# Patient Record
Sex: FEMALE | Race: WHITE | Employment: OTHER | URBAN - METROPOLITAN AREA
[De-identification: names, ages, dates, MRNs, and addresses within clinical notes are randomized per-mention and may not be internally consistent; named-entity substitution may affect disease eponyms.]

---

## 2017-03-14 ENCOUNTER — HOSPITAL ENCOUNTER (OUTPATIENT)
Age: 72
Discharge: HOME | End: 2017-03-14
Payer: MEDICARE

## 2017-03-14 LAB
ANION GAP SERPL CALCULATED.3IONS-SCNC: 13 MMOL/L (ref 9–17)
BUN BLDV-MCNC: 25 MG/DL (ref 8–23)
CALCIUM IONIZED: 1.27 MMOL/L (ref 1.13–1.33)
CHLORIDE BLD-SCNC: 105 MMOL/L (ref 98–107)
CO2: 27 MMOL/L (ref 20–31)
CREAT SERPL-MCNC: 1.04 MG/DL (ref 0.5–0.9)
GFR AFRICAN AMERICAN: >60 ML/MIN
GFR NON-AFRICAN AMERICAN: 53 ML/MIN
GFR SERPL CREATININE-BSD FRML MDRD: ABNORMAL ML/MIN/{1.73_M2}
GFR SERPL CREATININE-BSD FRML MDRD: ABNORMAL ML/MIN/{1.73_M2}
MAGNESIUM: 1.9 MG/DL (ref 1.6–2.6)
POTASSIUM SERPL-SCNC: 4.4 MMOL/L (ref 3.7–5.3)
PTH INTACT: 84.19 PG/ML (ref 15–65)
SODIUM BLD-SCNC: 145 MMOL/L (ref 135–144)
TSH SERPL DL<=0.05 MIU/L-ACNC: 2.02 MIU/L (ref 0.3–5)

## 2017-03-14 PROCEDURE — 80051 ELECTROLYTE PANEL: CPT

## 2017-03-14 PROCEDURE — 84443 ASSAY THYROID STIM HORMONE: CPT

## 2017-03-14 PROCEDURE — 82330 ASSAY OF CALCIUM: CPT

## 2017-03-14 PROCEDURE — 84520 ASSAY OF UREA NITROGEN: CPT

## 2017-03-14 PROCEDURE — 36415 COLL VENOUS BLD VENIPUNCTURE: CPT

## 2017-03-14 PROCEDURE — 82565 ASSAY OF CREATININE: CPT

## 2017-03-14 PROCEDURE — 83735 ASSAY OF MAGNESIUM: CPT

## 2017-03-14 PROCEDURE — 83970 ASSAY OF PARATHORMONE: CPT

## 2017-03-21 ENCOUNTER — HOSPITAL ENCOUNTER (OUTPATIENT)
Age: 72
Discharge: HOME | End: 2017-03-21
Payer: MEDICARE

## 2017-03-21 LAB
CALCIUM IONIZED: 1.27 MMOL/L (ref 1.13–1.33)
PHOSPHORUS: 3.3 MG/DL (ref 2.6–4.5)
PTH INTACT: 75.76 PG/ML (ref 15–65)

## 2017-03-21 PROCEDURE — 83970 ASSAY OF PARATHORMONE: CPT

## 2017-03-21 PROCEDURE — 84100 ASSAY OF PHOSPHORUS: CPT

## 2017-03-21 PROCEDURE — 36415 COLL VENOUS BLD VENIPUNCTURE: CPT

## 2017-03-21 PROCEDURE — 82330 ASSAY OF CALCIUM: CPT

## 2017-04-05 ENCOUNTER — HOSPITAL ENCOUNTER (OUTPATIENT)
Dept: CT IMAGING | Age: 72
Discharge: HOME | End: 2017-04-05
Payer: MEDICARE

## 2017-04-05 ENCOUNTER — HOSPITAL ENCOUNTER (OUTPATIENT)
Dept: NUCLEAR MEDICINE | Age: 72
Discharge: HOME | End: 2017-04-05
Payer: MEDICARE

## 2017-04-05 VITALS — WEIGHT: 280 LBS | HEIGHT: 69 IN | BODY MASS INDEX: 41.47 KG/M2

## 2017-04-05 DIAGNOSIS — R01.1 HEART MURMUR: ICD-10-CM

## 2017-04-05 DIAGNOSIS — E34.9 INCREASED PTH LEVEL: ICD-10-CM

## 2017-04-05 PROCEDURE — 70491 CT SOFT TISSUE NECK W/DYE: CPT

## 2017-04-05 PROCEDURE — 6360000004 HC RX CONTRAST MEDICATION: Performed by: FAMILY MEDICINE

## 2017-04-05 PROCEDURE — 3430000000 HC RX DIAGNOSTIC RADIOPHARMACEUTICAL: Performed by: FAMILY MEDICINE

## 2017-04-05 PROCEDURE — A9500 TC99M SESTAMIBI: HCPCS | Performed by: FAMILY MEDICINE

## 2017-04-05 PROCEDURE — 2580000003 HC RX 258: Performed by: FAMILY MEDICINE

## 2017-04-05 PROCEDURE — 78070 PARATHYROID PLANAR IMAGING: CPT

## 2017-04-05 RX ORDER — 0.9 % SODIUM CHLORIDE 0.9 %
100 INTRAVENOUS SOLUTION INTRAVENOUS ONCE
Status: COMPLETED | OUTPATIENT
Start: 2017-04-05 | End: 2017-04-05

## 2017-04-05 RX ORDER — SODIUM CHLORIDE 0.9 % (FLUSH) 0.9 %
10 SYRINGE (ML) INJECTION PRN
Status: DISCONTINUED | OUTPATIENT
Start: 2017-04-05 | End: 2017-04-08 | Stop reason: HOSPADM

## 2017-04-05 RX ADMIN — SODIUM CHLORIDE 100 ML: 9 INJECTION, SOLUTION INTRAVENOUS at 08:21

## 2017-04-05 RX ADMIN — IOVERSOL 70 ML: 741 INJECTION INTRA-ARTERIAL; INTRAVENOUS at 08:20

## 2017-04-05 RX ADMIN — Medication 10 ML: at 08:21

## 2017-04-05 RX ADMIN — TETRAKIS(2-METHOXYISOBUTYLISOCYANIDE)COPPER(I) TETRAFLUOROBORATE 21.8 MILLICURIE: 1 INJECTION, POWDER, LYOPHILIZED, FOR SOLUTION INTRAVENOUS at 07:50

## 2017-04-18 ENCOUNTER — HOSPITAL ENCOUNTER (OUTPATIENT)
Dept: CT IMAGING | Age: 72
End: 2017-04-18
Payer: MEDICARE

## 2017-04-18 ENCOUNTER — HOSPITAL ENCOUNTER (OUTPATIENT)
Dept: MRI IMAGING | Age: 72
Discharge: HOME | End: 2017-04-18
Payer: MEDICARE

## 2017-04-18 ENCOUNTER — HOSPITAL ENCOUNTER (OUTPATIENT)
Dept: CT IMAGING | Age: 72
Discharge: HOME | End: 2017-04-18
Payer: MEDICARE

## 2017-04-18 DIAGNOSIS — I72.9 ANEURYSM (HCC): ICD-10-CM

## 2017-04-18 LAB
BUN BLDV-MCNC: 37 MG/DL (ref 8–23)
CREAT SERPL-MCNC: 1.3 MG/DL (ref 0.5–0.9)
GFR AFRICAN AMERICAN: 49 ML/MIN
GFR NON-AFRICAN AMERICAN: 41 ML/MIN
GFR SERPL CREATININE-BSD FRML MDRD: ABNORMAL ML/MIN/{1.73_M2}
GFR SERPL CREATININE-BSD FRML MDRD: ABNORMAL ML/MIN/{1.73_M2}

## 2017-04-18 PROCEDURE — 84520 ASSAY OF UREA NITROGEN: CPT

## 2017-04-18 PROCEDURE — 36415 COLL VENOUS BLD VENIPUNCTURE: CPT

## 2017-04-18 PROCEDURE — 70544 MR ANGIOGRAPHY HEAD W/O DYE: CPT

## 2017-04-18 PROCEDURE — 82565 ASSAY OF CREATININE: CPT

## 2017-05-17 ENCOUNTER — OFFICE VISIT (OUTPATIENT)
Dept: NEUROSURGERY | Age: 72
End: 2017-05-17
Payer: MEDICARE

## 2017-05-17 VITALS
BODY MASS INDEX: 44.65 KG/M2 | SYSTOLIC BLOOD PRESSURE: 164 MMHG | WEIGHT: 293 LBS | HEART RATE: 84 BPM | DIASTOLIC BLOOD PRESSURE: 80 MMHG

## 2017-05-17 DIAGNOSIS — I67.1 CEREBRAL ANEURYSM: ICD-10-CM

## 2017-05-17 DIAGNOSIS — M54.40 BACK PAIN OF LUMBOSACARAL REGION WITH SCIATICA: ICD-10-CM

## 2017-05-17 DIAGNOSIS — M48.062 LUMBAR STENOSIS WITH NEUROGENIC CLAUDICATION: Primary | ICD-10-CM

## 2017-05-17 PROCEDURE — 4040F PNEUMOC VAC/ADMIN/RCVD: CPT | Performed by: NEUROLOGICAL SURGERY

## 2017-05-17 PROCEDURE — G8417 CALC BMI ABV UP PARAM F/U: HCPCS | Performed by: NEUROLOGICAL SURGERY

## 2017-05-17 PROCEDURE — 3017F COLORECTAL CA SCREEN DOC REV: CPT | Performed by: NEUROLOGICAL SURGERY

## 2017-05-17 PROCEDURE — 99203 OFFICE O/P NEW LOW 30 MIN: CPT | Performed by: NEUROLOGICAL SURGERY

## 2017-05-17 PROCEDURE — 1036F TOBACCO NON-USER: CPT | Performed by: NEUROLOGICAL SURGERY

## 2017-05-17 PROCEDURE — 3014F SCREEN MAMMO DOC REV: CPT | Performed by: NEUROLOGICAL SURGERY

## 2017-05-17 PROCEDURE — 1090F PRES/ABSN URINE INCON ASSESS: CPT | Performed by: NEUROLOGICAL SURGERY

## 2017-05-17 PROCEDURE — G8427 DOCREV CUR MEDS BY ELIG CLIN: HCPCS | Performed by: NEUROLOGICAL SURGERY

## 2017-05-17 ASSESSMENT — ENCOUNTER SYMPTOMS
ABDOMINAL PAIN: 0
HEMOPTYSIS: 0
BLURRED VISION: 0
COUGH: 0
BACK PAIN: 1
SHORTNESS OF BREATH: 0
CONSTIPATION: 0
PHOTOPHOBIA: 0
EYE PAIN: 0
HEARTBURN: 0
BLOOD IN STOOL: 0

## 2017-05-22 ENCOUNTER — OFFICE VISIT (OUTPATIENT)
Dept: NEUROLOGY | Age: 72
End: 2017-05-22
Payer: MEDICARE

## 2017-05-22 VITALS
WEIGHT: 293 LBS | HEART RATE: 93 BPM | BODY MASS INDEX: 44.8 KG/M2 | DIASTOLIC BLOOD PRESSURE: 69 MMHG | SYSTOLIC BLOOD PRESSURE: 169 MMHG

## 2017-05-22 DIAGNOSIS — I67.1 CEREBRAL ANEURYSM: Primary | ICD-10-CM

## 2017-05-22 PROCEDURE — 1123F ACP DISCUSS/DSCN MKR DOCD: CPT | Performed by: PSYCHIATRY & NEUROLOGY

## 2017-05-22 PROCEDURE — 1036F TOBACCO NON-USER: CPT | Performed by: PSYCHIATRY & NEUROLOGY

## 2017-05-22 PROCEDURE — 99205 OFFICE O/P NEW HI 60 MIN: CPT | Performed by: PSYCHIATRY & NEUROLOGY

## 2017-05-22 PROCEDURE — G8400 PT W/DXA NO RESULTS DOC: HCPCS | Performed by: PSYCHIATRY & NEUROLOGY

## 2017-05-22 PROCEDURE — G8417 CALC BMI ABV UP PARAM F/U: HCPCS | Performed by: PSYCHIATRY & NEUROLOGY

## 2017-05-22 PROCEDURE — 3017F COLORECTAL CA SCREEN DOC REV: CPT | Performed by: PSYCHIATRY & NEUROLOGY

## 2017-05-22 PROCEDURE — G8427 DOCREV CUR MEDS BY ELIG CLIN: HCPCS | Performed by: PSYCHIATRY & NEUROLOGY

## 2017-05-22 PROCEDURE — 1090F PRES/ABSN URINE INCON ASSESS: CPT | Performed by: PSYCHIATRY & NEUROLOGY

## 2017-05-22 PROCEDURE — 4040F PNEUMOC VAC/ADMIN/RCVD: CPT | Performed by: PSYCHIATRY & NEUROLOGY

## 2017-05-22 PROCEDURE — 3014F SCREEN MAMMO DOC REV: CPT | Performed by: PSYCHIATRY & NEUROLOGY

## 2017-05-22 RX ORDER — CLOPIDOGREL BISULFATE 75 MG/1
75 TABLET ORAL DAILY
Qty: 30 TABLET | Refills: 5 | Status: SHIPPED | OUTPATIENT
Start: 2017-05-22 | End: 2017-05-31 | Stop reason: SDUPTHER

## 2017-05-31 ENCOUNTER — HOSPITAL ENCOUNTER (OUTPATIENT)
Dept: PREADMISSION TESTING | Age: 72
Discharge: HOME | End: 2017-05-31
Payer: MEDICARE

## 2017-05-31 VITALS
SYSTOLIC BLOOD PRESSURE: 130 MMHG | HEART RATE: 88 BPM | TEMPERATURE: 97.7 F | OXYGEN SATURATION: 98 % | BODY MASS INDEX: 42.65 KG/M2 | WEIGHT: 288 LBS | DIASTOLIC BLOOD PRESSURE: 69 MMHG | HEIGHT: 69 IN | RESPIRATION RATE: 16 BRPM

## 2017-05-31 LAB
ANION GAP SERPL CALCULATED.3IONS-SCNC: 16 MMOL/L (ref 9–17)
BUN BLDV-MCNC: 40 MG/DL (ref 8–23)
BUN/CREAT BLD: ABNORMAL (ref 9–20)
CALCIUM SERPL-MCNC: 9.5 MG/DL (ref 8.6–10.4)
CHLORIDE BLD-SCNC: 99 MMOL/L (ref 98–107)
CO2: 25 MMOL/L (ref 20–31)
COLLAGEN ADENOSINE-5'-DIPHOSPHATE (ADP) TIME: 63 SEC (ref 67–112)
COLLAGEN EPINEPHRINE TIME: >300 SEC (ref 85–172)
CREAT SERPL-MCNC: 1.34 MG/DL (ref 0.5–0.9)
GFR AFRICAN AMERICAN: 48 ML/MIN
GFR NON-AFRICAN AMERICAN: 39 ML/MIN
GFR SERPL CREATININE-BSD FRML MDRD: ABNORMAL ML/MIN/{1.73_M2}
GFR SERPL CREATININE-BSD FRML MDRD: ABNORMAL ML/MIN/{1.73_M2}
GLUCOSE BLD-MCNC: 120 MG/DL (ref 70–99)
HCT VFR BLD CALC: 35.4 % (ref 36–46)
HEMOGLOBIN: 11.6 G/DL (ref 12–16)
INR BLD: 1
MCH RBC QN AUTO: 31.5 PG (ref 26–34)
MCHC RBC AUTO-ENTMCNC: 32.9 G/DL (ref 31–37)
MCV RBC AUTO: 95.8 FL (ref 80–100)
PARTIAL THROMBOPLASTIN TIME: 24.4 SEC (ref 21.3–31.3)
PDW BLD-RTO: 15.3 % (ref 12.5–15.4)
PLATELET # BLD: 182 K/UL (ref 140–450)
PLATELET FUNCTION INTERP: ABNORMAL
PMV BLD AUTO: 8 FL (ref 6–12)
POTASSIUM SERPL-SCNC: 4.1 MMOL/L (ref 3.7–5.3)
PROTHROMBIN TIME: 10.7 SEC (ref 9.4–12.6)
RBC # BLD: 3.69 M/UL (ref 4–5.2)
SODIUM BLD-SCNC: 140 MMOL/L (ref 135–144)
WBC # BLD: 8.9 K/UL (ref 3.5–11)

## 2017-05-31 PROCEDURE — 85730 THROMBOPLASTIN TIME PARTIAL: CPT

## 2017-05-31 PROCEDURE — 85576 BLOOD PLATELET AGGREGATION: CPT

## 2017-05-31 PROCEDURE — 86901 BLOOD TYPING SEROLOGIC RH(D): CPT

## 2017-05-31 PROCEDURE — 80048 BASIC METABOLIC PNL TOTAL CA: CPT

## 2017-05-31 PROCEDURE — 85610 PROTHROMBIN TIME: CPT

## 2017-05-31 PROCEDURE — 86920 COMPATIBILITY TEST SPIN: CPT

## 2017-05-31 PROCEDURE — 86850 RBC ANTIBODY SCREEN: CPT

## 2017-05-31 PROCEDURE — 85027 COMPLETE CBC AUTOMATED: CPT

## 2017-05-31 PROCEDURE — 86900 BLOOD TYPING SEROLOGIC ABO: CPT

## 2017-05-31 PROCEDURE — 36415 COLL VENOUS BLD VENIPUNCTURE: CPT

## 2017-05-31 PROCEDURE — 93005 ELECTROCARDIOGRAM TRACING: CPT

## 2017-05-31 RX ORDER — ALLOPURINOL 300 MG/1
300 TABLET ORAL DAILY
COMMUNITY

## 2017-05-31 RX ORDER — SODIUM CHLORIDE, SODIUM LACTATE, POTASSIUM CHLORIDE, CALCIUM CHLORIDE 600; 310; 30; 20 MG/100ML; MG/100ML; MG/100ML; MG/100ML
1000 INJECTION, SOLUTION INTRAVENOUS CONTINUOUS
Status: CANCELLED | OUTPATIENT
Start: 2017-05-31

## 2017-05-31 RX ORDER — LISINOPRIL AND HYDROCHLOROTHIAZIDE 20; 12.5 MG/1; MG/1
1 TABLET ORAL DAILY
COMMUNITY

## 2017-05-31 RX ORDER — ASPIRIN 325 MG
325 TABLET ORAL DAILY
COMMUNITY

## 2017-05-31 RX ORDER — LISINOPRIL AND HYDROCHLOROTHIAZIDE 20; 12.5 MG/1; MG/1
2 TABLET ORAL DAILY
COMMUNITY
End: 2017-05-31 | Stop reason: SDUPTHER

## 2017-05-31 RX ORDER — PRAVASTATIN SODIUM 40 MG
40 TABLET ORAL NIGHTLY
COMMUNITY

## 2017-05-31 RX ORDER — CLOPIDOGREL BISULFATE 75 MG/1
75 TABLET ORAL DAILY
COMMUNITY
End: 2018-01-19 | Stop reason: SDUPTHER

## 2017-06-01 LAB
EKG ATRIAL RATE: 85 BPM
EKG P AXIS: 86 DEGREES
EKG P-R INTERVAL: 144 MS
EKG Q-T INTERVAL: 376 MS
EKG QRS DURATION: 84 MS
EKG QTC CALCULATION (BAZETT): 447 MS
EKG R AXIS: 59 DEGREES
EKG T AXIS: 47 DEGREES
EKG VENTRICULAR RATE: 85 BPM

## 2017-06-06 ENCOUNTER — ANESTHESIA EVENT (OUTPATIENT)
Dept: INTERVENTIONAL RADIOLOGY/VASCULAR | Age: 72
End: 2017-06-06

## 2017-06-07 ENCOUNTER — HOSPITAL ENCOUNTER (INPATIENT)
Dept: INTERVENTIONAL RADIOLOGY/VASCULAR | Age: 72
LOS: 2 days | Discharge: HOME | DRG: 026 | End: 2017-06-09
Attending: PSYCHIATRY & NEUROLOGY | Admitting: PSYCHIATRY & NEUROLOGY
Payer: MEDICARE

## 2017-06-07 ENCOUNTER — ANESTHESIA (OUTPATIENT)
Dept: INTERVENTIONAL RADIOLOGY/VASCULAR | Age: 72
End: 2017-06-07

## 2017-06-07 VITALS — TEMPERATURE: 95.8 F | SYSTOLIC BLOOD PRESSURE: 98 MMHG | OXYGEN SATURATION: 100 % | DIASTOLIC BLOOD PRESSURE: 43 MMHG

## 2017-06-07 DIAGNOSIS — I72.9 ANEURYSM (HCC): ICD-10-CM

## 2017-06-07 LAB
ACTIVATED CLOTTING TIME: 143 SEC (ref 79–149)
ACTIVATED CLOTTING TIME: 223 SEC (ref 79–149)
ACTIVATED CLOTTING TIME: 234 SEC (ref 79–149)

## 2017-06-07 PROCEDURE — 6360000002 HC RX W HCPCS: Performed by: NURSE ANESTHETIST, CERTIFIED REGISTERED

## 2017-06-07 PROCEDURE — 7100000000 HC PACU RECOVERY - FIRST 15 MIN

## 2017-06-07 PROCEDURE — 36224 PLACE CATH CAROTD ART: CPT | Performed by: PSYCHIATRY & NEUROLOGY

## 2017-06-07 PROCEDURE — 6360000004 HC RX CONTRAST MEDICATION: Performed by: PSYCHIATRY & NEUROLOGY

## 2017-06-07 PROCEDURE — 85347 COAGULATION TIME ACTIVATED: CPT

## 2017-06-07 PROCEDURE — 87641 MR-STAPH DNA AMP PROBE: CPT

## 2017-06-07 PROCEDURE — 2580000003 HC RX 258: Performed by: NURSE ANESTHETIST, CERTIFIED REGISTERED

## 2017-06-07 PROCEDURE — 3700000000 HC ANESTHESIA ATTENDED CARE

## 2017-06-07 PROCEDURE — 61624 TCAT PERM OCCLS/EMBOLJ CNS: CPT | Performed by: PSYCHIATRY & NEUROLOGY

## 2017-06-07 PROCEDURE — 2580000003 HC RX 258: Performed by: ANESTHESIOLOGY

## 2017-06-07 PROCEDURE — 7100000001 HC PACU RECOVERY - ADDTL 15 MIN

## 2017-06-07 PROCEDURE — 3700000001 HC ADD 15 MINUTES (ANESTHESIA)

## 2017-06-07 PROCEDURE — 2500000003 HC RX 250 WO HCPCS

## 2017-06-07 PROCEDURE — 2580000003 HC RX 258: Performed by: PSYCHIATRY & NEUROLOGY

## 2017-06-07 PROCEDURE — 75894 X-RAYS TRANSCATH THERAPY: CPT | Performed by: PSYCHIATRY & NEUROLOGY

## 2017-06-07 PROCEDURE — 2500000003 HC RX 250 WO HCPCS: Performed by: NURSE ANESTHETIST, CERTIFIED REGISTERED

## 2017-06-07 PROCEDURE — 61626 TCAT PERM OCCLS/EMBOL NONCNS: CPT | Performed by: PSYCHIATRY & NEUROLOGY

## 2017-06-07 PROCEDURE — 2000000003 HC NEURO ICU R&B

## 2017-06-07 PROCEDURE — 87086 URINE CULTURE/COLONY COUNT: CPT

## 2017-06-07 PROCEDURE — 03VG3DZ RESTRICTION OF INTRACRANIAL ARTERY WITH INTRALUMINAL DEVICE, PERCUTANEOUS APPROACH: ICD-10-PCS | Performed by: PSYCHIATRY & NEUROLOGY

## 2017-06-07 PROCEDURE — 75898 FOLLOW-UP ANGIOGRAPHY: CPT | Performed by: PSYCHIATRY & NEUROLOGY

## 2017-06-07 PROCEDURE — 36140 INTRO NDL ICATH UPR/LXTR ART: CPT | Performed by: PSYCHIATRY & NEUROLOGY

## 2017-06-07 PROCEDURE — 6360000002 HC RX W HCPCS: Performed by: EMERGENCY MEDICINE

## 2017-06-07 PROCEDURE — 6360000002 HC RX W HCPCS

## 2017-06-07 PROCEDURE — 2780000010 IR ANGIOGRAM CAROTID CEREBRAL BILATERAL

## 2017-06-07 RX ORDER — SODIUM CHLORIDE 9 MG/ML
INJECTION, SOLUTION INTRAVENOUS CONTINUOUS PRN
Status: DISCONTINUED | OUTPATIENT
Start: 2017-06-07 | End: 2017-06-07 | Stop reason: SDUPTHER

## 2017-06-07 RX ORDER — SODIUM CHLORIDE 9 MG/ML
INJECTION, SOLUTION INTRAVENOUS CONTINUOUS
Status: DISCONTINUED | OUTPATIENT
Start: 2017-06-07 | End: 2017-06-09 | Stop reason: HOSPADM

## 2017-06-07 RX ORDER — CLINDAMYCIN PHOSPHATE 150 MG/ML
INJECTION, SOLUTION INTRAVENOUS PRN
Status: DISCONTINUED | OUTPATIENT
Start: 2017-06-07 | End: 2017-06-07 | Stop reason: SDUPTHER

## 2017-06-07 RX ORDER — CLOPIDOGREL BISULFATE 75 MG/1
75 TABLET ORAL DAILY
Status: DISCONTINUED | OUTPATIENT
Start: 2017-06-08 | End: 2017-06-09 | Stop reason: HOSPADM

## 2017-06-07 RX ORDER — HEPARIN SODIUM 1000 [USP'U]/ML
INJECTION, SOLUTION INTRAVENOUS; SUBCUTANEOUS PRN
Status: DISCONTINUED | OUTPATIENT
Start: 2017-06-07 | End: 2017-06-07 | Stop reason: SDUPTHER

## 2017-06-07 RX ORDER — 0.9 % SODIUM CHLORIDE 0.9 %
1000 INTRAVENOUS SOLUTION INTRAVENOUS ONCE
Status: COMPLETED | OUTPATIENT
Start: 2017-06-07 | End: 2017-06-08

## 2017-06-07 RX ORDER — ROCURONIUM BROMIDE 10 MG/ML
INJECTION, SOLUTION INTRAVENOUS PRN
Status: DISCONTINUED | OUTPATIENT
Start: 2017-06-07 | End: 2017-06-07 | Stop reason: SDUPTHER

## 2017-06-07 RX ORDER — PROTAMINE SULFATE 10 MG/ML
INJECTION, SOLUTION INTRAVENOUS PRN
Status: DISCONTINUED | OUTPATIENT
Start: 2017-06-07 | End: 2017-06-07 | Stop reason: SDUPTHER

## 2017-06-07 RX ORDER — PROPOFOL 10 MG/ML
INJECTION, EMULSION INTRAVENOUS PRN
Status: DISCONTINUED | OUTPATIENT
Start: 2017-06-07 | End: 2017-06-07 | Stop reason: SDUPTHER

## 2017-06-07 RX ORDER — FENTANYL CITRATE 50 UG/ML
INJECTION, SOLUTION INTRAMUSCULAR; INTRAVENOUS PRN
Status: DISCONTINUED | OUTPATIENT
Start: 2017-06-07 | End: 2017-06-07 | Stop reason: SDUPTHER

## 2017-06-07 RX ORDER — SODIUM CHLORIDE, SODIUM LACTATE, POTASSIUM CHLORIDE, CALCIUM CHLORIDE 600; 310; 30; 20 MG/100ML; MG/100ML; MG/100ML; MG/100ML
1000 INJECTION, SOLUTION INTRAVENOUS CONTINUOUS
Status: DISCONTINUED | OUTPATIENT
Start: 2017-06-07 | End: 2017-06-09 | Stop reason: HOSPADM

## 2017-06-07 RX ORDER — ASPIRIN 81 MG/1
81 TABLET ORAL DAILY
Status: DISCONTINUED | OUTPATIENT
Start: 2017-06-08 | End: 2017-06-09 | Stop reason: HOSPADM

## 2017-06-07 RX ORDER — IODIXANOL 320 MG/ML
200 INJECTION, SOLUTION INTRAVASCULAR
Status: COMPLETED | OUTPATIENT
Start: 2017-06-07 | End: 2017-06-07

## 2017-06-07 RX ORDER — ACETAMINOPHEN 325 MG/1
650 TABLET ORAL EVERY 4 HOURS PRN
Status: CANCELLED | OUTPATIENT
Start: 2017-06-07

## 2017-06-07 RX ORDER — MORPHINE SULFATE 4 MG/ML
4 INJECTION, SOLUTION INTRAMUSCULAR; INTRAVENOUS
Status: DISCONTINUED | OUTPATIENT
Start: 2017-06-07 | End: 2017-06-09 | Stop reason: HOSPADM

## 2017-06-07 RX ORDER — GLYCOPYRROLATE 0.2 MG/ML
INJECTION INTRAMUSCULAR; INTRAVENOUS PRN
Status: DISCONTINUED | OUTPATIENT
Start: 2017-06-07 | End: 2017-06-07 | Stop reason: SDUPTHER

## 2017-06-07 RX ORDER — MORPHINE SULFATE 2 MG/ML
2 INJECTION, SOLUTION INTRAMUSCULAR; INTRAVENOUS
Status: DISCONTINUED | OUTPATIENT
Start: 2017-06-07 | End: 2017-06-09 | Stop reason: HOSPADM

## 2017-06-07 RX ADMIN — SODIUM CHLORIDE: 9 INJECTION, SOLUTION INTRAVENOUS at 16:46

## 2017-06-07 RX ADMIN — Medication 5 MG: at 19:40

## 2017-06-07 RX ADMIN — HEPARIN SODIUM 7000 UNITS: 1000 INJECTION INTRAVENOUS; SUBCUTANEOUS at 17:11

## 2017-06-07 RX ADMIN — PROTAMINE SULFATE 10 MG: 10 INJECTION, SOLUTION INTRAVENOUS at 19:36

## 2017-06-07 RX ADMIN — PROTAMINE SULFATE 10 MG: 10 INJECTION, SOLUTION INTRAVENOUS at 19:34

## 2017-06-07 RX ADMIN — PROTAMINE SULFATE 10 MG: 10 INJECTION, SOLUTION INTRAVENOUS at 19:35

## 2017-06-07 RX ADMIN — PROTAMINE SULFATE 10 MG: 10 INJECTION, SOLUTION INTRAVENOUS at 19:29

## 2017-06-07 RX ADMIN — PROTAMINE SULFATE 10 MG: 10 INJECTION, SOLUTION INTRAVENOUS at 19:31

## 2017-06-07 RX ADMIN — FENTANYL CITRATE 100 MCG: 50 INJECTION INTRAMUSCULAR; INTRAVENOUS at 16:34

## 2017-06-07 RX ADMIN — GLYCOPYRROLATE 0.8 MG: 0.2 INJECTION, SOLUTION INTRAMUSCULAR; INTRAVENOUS at 19:40

## 2017-06-07 RX ADMIN — PROPOFOL 200 MG: 10 INJECTION, EMULSION INTRAVENOUS at 16:34

## 2017-06-07 RX ADMIN — SODIUM CHLORIDE, POTASSIUM CHLORIDE, SODIUM LACTATE AND CALCIUM CHLORIDE 1000 ML: 600; 310; 30; 20 INJECTION, SOLUTION INTRAVENOUS at 12:15

## 2017-06-07 RX ADMIN — SODIUM CHLORIDE: 9 INJECTION, SOLUTION INTRAVENOUS at 19:09

## 2017-06-07 RX ADMIN — CLINDAMYCIN PHOSPHATE 600 MG: 150 INJECTION, SOLUTION INTRAMUSCULAR; INTRAVENOUS at 17:07

## 2017-06-07 RX ADMIN — IODIXANOL 135 ML: 320 INJECTION, SOLUTION INTRAVASCULAR at 20:09

## 2017-06-07 RX ADMIN — MORPHINE SULFATE 4 MG: 4 INJECTION, SOLUTION INTRAMUSCULAR; INTRAVENOUS at 22:34

## 2017-06-07 RX ADMIN — HEPARIN SODIUM 1000 UNITS: 1000 INJECTION INTRAVENOUS; SUBCUTANEOUS at 18:22

## 2017-06-07 RX ADMIN — ROCURONIUM BROMIDE 50 MG: 10 INJECTION INTRAVENOUS at 16:35

## 2017-06-07 RX ADMIN — PROTAMINE SULFATE 10 MG: 10 INJECTION, SOLUTION INTRAVENOUS at 19:30

## 2017-06-07 RX ADMIN — ROCURONIUM BROMIDE 10 MG: 10 INJECTION INTRAVENOUS at 18:52

## 2017-06-07 RX ADMIN — PROTAMINE SULFATE 10 MG: 10 INJECTION, SOLUTION INTRAVENOUS at 19:32

## 2017-06-07 RX ADMIN — PROTAMINE SULFATE 10 MG: 10 INJECTION, SOLUTION INTRAVENOUS at 19:33

## 2017-06-07 RX ADMIN — SODIUM CHLORIDE: 9 INJECTION, SOLUTION INTRAVENOUS at 22:34

## 2017-06-07 RX ADMIN — ROCURONIUM BROMIDE 20 MG: 10 INJECTION INTRAVENOUS at 17:55

## 2017-06-07 RX ADMIN — PHENYLEPHRINE HYDROCHLORIDE 100 MCG: 10 INJECTION INTRAMUSCULAR; INTRAVENOUS; SUBCUTANEOUS at 17:31

## 2017-06-07 ASSESSMENT — PAIN DESCRIPTION - PAIN TYPE: TYPE: CHRONIC PAIN

## 2017-06-07 ASSESSMENT — PAIN DESCRIPTION - FREQUENCY: FREQUENCY: CONTINUOUS

## 2017-06-07 ASSESSMENT — PAIN DESCRIPTION - DESCRIPTORS: DESCRIPTORS: ACHING;SORE

## 2017-06-07 ASSESSMENT — ENCOUNTER SYMPTOMS
STRIDOR: 0
SHORTNESS OF BREATH: 0

## 2017-06-07 ASSESSMENT — PAIN SCALES - GENERAL
PAINLEVEL_OUTOF10: 7
PAINLEVEL_OUTOF10: 0

## 2017-06-07 ASSESSMENT — PAIN DESCRIPTION - ONSET: ONSET: ON-GOING

## 2017-06-07 ASSESSMENT — PAIN DESCRIPTION - ORIENTATION: ORIENTATION: MID;LOWER

## 2017-06-07 ASSESSMENT — PAIN - FUNCTIONAL ASSESSMENT: PAIN_FUNCTIONAL_ASSESSMENT: 0-10

## 2017-06-07 ASSESSMENT — PAIN DESCRIPTION - LOCATION: LOCATION: BACK

## 2017-06-07 ASSESSMENT — PAIN DESCRIPTION - PROGRESSION: CLINICAL_PROGRESSION: NOT CHANGED

## 2017-06-08 LAB
ABSOLUTE EOS #: 0.1 K/UL (ref 0–0.4)
ABSOLUTE LYMPH #: 0.6 K/UL (ref 1–4.8)
ABSOLUTE MONO #: 0.4 K/UL (ref 0.1–1.2)
ALBUMIN SERPL-MCNC: 3 G/DL (ref 3.5–5.2)
ALBUMIN/GLOBULIN RATIO: 1.1 (ref 1–2.5)
ALP BLD-CCNC: 64 U/L (ref 35–104)
ALT SERPL-CCNC: 9 U/L (ref 5–33)
ANION GAP SERPL CALCULATED.3IONS-SCNC: 12 MMOL/L (ref 9–17)
AST SERPL-CCNC: 12 U/L
BASOPHILS # BLD: 1 %
BASOPHILS ABSOLUTE: 0 K/UL (ref 0–0.2)
BILIRUB SERPL-MCNC: 0.6 MG/DL (ref 0.3–1.2)
BUN BLDV-MCNC: 18 MG/DL (ref 8–23)
BUN/CREAT BLD: ABNORMAL (ref 9–20)
CALCIUM SERPL-MCNC: 7.9 MG/DL (ref 8.6–10.4)
CHLORIDE BLD-SCNC: 106 MMOL/L (ref 98–107)
CO2: 22 MMOL/L (ref 20–31)
CREAT SERPL-MCNC: 0.92 MG/DL (ref 0.5–0.9)
CULTURE: NO GROWTH
CULTURE: NORMAL
DIFFERENTIAL TYPE: ABNORMAL
EOSINOPHILS RELATIVE PERCENT: 1 %
GFR AFRICAN AMERICAN: >60 ML/MIN
GFR NON-AFRICAN AMERICAN: >60 ML/MIN
GFR SERPL CREATININE-BSD FRML MDRD: ABNORMAL ML/MIN/{1.73_M2}
GFR SERPL CREATININE-BSD FRML MDRD: ABNORMAL ML/MIN/{1.73_M2}
GLUCOSE BLD-MCNC: 106 MG/DL (ref 70–99)
HCT VFR BLD CALC: 27.6 % (ref 36–46)
HEMOGLOBIN: 9 G/DL (ref 12–16)
LYMPHOCYTES # BLD: 10 %
Lab: NORMAL
MCH RBC QN AUTO: 31.7 PG (ref 26–34)
MCHC RBC AUTO-ENTMCNC: 32.6 G/DL (ref 31–37)
MCV RBC AUTO: 97.3 FL (ref 80–100)
MONOCYTES # BLD: 6 %
MRSA, DNA, NASAL: NORMAL
PDW BLD-RTO: 15 % (ref 12.5–15.4)
PLATELET # BLD: 130 K/UL (ref 140–450)
PLATELET ESTIMATE: ABNORMAL
PMV BLD AUTO: 9 FL (ref 6–12)
POTASSIUM SERPL-SCNC: 4.1 MMOL/L (ref 3.7–5.3)
RBC # BLD: 2.84 M/UL (ref 4–5.2)
RBC # BLD: ABNORMAL 10*6/UL
SEG NEUTROPHILS: 82 %
SEGMENTED NEUTROPHILS ABSOLUTE COUNT: 5 K/UL (ref 1.8–7.7)
SODIUM BLD-SCNC: 140 MMOL/L (ref 135–144)
SPECIMEN DESCRIPTION: NORMAL
SPECIMEN DESCRIPTION: NORMAL
STATUS: NORMAL
TOTAL PROTEIN: 5.7 G/DL (ref 6.4–8.3)
WBC # BLD: 6.1 K/UL (ref 3.5–11)
WBC # BLD: ABNORMAL 10*3/UL

## 2017-06-08 PROCEDURE — 80053 COMPREHEN METABOLIC PANEL: CPT

## 2017-06-08 PROCEDURE — 2000000003 HC NEURO ICU R&B

## 2017-06-08 PROCEDURE — 6370000000 HC RX 637 (ALT 250 FOR IP): Performed by: PSYCHIATRY & NEUROLOGY

## 2017-06-08 PROCEDURE — 99232 SBSQ HOSP IP/OBS MODERATE 35: CPT | Performed by: PSYCHIATRY & NEUROLOGY

## 2017-06-08 PROCEDURE — 86900 BLOOD TYPING SEROLOGIC ABO: CPT

## 2017-06-08 PROCEDURE — 2580000003 HC RX 258: Performed by: EMERGENCY MEDICINE

## 2017-06-08 PROCEDURE — 6360000002 HC RX W HCPCS

## 2017-06-08 PROCEDURE — 85025 COMPLETE CBC W/AUTO DIFF WBC: CPT

## 2017-06-08 PROCEDURE — P9016 RBC LEUKOCYTES REDUCED: HCPCS

## 2017-06-08 PROCEDURE — 36415 COLL VENOUS BLD VENIPUNCTURE: CPT

## 2017-06-08 PROCEDURE — 2580000003 HC RX 258: Performed by: PSYCHIATRY & NEUROLOGY

## 2017-06-08 PROCEDURE — 6360000002 HC RX W HCPCS: Performed by: EMERGENCY MEDICINE

## 2017-06-08 RX ORDER — 0.9 % SODIUM CHLORIDE 0.9 %
250 INTRAVENOUS SOLUTION INTRAVENOUS ONCE
Status: DISCONTINUED | OUTPATIENT
Start: 2017-06-08 | End: 2017-06-09 | Stop reason: HOSPADM

## 2017-06-08 RX ORDER — 0.9 % SODIUM CHLORIDE 0.9 %
250 INTRAVENOUS SOLUTION INTRAVENOUS ONCE
Status: COMPLETED | OUTPATIENT
Start: 2017-06-08 | End: 2017-06-08

## 2017-06-08 RX ORDER — PROMETHAZINE HYDROCHLORIDE 25 MG/ML
INJECTION, SOLUTION INTRAMUSCULAR; INTRAVENOUS
Status: COMPLETED
Start: 2017-06-08 | End: 2017-06-08

## 2017-06-08 RX ORDER — PROMETHAZINE HYDROCHLORIDE 6.25 MG/5ML
6.25 SYRUP ORAL EVERY 4 HOURS PRN
Status: DISCONTINUED | OUTPATIENT
Start: 2017-06-08 | End: 2017-06-08

## 2017-06-08 RX ORDER — PROMETHAZINE HYDROCHLORIDE 25 MG/ML
6.25 INJECTION, SOLUTION INTRAMUSCULAR; INTRAVENOUS EVERY 6 HOURS PRN
Status: DISCONTINUED | OUTPATIENT
Start: 2017-06-08 | End: 2017-06-09 | Stop reason: HOSPADM

## 2017-06-08 RX ADMIN — MORPHINE SULFATE 4 MG: 4 INJECTION, SOLUTION INTRAMUSCULAR; INTRAVENOUS at 00:41

## 2017-06-08 RX ADMIN — PROMETHAZINE HYDROCHLORIDE 6.25 MG: 25 INJECTION, SOLUTION INTRAMUSCULAR; INTRAVENOUS at 10:30

## 2017-06-08 RX ADMIN — SODIUM CHLORIDE 250 ML: 9 INJECTION, SOLUTION INTRAVENOUS at 23:57

## 2017-06-08 RX ADMIN — PROMETHAZINE HYDROCHLORIDE 6.25 MG: 25 INJECTION INTRAMUSCULAR; INTRAVENOUS at 10:30

## 2017-06-08 RX ADMIN — SODIUM CHLORIDE: 9 INJECTION, SOLUTION INTRAVENOUS at 23:58

## 2017-06-08 RX ADMIN — CLOPIDOGREL 75 MG: 75 TABLET, FILM COATED ORAL at 10:02

## 2017-06-08 RX ADMIN — SODIUM CHLORIDE 1000 ML: 9 INJECTION, SOLUTION INTRAVENOUS at 00:15

## 2017-06-08 RX ADMIN — ASPIRIN 81 MG: 81 TABLET, COATED ORAL at 10:02

## 2017-06-08 ASSESSMENT — PAIN DESCRIPTION - LOCATION: LOCATION: HEAD

## 2017-06-08 ASSESSMENT — PAIN SCALES - GENERAL
PAINLEVEL_OUTOF10: 9
PAINLEVEL_OUTOF10: 0
PAINLEVEL_OUTOF10: 1

## 2017-06-08 ASSESSMENT — PAIN DESCRIPTION - PAIN TYPE: TYPE: ACUTE PAIN

## 2017-06-09 VITALS
RESPIRATION RATE: 18 BRPM | SYSTOLIC BLOOD PRESSURE: 155 MMHG | WEIGHT: 289.9 LBS | HEART RATE: 92 BPM | BODY MASS INDEX: 42.94 KG/M2 | HEIGHT: 69 IN | OXYGEN SATURATION: 98 % | DIASTOLIC BLOOD PRESSURE: 68 MMHG | TEMPERATURE: 98.2 F

## 2017-06-09 PROBLEM — I67.1 CEREBRAL ANEURYSM WITHOUT RUPTURE: Status: ACTIVE | Noted: 2017-06-09

## 2017-06-09 PROBLEM — E66.01 MORBID OBESITY WITH BMI OF 40.0-44.9, ADULT (HCC): Status: ACTIVE | Noted: 2017-06-09

## 2017-06-09 PROBLEM — I67.1 ANEURYSM OF LEFT INTERNAL CAROTID ARTERY: Status: ACTIVE | Noted: 2017-06-09

## 2017-06-09 PROBLEM — Z95.828 MRI-SAFE ENDOVASCULAR ANEURYSM COIL PRESENT: Status: ACTIVE | Noted: 2017-06-09

## 2017-06-09 LAB
ABO/RH: NORMAL
ABSOLUTE EOS #: 0.2 K/UL (ref 0–0.4)
ABSOLUTE LYMPH #: 0.9 K/UL (ref 1–4.8)
ABSOLUTE MONO #: 0.3 K/UL (ref 0.1–1.2)
ANTIBODY SCREEN: NEGATIVE
ARM BAND NUMBER: NORMAL
BASOPHILS # BLD: 1 %
BASOPHILS ABSOLUTE: 0 K/UL (ref 0–0.2)
BLD PROD TYP BPU: NORMAL
CROSSMATCH RESULT: NORMAL
DIFFERENTIAL TYPE: ABNORMAL
DISPENSE STATUS BLOOD BANK: NORMAL
EOSINOPHILS RELATIVE PERCENT: 3 %
EXPIRATION DATE: NORMAL
HCT VFR BLD CALC: 30.1 % (ref 36–46)
HEMOGLOBIN: 10 G/DL (ref 12–16)
LYMPHOCYTES # BLD: 17 %
MCH RBC QN AUTO: 31.4 PG (ref 26–34)
MCHC RBC AUTO-ENTMCNC: 33.2 G/DL (ref 31–37)
MCV RBC AUTO: 94.6 FL (ref 80–100)
MONOCYTES # BLD: 6 %
PDW BLD-RTO: 16 % (ref 12.5–15.4)
PLATELET # BLD: 131 K/UL (ref 140–450)
PLATELET ESTIMATE: ABNORMAL
PMV BLD AUTO: 8.9 FL (ref 6–12)
RBC # BLD: 3.18 M/UL (ref 4–5.2)
RBC # BLD: ABNORMAL 10*6/UL
SEG NEUTROPHILS: 73 %
SEGMENTED NEUTROPHILS ABSOLUTE COUNT: 3.8 K/UL (ref 1.8–7.7)
TRANSFUSION STATUS: NORMAL
UNIT DIVISION: 0
UNIT NUMBER: NORMAL
WBC # BLD: 5.3 K/UL (ref 3.5–11)
WBC # BLD: ABNORMAL 10*3/UL

## 2017-06-09 PROCEDURE — G8978 MOBILITY CURRENT STATUS: HCPCS

## 2017-06-09 PROCEDURE — G8979 MOBILITY GOAL STATUS: HCPCS

## 2017-06-09 PROCEDURE — 99238 HOSP IP/OBS DSCHRG MGMT 30/<: CPT | Performed by: PSYCHIATRY & NEUROLOGY

## 2017-06-09 PROCEDURE — G8980 MOBILITY D/C STATUS: HCPCS

## 2017-06-09 PROCEDURE — 85025 COMPLETE CBC W/AUTO DIFF WBC: CPT

## 2017-06-09 PROCEDURE — 36430 TRANSFUSION BLD/BLD COMPNT: CPT

## 2017-06-09 PROCEDURE — 97161 PT EVAL LOW COMPLEX 20 MIN: CPT

## 2017-06-09 PROCEDURE — 36415 COLL VENOUS BLD VENIPUNCTURE: CPT

## 2017-06-09 PROCEDURE — 6370000000 HC RX 637 (ALT 250 FOR IP): Performed by: PSYCHIATRY & NEUROLOGY

## 2017-06-09 RX ADMIN — CLOPIDOGREL 75 MG: 75 TABLET, FILM COATED ORAL at 08:08

## 2017-06-09 RX ADMIN — ASPIRIN 81 MG: 81 TABLET, COATED ORAL at 08:08

## 2017-06-09 ASSESSMENT — PAIN SCALES - GENERAL
PAINLEVEL_OUTOF10: 0
PAINLEVEL_OUTOF10: 1
PAINLEVEL_OUTOF10: 1

## 2017-06-09 ASSESSMENT — PAIN DESCRIPTION - LOCATION: LOCATION: HEAD

## 2017-06-09 ASSESSMENT — PAIN DESCRIPTION - PAIN TYPE: TYPE: ACUTE PAIN

## 2017-06-09 ASSESSMENT — PAIN DESCRIPTION - DESCRIPTORS: DESCRIPTORS: HEADACHE

## 2017-07-10 ENCOUNTER — OFFICE VISIT (OUTPATIENT)
Dept: NEUROLOGY | Age: 72
End: 2017-07-10
Payer: MEDICARE

## 2017-07-10 VITALS
WEIGHT: 292 LBS | HEIGHT: 68 IN | HEART RATE: 88 BPM | BODY MASS INDEX: 44.25 KG/M2 | SYSTOLIC BLOOD PRESSURE: 128 MMHG | DIASTOLIC BLOOD PRESSURE: 71 MMHG

## 2017-07-10 DIAGNOSIS — I67.1 CEREBRAL ANEURYSM: Primary | ICD-10-CM

## 2017-07-10 PROCEDURE — G8427 DOCREV CUR MEDS BY ELIG CLIN: HCPCS | Performed by: PSYCHIATRY & NEUROLOGY

## 2017-07-10 PROCEDURE — 1036F TOBACCO NON-USER: CPT | Performed by: PSYCHIATRY & NEUROLOGY

## 2017-07-10 PROCEDURE — 1123F ACP DISCUSS/DSCN MKR DOCD: CPT | Performed by: PSYCHIATRY & NEUROLOGY

## 2017-07-10 PROCEDURE — G8400 PT W/DXA NO RESULTS DOC: HCPCS | Performed by: PSYCHIATRY & NEUROLOGY

## 2017-07-10 PROCEDURE — 99215 OFFICE O/P EST HI 40 MIN: CPT | Performed by: PSYCHIATRY & NEUROLOGY

## 2017-07-10 PROCEDURE — G8417 CALC BMI ABV UP PARAM F/U: HCPCS | Performed by: PSYCHIATRY & NEUROLOGY

## 2017-07-10 PROCEDURE — 1090F PRES/ABSN URINE INCON ASSESS: CPT | Performed by: PSYCHIATRY & NEUROLOGY

## 2017-07-10 PROCEDURE — 4040F PNEUMOC VAC/ADMIN/RCVD: CPT | Performed by: PSYCHIATRY & NEUROLOGY

## 2017-07-10 PROCEDURE — 3014F SCREEN MAMMO DOC REV: CPT | Performed by: PSYCHIATRY & NEUROLOGY

## 2017-07-10 PROCEDURE — 3017F COLORECTAL CA SCREEN DOC REV: CPT | Performed by: PSYCHIATRY & NEUROLOGY

## 2017-07-24 ENCOUNTER — HOSPITAL ENCOUNTER (OUTPATIENT)
Dept: NON INVASIVE DIAGNOSTICS | Age: 72
Discharge: HOME | End: 2017-07-24
Payer: MEDICARE

## 2017-07-24 LAB
LV EF: 55 %
LVEF MODALITY: NORMAL

## 2017-07-24 PROCEDURE — 93306 TTE W/DOPPLER COMPLETE: CPT

## 2017-11-09 ENCOUNTER — HOSPITAL ENCOUNTER (OUTPATIENT)
Age: 72
Discharge: HOME | End: 2017-11-09
Payer: MEDICARE

## 2017-11-09 ENCOUNTER — HOSPITAL ENCOUNTER (OUTPATIENT)
Dept: WOMENS IMAGING | Age: 72
Discharge: HOME | End: 2017-11-09
Payer: MEDICARE

## 2017-11-09 DIAGNOSIS — Z12.39 SCREENING BREAST EXAMINATION: ICD-10-CM

## 2017-11-09 LAB
ALT SERPL-CCNC: 14 U/L (ref 5–33)
ANION GAP SERPL CALCULATED.3IONS-SCNC: 13 MMOL/L (ref 9–17)
BUN BLDV-MCNC: 26 MG/DL (ref 8–23)
CHLORIDE BLD-SCNC: 104 MMOL/L (ref 98–107)
CHOLESTEROL/HDL RATIO: 3.1
CHOLESTEROL: 167 MG/DL
CO2: 26 MMOL/L (ref 20–31)
CREAT SERPL-MCNC: 1 MG/DL (ref 0.5–0.9)
CREATININE URINE: 134.7 MG/DL (ref 28–217)
ESTIMATED AVERAGE GLUCOSE: 103 MG/DL
GFR AFRICAN AMERICAN: >60 ML/MIN
GFR NON-AFRICAN AMERICAN: 55 ML/MIN
GFR SERPL CREATININE-BSD FRML MDRD: ABNORMAL ML/MIN/{1.73_M2}
GFR SERPL CREATININE-BSD FRML MDRD: ABNORMAL ML/MIN/{1.73_M2}
HBA1C MFR BLD: 5.2 % (ref 4–6)
HDLC SERPL-MCNC: 54 MG/DL
LDL CHOLESTEROL: 88 MG/DL (ref 0–130)
MICROALBUMIN/CREAT 24H UR: <12 MG/L
MICROALBUMIN/CREAT UR-RTO: NORMAL MCG/MG CREAT
POTASSIUM SERPL-SCNC: 4.7 MMOL/L (ref 3.7–5.3)
SODIUM BLD-SCNC: 143 MMOL/L (ref 135–144)
TRIGL SERPL-MCNC: 126 MG/DL
URIC ACID: 6.5 MG/DL (ref 2.4–5.7)
VLDLC SERPL CALC-MCNC: NORMAL MG/DL (ref 1–30)

## 2017-11-09 PROCEDURE — 84460 ALANINE AMINO (ALT) (SGPT): CPT

## 2017-11-09 PROCEDURE — 84520 ASSAY OF UREA NITROGEN: CPT

## 2017-11-09 PROCEDURE — 84550 ASSAY OF BLOOD/URIC ACID: CPT

## 2017-11-09 PROCEDURE — 77063 BREAST TOMOSYNTHESIS BI: CPT

## 2017-11-09 PROCEDURE — 82565 ASSAY OF CREATININE: CPT

## 2017-11-09 PROCEDURE — 82043 UR ALBUMIN QUANTITATIVE: CPT

## 2017-11-09 PROCEDURE — 36415 COLL VENOUS BLD VENIPUNCTURE: CPT

## 2017-11-09 PROCEDURE — 82570 ASSAY OF URINE CREATININE: CPT

## 2017-11-09 PROCEDURE — 80061 LIPID PANEL: CPT

## 2017-11-09 PROCEDURE — 83036 HEMOGLOBIN GLYCOSYLATED A1C: CPT

## 2017-11-09 PROCEDURE — 80051 ELECTROLYTE PANEL: CPT

## 2017-12-06 ENCOUNTER — HOSPITAL ENCOUNTER (OUTPATIENT)
Dept: INTERVENTIONAL RADIOLOGY/VASCULAR | Age: 72
Discharge: HOME | End: 2017-12-06
Attending: PSYCHIATRY & NEUROLOGY
Payer: MEDICARE

## 2017-12-06 VITALS
TEMPERATURE: 98.2 F | OXYGEN SATURATION: 98 % | RESPIRATION RATE: 21 BRPM | WEIGHT: 280 LBS | HEART RATE: 83 BPM | HEIGHT: 68 IN | SYSTOLIC BLOOD PRESSURE: 118 MMHG | BODY MASS INDEX: 42.44 KG/M2 | DIASTOLIC BLOOD PRESSURE: 67 MMHG

## 2017-12-06 DIAGNOSIS — I72.9 ANEURYSM (HCC): ICD-10-CM

## 2017-12-06 LAB
ANION GAP SERPL CALCULATED.3IONS-SCNC: 15 MMOL/L (ref 9–17)
BUN BLDV-MCNC: 23 MG/DL (ref 8–23)
BUN/CREAT BLD: ABNORMAL (ref 9–20)
CALCIUM SERPL-MCNC: 8.6 MG/DL (ref 8.6–10.4)
CHLORIDE BLD-SCNC: 104 MMOL/L (ref 98–107)
CO2: 23 MMOL/L (ref 20–31)
CREAT SERPL-MCNC: 0.95 MG/DL (ref 0.5–0.9)
GFR AFRICAN AMERICAN: >60 ML/MIN
GFR NON-AFRICAN AMERICAN: 58 ML/MIN
GFR SERPL CREATININE-BSD FRML MDRD: ABNORMAL ML/MIN/{1.73_M2}
GFR SERPL CREATININE-BSD FRML MDRD: ABNORMAL ML/MIN/{1.73_M2}
GLUCOSE BLD-MCNC: 121 MG/DL (ref 70–99)
HCT VFR BLD CALC: 35.4 % (ref 36.3–47.1)
HEMOGLOBIN: 11.1 G/DL (ref 11.9–15.1)
INR BLD: 0.9
MCH RBC QN AUTO: 30.6 PG (ref 25.2–33.5)
MCHC RBC AUTO-ENTMCNC: 31.4 G/DL (ref 28.4–34.8)
MCV RBC AUTO: 97.5 FL (ref 82.6–102.9)
PARTIAL THROMBOPLASTIN TIME: 24 SEC (ref 21.3–31.3)
PDW BLD-RTO: 14.3 % (ref 11.8–14.4)
PLATELET # BLD: 123 K/UL (ref 138–453)
PMV BLD AUTO: 10.3 FL (ref 8.1–13.5)
POTASSIUM SERPL-SCNC: 4.3 MMOL/L (ref 3.7–5.3)
PROTHROMBIN TIME: 10.1 SEC (ref 9.4–12.6)
RBC # BLD: 3.63 M/UL (ref 3.95–5.11)
SODIUM BLD-SCNC: 142 MMOL/L (ref 135–144)
WBC # BLD: 4.4 K/UL (ref 3.5–11.3)

## 2017-12-06 PROCEDURE — 36226 PLACE CATH VERTEBRAL ART: CPT | Performed by: PSYCHIATRY & NEUROLOGY

## 2017-12-06 PROCEDURE — C1769 GUIDE WIRE: HCPCS

## 2017-12-06 PROCEDURE — 80048 BASIC METABOLIC PNL TOTAL CA: CPT

## 2017-12-06 PROCEDURE — 85027 COMPLETE CBC AUTOMATED: CPT

## 2017-12-06 PROCEDURE — 6360000002 HC RX W HCPCS

## 2017-12-06 PROCEDURE — 36224 PLACE CATH CAROTD ART: CPT | Performed by: PSYCHIATRY & NEUROLOGY

## 2017-12-06 PROCEDURE — 6370000000 HC RX 637 (ALT 250 FOR IP)

## 2017-12-06 PROCEDURE — 6360000004 HC RX CONTRAST MEDICATION: Performed by: PSYCHIATRY & NEUROLOGY

## 2017-12-06 PROCEDURE — 85610 PROTHROMBIN TIME: CPT

## 2017-12-06 PROCEDURE — 99152 MOD SED SAME PHYS/QHP 5/>YRS: CPT | Performed by: PSYCHIATRY & NEUROLOGY

## 2017-12-06 PROCEDURE — 7100000010 HC PHASE II RECOVERY - FIRST 15 MIN

## 2017-12-06 PROCEDURE — 85730 THROMBOPLASTIN TIME PARTIAL: CPT

## 2017-12-06 PROCEDURE — 2580000003 HC RX 258: Performed by: PSYCHIATRY & NEUROLOGY

## 2017-12-06 PROCEDURE — 7100000011 HC PHASE II RECOVERY - ADDTL 15 MIN

## 2017-12-06 PROCEDURE — 2500000003 HC RX 250 WO HCPCS

## 2017-12-06 RX ORDER — IODIXANOL 270 MG/ML
52 INJECTION, SOLUTION INTRAVASCULAR
Status: COMPLETED | OUTPATIENT
Start: 2017-12-06 | End: 2017-12-06

## 2017-12-06 RX ORDER — ACETAMINOPHEN 325 MG/1
650 TABLET ORAL EVERY 4 HOURS PRN
Status: DISCONTINUED | OUTPATIENT
Start: 2017-12-06 | End: 2017-12-09 | Stop reason: HOSPADM

## 2017-12-06 RX ORDER — SODIUM CHLORIDE 9 MG/ML
INJECTION, SOLUTION INTRAVENOUS CONTINUOUS
Status: DISCONTINUED | OUTPATIENT
Start: 2017-12-06 | End: 2017-12-09 | Stop reason: HOSPADM

## 2017-12-06 RX ADMIN — SODIUM CHLORIDE: 9 INJECTION, SOLUTION INTRAVENOUS at 07:28

## 2017-12-06 RX ADMIN — IODIXANOL 52 ML: 270 INJECTION, SOLUTION INTRAVASCULAR at 09:12

## 2017-12-06 NOTE — PROGRESS NOTES
Discharge instructions given to pt. And family with verbal understanding. IV dc'd. Dressed per self.

## 2017-12-06 NOTE — PROGRESS NOTES
Patient admitted, consent signed, all questions answered. Pt ready for procedure. Call light to reach with side rails up 2 of 2.

## 2017-12-06 NOTE — H&P
day. She has never used smokeless tobacco.   reports that she does not drink alcohol. reports that she does not use drugs. Family History  family history includes Arthritis in her son; Colon Cancer in her brother; Diabetes in her paternal grandmother; Heart Attack in her brother; Heart Disease in her mother; High Blood Pressure in her son; Kidney Disease in her mother; No Known Problems in her maternal grandfather, maternal grandmother, and paternal grandfather. Review of Systems:  CONSTITUTIONAL:  negative for fevers    EYES:  negative for double vision     HEENT:  negative for tinnitus   RESPIRATORY:  negative for cough, shortness of breath    CARDIOVASCULAR:  negative for chest pain, palpitations   GASTROINTESTINAL:  negative for nausea   GENITOURINARY:  negative for hematuria   MUSCULOSKELETAL:  negative for neck pain    NEUROLOGICAL:  See HPI   PSYCHIATRIC:  negative for anxiety      Review of systems otherwise negative. OBJECTIVE:   Vitals: There were no vitals taken for this visit. On exam today: Patient is AAO x3, following commands. CN II-XII grossly intact, pupils 2 mm reactive to light bilaterally. Normal tone in four extremities. Normal sensory exam to LT and pain. Muscle strength is 5/5 in b/l UEs -5/5 in proximal left LE, 5/5 distal left LE, 4/5 in RLE  DTRs : +1 in bilateral biceps and knees. Cerebellar exam: No nystagmus. Lungs sounds clear to auscultation bilaterally. Heart exam: normal S1,S2 sounds,RRR,no murmers. Abdomen was soft, NT,ND with positive bowel sounds. Normal bilateral radial and pedal pulses.        LABS:     CBC:   Lab Results   Component Value Date    WBC 5.3 06/09/2017    RBC 3.18 06/09/2017    RBC 3.41 04/30/2012    HGB 10.0 06/09/2017    HCT 30.1 06/09/2017    MCV 94.6 06/09/2017    MCH 31.4 06/09/2017    MCHC 33.2 06/09/2017    RDW 16.0 06/09/2017     06/09/2017     04/30/2012    MPV 8.9 06/09/2017     BMP:    Lab Results   Component Value Date

## 2017-12-06 NOTE — PROCEDURES
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 Neurological exam performed and unchanged from initial H&P or consult    Anesthesia: IV Moderate Sedation  Complications: none    Intra-Operative EXAM:  Patient sedated with unchanged limited neurological exam    EBL: < Minimal      Cc            Specimens: Were not Obtained  Contrast:     Visipaque 270 low osmolar 52 Cc             Fluoro: 9.9 min    Findings:  Please see dictated Radiology note for further details  1. Incomplete opacification of the giant left cavernous ica aneurysm s/p previous pipeline and coil embolization on 6/7/17 with residual aneurysm measuring 4.5 mm x 9.5 mm. Danny score: class III   2. The pipe line stent shows good wall apposition with no in stent stenosis nor thrombosis. POST-PROCEDURAL EXAM :   Stable neurological Exam  Neurological exam performed and unchanged from initial H&P or consult    Closure:  right Vascade 5   F        POST-PROCEDURAL MONITORING : see orders  Disposition: Recovery room    Recommendations:  1. Do not bend right leg for 3 hours. 2. Groin checks per protocol. 3. Neuro checks per protocol. 4. Peripheral pulse checks per protocol. 5.         Patient to follow up with Dr Isabella Minaya in the clinic on 12/19/17 and will be scheduled for residual aneurysm treatment on 12/21/17.      Danilo Constantino  [facility]

## 2017-12-06 NOTE — PROGRESS NOTES
Pt. Dangled on the edge of the stretcher. Tolerated well. Assisted to the bathroom. Gait is fairly steady. Voided post op. Assisted to the chair. Safe guard had been removed prior to pt. Getting up. No drainage noted from rt. Groin site.

## 2017-12-12 ENCOUNTER — ANESTHESIA EVENT (OUTPATIENT)
Dept: INTERVENTIONAL RADIOLOGY/VASCULAR | Age: 72
End: 2017-12-12

## 2017-12-12 ENCOUNTER — ANESTHESIA (OUTPATIENT)
Dept: INTERVENTIONAL RADIOLOGY/VASCULAR | Age: 72
End: 2017-12-12

## 2017-12-12 PROCEDURE — 3700000000 HC ANESTHESIA ATTENDED CARE

## 2017-12-12 PROCEDURE — 7100000000 HC PACU RECOVERY - FIRST 15 MIN

## 2017-12-12 PROCEDURE — 3700000001 HC ADD 15 MINUTES (ANESTHESIA)

## 2017-12-12 PROCEDURE — 7100000001 HC PACU RECOVERY - ADDTL 15 MIN

## 2017-12-19 ENCOUNTER — OFFICE VISIT (OUTPATIENT)
Dept: NEUROLOGY | Age: 72
End: 2017-12-19
Payer: MEDICARE

## 2017-12-19 VITALS — HEART RATE: 93 BPM | HEIGHT: 68 IN | SYSTOLIC BLOOD PRESSURE: 148 MMHG | DIASTOLIC BLOOD PRESSURE: 88 MMHG

## 2017-12-19 DIAGNOSIS — Z09 FOLLOW-UP EXAM: Primary | ICD-10-CM

## 2017-12-19 DIAGNOSIS — I67.1 BRAIN ANEURYSM: ICD-10-CM

## 2017-12-19 PROCEDURE — 99212 OFFICE O/P EST SF 10 MIN: CPT | Performed by: NEUROLOGICAL SURGERY

## 2017-12-19 NOTE — PROGRESS NOTES
Endovascular Neurosurgery Clinic follow up note    Pt Name: Hasmukh Romo  MRN: [id number]  YOB: 1945  Date of evaluation: 12/19/2017  Primary Care Physician: Candy Antunez      SUBJECTIVE:   History of Chief Complaint:    Hasmukh Romo is a 81 y.o. female with PMH of DM,HTN,HLP who s/p pipeline and coil embolization of giant left cavernous ica aneurysm on 6/7/17. She had a follow up cerebral angiogram on 12/6/17 which showed residual aneurysm measuring 4.5 mm x 9.5 mm, Danny score: class III. Patient is currently on aspirin and plavix. She is scheduled for pipeline embolization again of the residual aneurysm on 12/21/17. She currently denies any new neurological complains and has been doing well since her last angiogram.     Allergies  is allergic to dicloxacillin and pcn [penicillins]. Medications  Prior to Admission medications    Medication Sig Start Date End Date Taking? Authorizing Provider   aspirin 325 MG tablet Take 325 mg by mouth daily    Yes Historical Provider, MD   pravastatin (PRAVACHOL) 40 MG tablet Take 40 mg by mouth nightly   Yes Historical Provider, MD   lisinopril-hydrochlorothiazide (PRINZIDE;ZESTORETIC) 20-12.5 MG per tablet Take 2 tablets by mouth daily   Yes Historical Provider, MD   allopurinol (ZYLOPRIM) 300 MG tablet Take 300 mg by mouth daily   Yes Historical Provider, MD   clopidogrel (PLAVIX) 75 MG tablet Take 75 mg by mouth daily   Yes Historical Provider, MD    Scheduled Meds:  Continuous Infusions:  PRN Meds:.  Past Medical History   has a past medical history of Aneurysm, cerebral; Arthritis; Breast cancer ([facility] [address] Diabetes mellitus ([facility] [address] H/O transfusion; Hyperlipidemia; Hypertension; Porphyria ([facility] [address] and Wears glasses. Past Surgical History   has a past surgical history that includes Hysterectomy (1989) and Breast lumpectomy (Left, 2011). Social History   reports that she quit smoking about 13 years ago. Her smoking use included Cigarettes.  She started

## 2017-12-21 ENCOUNTER — HOSPITAL ENCOUNTER (INPATIENT)
Dept: INTERVENTIONAL RADIOLOGY/VASCULAR | Age: 72
LOS: 1 days | Discharge: HOME | DRG: 026 | End: 2017-12-22
Attending: PSYCHIATRY & NEUROLOGY | Admitting: PSYCHIATRY & NEUROLOGY
Payer: MEDICARE

## 2017-12-21 VITALS — TEMPERATURE: 94.9 F | SYSTOLIC BLOOD PRESSURE: 165 MMHG | DIASTOLIC BLOOD PRESSURE: 87 MMHG | OXYGEN SATURATION: 98 %

## 2017-12-21 DIAGNOSIS — I67.1 ANEURYSM OF RIGHT INTERNAL CAROTID ARTERY: ICD-10-CM

## 2017-12-21 DIAGNOSIS — I67.1 ANEURYSM OF LEFT INTERNAL CAROTID ARTERY: Primary | ICD-10-CM

## 2017-12-21 DIAGNOSIS — I72.9 ANEURYSM (HCC): ICD-10-CM

## 2017-12-21 LAB
ABO/RH: NORMAL
ACTIVATED CLOTTING TIME: 135 SEC (ref 79–149)
ACTIVATED CLOTTING TIME: 148 SEC (ref 79–149)
ACTIVATED CLOTTING TIME: 233 SEC (ref 79–149)
ACTIVATED CLOTTING TIME: 245 SEC (ref 79–149)
ANTIBODY SCREEN: NEGATIVE
ARM BAND NUMBER: NORMAL
BLOOD BANK SPECIMEN: NORMAL
COLLAGEN ADENOSINE-5'-DIPHOSPHATE (ADP) TIME: 95 SEC (ref 67–112)
COLLAGEN EPINEPHRINE TIME: 121 SEC (ref 85–172)
CULTURE: NORMAL
EKG ATRIAL RATE: 73 BPM
EKG P AXIS: 84 DEGREES
EKG P-R INTERVAL: 162 MS
EKG Q-T INTERVAL: 398 MS
EKG QRS DURATION: 92 MS
EKG QTC CALCULATION (BAZETT): 438 MS
EKG R AXIS: 48 DEGREES
EKG T AXIS: 58 DEGREES
EKG VENTRICULAR RATE: 73 BPM
EXPIRATION DATE: NORMAL
GLUCOSE BLD-MCNC: 125 MG/DL (ref 65–105)
GLUCOSE BLD-MCNC: 95 MG/DL (ref 65–105)
Lab: NORMAL
Lab: NORMAL
PLATELET FUNCTION INTERP: NORMAL
POC POTASSIUM: 4 MMOL/L (ref 3.5–4.5)
SPECIMEN DESCRIPTION: NORMAL
SPECIMEN DESCRIPTION: NORMAL
STATUS: NORMAL
STATUS: NORMAL

## 2017-12-21 PROCEDURE — 36224 PLACE CATH CAROTD ART: CPT | Performed by: PSYCHIATRY & NEUROLOGY

## 2017-12-21 PROCEDURE — 3700000001 HC ADD 15 MINUTES (ANESTHESIA)

## 2017-12-21 PROCEDURE — 93005 ELECTROCARDIOGRAM TRACING: CPT

## 2017-12-21 PROCEDURE — 2580000003 HC RX 258: Performed by: SPECIALIST

## 2017-12-21 PROCEDURE — 85576 BLOOD PLATELET AGGREGATION: CPT

## 2017-12-21 PROCEDURE — 6360000004 HC RX CONTRAST MEDICATION: Performed by: PSYCHIATRY & NEUROLOGY

## 2017-12-21 PROCEDURE — 85347 COAGULATION TIME ACTIVATED: CPT

## 2017-12-21 PROCEDURE — 03VG3BZ RESTRICTION OF INTRACRANIAL ARTERY WITH BIOACTIVE INTRALUMINAL DEVICE, PERCUTANEOUS APPROACH: ICD-10-PCS | Performed by: PSYCHIATRY & NEUROLOGY

## 2017-12-21 PROCEDURE — 2500000003 HC RX 250 WO HCPCS: Performed by: SPECIALIST

## 2017-12-21 PROCEDURE — 86900 BLOOD TYPING SEROLOGIC ABO: CPT

## 2017-12-21 PROCEDURE — C1725 CATH, TRANSLUMIN NON-LASER: HCPCS

## 2017-12-21 PROCEDURE — 6360000002 HC RX W HCPCS: Performed by: SPECIALIST

## 2017-12-21 PROCEDURE — 2580000003 HC RX 258

## 2017-12-21 PROCEDURE — 61624 TCAT PERM OCCLS/EMBOLJ CNS: CPT | Performed by: PSYCHIATRY & NEUROLOGY

## 2017-12-21 PROCEDURE — 75898 FOLLOW-UP ANGIOGRAPHY: CPT | Performed by: PSYCHIATRY & NEUROLOGY

## 2017-12-21 PROCEDURE — 3700000000 HC ANESTHESIA ATTENDED CARE

## 2017-12-21 PROCEDURE — 6360000002 HC RX W HCPCS

## 2017-12-21 PROCEDURE — 61650 EVASC PRLNG ADMN RX AGNT 1ST: CPT | Performed by: PSYCHIATRY & NEUROLOGY

## 2017-12-21 PROCEDURE — 82947 ASSAY GLUCOSE BLOOD QUANT: CPT

## 2017-12-21 PROCEDURE — 7100000000 HC PACU RECOVERY - FIRST 15 MIN

## 2017-12-21 PROCEDURE — 75894 X-RAYS TRANSCATH THERAPY: CPT | Performed by: PSYCHIATRY & NEUROLOGY

## 2017-12-21 PROCEDURE — 87641 MR-STAPH DNA AMP PROBE: CPT

## 2017-12-21 PROCEDURE — 87081 CULTURE SCREEN ONLY: CPT

## 2017-12-21 PROCEDURE — 2000000003 HC NEURO ICU R&B

## 2017-12-21 PROCEDURE — 2500000003 HC RX 250 WO HCPCS

## 2017-12-21 PROCEDURE — 7100000001 HC PACU RECOVERY - ADDTL 15 MIN

## 2017-12-21 PROCEDURE — 86850 RBC ANTIBODY SCREEN: CPT

## 2017-12-21 PROCEDURE — 86901 BLOOD TYPING SEROLOGIC RH(D): CPT

## 2017-12-21 PROCEDURE — 87086 URINE CULTURE/COLONY COUNT: CPT

## 2017-12-21 PROCEDURE — 84132 ASSAY OF SERUM POTASSIUM: CPT

## 2017-12-21 RX ORDER — ACETAMINOPHEN 500 MG
1000 TABLET ORAL EVERY 6 HOURS PRN
COMMUNITY

## 2017-12-21 RX ORDER — LIDOCAINE HYDROCHLORIDE 10 MG/ML
INJECTION, SOLUTION EPIDURAL; INFILTRATION; INTRACAUDAL; PERINEURAL PRN
Status: DISCONTINUED | OUTPATIENT
Start: 2017-12-21 | End: 2017-12-21 | Stop reason: SDUPTHER

## 2017-12-21 RX ORDER — CLOPIDOGREL BISULFATE 75 MG/1
75 TABLET ORAL DAILY
Status: DISCONTINUED | OUTPATIENT
Start: 2017-12-22 | End: 2017-12-22 | Stop reason: HOSPADM

## 2017-12-21 RX ORDER — ASPIRIN 325 MG
325 TABLET ORAL DAILY
Status: DISCONTINUED | OUTPATIENT
Start: 2017-12-22 | End: 2017-12-22 | Stop reason: HOSPADM

## 2017-12-21 RX ORDER — SODIUM CHLORIDE 9 MG/ML
INJECTION, SOLUTION INTRAVENOUS CONTINUOUS
Status: DISCONTINUED | OUTPATIENT
Start: 2017-12-21 | End: 2017-12-22 | Stop reason: HOSPADM

## 2017-12-21 RX ORDER — CLINDAMYCIN PHOSPHATE 150 MG/ML
INJECTION, SOLUTION INTRAVENOUS PRN
Status: DISCONTINUED | OUTPATIENT
Start: 2017-12-21 | End: 2017-12-21 | Stop reason: SDUPTHER

## 2017-12-21 RX ORDER — HEPARIN SODIUM 1000 [USP'U]/ML
INJECTION, SOLUTION INTRAVENOUS; SUBCUTANEOUS PRN
Status: DISCONTINUED | OUTPATIENT
Start: 2017-12-21 | End: 2017-12-21

## 2017-12-21 RX ORDER — ALLOPURINOL 300 MG/1
300 TABLET ORAL DAILY
Status: DISCONTINUED | OUTPATIENT
Start: 2017-12-22 | End: 2017-12-22 | Stop reason: HOSPADM

## 2017-12-21 RX ORDER — SODIUM CHLORIDE, SODIUM LACTATE, POTASSIUM CHLORIDE, CALCIUM CHLORIDE 600; 310; 30; 20 MG/100ML; MG/100ML; MG/100ML; MG/100ML
INJECTION, SOLUTION INTRAVENOUS CONTINUOUS PRN
Status: DISCONTINUED | OUTPATIENT
Start: 2017-12-21 | End: 2017-12-21 | Stop reason: SDUPTHER

## 2017-12-21 RX ORDER — ACETAMINOPHEN 325 MG/1
650 TABLET ORAL EVERY 4 HOURS PRN
Status: DISCONTINUED | OUTPATIENT
Start: 2017-12-21 | End: 2017-12-22 | Stop reason: HOSPADM

## 2017-12-21 RX ORDER — ONDANSETRON 2 MG/ML
INJECTION INTRAMUSCULAR; INTRAVENOUS PRN
Status: DISCONTINUED | OUTPATIENT
Start: 2017-12-21 | End: 2017-12-21 | Stop reason: SDUPTHER

## 2017-12-21 RX ORDER — IODIXANOL 270 MG/ML
85 INJECTION, SOLUTION INTRAVASCULAR
Status: COMPLETED | OUTPATIENT
Start: 2017-12-21 | End: 2017-12-21

## 2017-12-21 RX ORDER — GLYCOPYRROLATE 0.2 MG/ML
INJECTION INTRAMUSCULAR; INTRAVENOUS PRN
Status: DISCONTINUED | OUTPATIENT
Start: 2017-12-21 | End: 2017-12-21 | Stop reason: SDUPTHER

## 2017-12-21 RX ORDER — PROTAMINE SULFATE 10 MG/ML
INJECTION, SOLUTION INTRAVENOUS PRN
Status: DISCONTINUED | OUTPATIENT
Start: 2017-12-21 | End: 2017-12-21 | Stop reason: SDUPTHER

## 2017-12-21 RX ORDER — PROPOFOL 10 MG/ML
INJECTION, EMULSION INTRAVENOUS PRN
Status: DISCONTINUED | OUTPATIENT
Start: 2017-12-21 | End: 2017-12-21 | Stop reason: SDUPTHER

## 2017-12-21 RX ORDER — LISINOPRIL AND HYDROCHLOROTHIAZIDE 20; 12.5 MG/1; MG/1
2 TABLET ORAL DAILY
Status: DISCONTINUED | OUTPATIENT
Start: 2017-12-22 | End: 2017-12-22 | Stop reason: HOSPADM

## 2017-12-21 RX ORDER — ROCURONIUM BROMIDE 10 MG/ML
INJECTION, SOLUTION INTRAVENOUS PRN
Status: DISCONTINUED | OUTPATIENT
Start: 2017-12-21 | End: 2017-12-21 | Stop reason: SDUPTHER

## 2017-12-21 RX ORDER — HEPARIN SODIUM 1000 [USP'U]/ML
INJECTION, SOLUTION INTRAVENOUS; SUBCUTANEOUS PRN
Status: DISCONTINUED | OUTPATIENT
Start: 2017-12-21 | End: 2017-12-21 | Stop reason: SDUPTHER

## 2017-12-21 RX ORDER — FENTANYL CITRATE 50 UG/ML
INJECTION, SOLUTION INTRAMUSCULAR; INTRAVENOUS PRN
Status: DISCONTINUED | OUTPATIENT
Start: 2017-12-21 | End: 2017-12-21 | Stop reason: SDUPTHER

## 2017-12-21 RX ORDER — PRAVASTATIN SODIUM 20 MG
40 TABLET ORAL NIGHTLY
Status: DISCONTINUED | OUTPATIENT
Start: 2017-12-22 | End: 2017-12-22 | Stop reason: HOSPADM

## 2017-12-21 RX ORDER — SODIUM CHLORIDE 9 MG/ML
INJECTION, SOLUTION INTRAVENOUS CONTINUOUS PRN
Status: DISCONTINUED | OUTPATIENT
Start: 2017-12-21 | End: 2017-12-21 | Stop reason: SDUPTHER

## 2017-12-21 RX ADMIN — PHENYLEPHRINE HYDROCHLORIDE 100 MCG: 10 INJECTION INTRAVENOUS at 09:50

## 2017-12-21 RX ADMIN — FENTANYL CITRATE 50 MCG: 50 INJECTION, SOLUTION INTRAMUSCULAR; INTRAVENOUS at 09:40

## 2017-12-21 RX ADMIN — IODIXANOL 85 ML: 270 INJECTION, SOLUTION INTRAVASCULAR at 11:30

## 2017-12-21 RX ADMIN — PROTAMINE SULFATE 50 MG: 10 INJECTION, SOLUTION INTRAVENOUS at 11:30

## 2017-12-21 RX ADMIN — SODIUM CHLORIDE, POTASSIUM CHLORIDE, SODIUM LACTATE AND CALCIUM CHLORIDE: 600; 310; 30; 20 INJECTION, SOLUTION INTRAVENOUS at 09:34

## 2017-12-21 RX ADMIN — HEPARIN SODIUM 7000 UNITS: 1000 INJECTION, SOLUTION INTRAVENOUS; SUBCUTANEOUS at 10:36

## 2017-12-21 RX ADMIN — CLINDAMYCIN PHOSPHATE 600 MG: 150 INJECTION, SOLUTION INTRAVENOUS at 10:38

## 2017-12-21 RX ADMIN — SODIUM CHLORIDE: 9 INJECTION, SOLUTION INTRAVENOUS at 10:58

## 2017-12-21 RX ADMIN — LIDOCAINE HYDROCHLORIDE 50 MG: 10 INJECTION, SOLUTION EPIDURAL; INFILTRATION; INTRACAUDAL; PERINEURAL at 09:40

## 2017-12-21 RX ADMIN — NEOSTIGMINE METHYLSULFATE 3 MG: 1 INJECTION, SOLUTION INTRAMUSCULAR; INTRAVENOUS; SUBCUTANEOUS at 11:56

## 2017-12-21 RX ADMIN — PHENYLEPHRINE HYDROCHLORIDE 100 MCG: 10 INJECTION INTRAVENOUS at 10:20

## 2017-12-21 RX ADMIN — ROCURONIUM BROMIDE 20 MG: 10 INJECTION INTRAVENOUS at 10:26

## 2017-12-21 RX ADMIN — PROTAMINE SULFATE 20 MG: 10 INJECTION, SOLUTION INTRAVENOUS at 11:39

## 2017-12-21 RX ADMIN — SODIUM CHLORIDE: 9 INJECTION, SOLUTION INTRAVENOUS at 09:49

## 2017-12-21 RX ADMIN — PHENYLEPHRINE HYDROCHLORIDE 30 MCG: 10 INJECTION INTRAVENOUS at 10:50

## 2017-12-21 RX ADMIN — PROPOFOL 160 MG: 10 INJECTION, EMULSION INTRAVENOUS at 09:40

## 2017-12-21 RX ADMIN — PHENYLEPHRINE HYDROCHLORIDE 100 MCG: 10 INJECTION INTRAVENOUS at 10:32

## 2017-12-21 RX ADMIN — PHENYLEPHRINE HYDROCHLORIDE 15 MCG/MIN: 10 INJECTION INTRAVENOUS at 10:45

## 2017-12-21 RX ADMIN — GLYCOPYRROLATE 0.4 MG: 0.2 INJECTION, SOLUTION INTRAMUSCULAR; INTRAVENOUS at 11:56

## 2017-12-21 RX ADMIN — ROCURONIUM BROMIDE 50 MG: 10 INJECTION INTRAVENOUS at 09:40

## 2017-12-21 RX ADMIN — PHENYLEPHRINE HYDROCHLORIDE 100 MCG: 10 INJECTION INTRAVENOUS at 09:55

## 2017-12-21 RX ADMIN — ONDANSETRON 4 MG: 2 INJECTION INTRAMUSCULAR; INTRAVENOUS at 09:55

## 2017-12-21 ASSESSMENT — PULMONARY FUNCTION TESTS
PIF_VALUE: 29
PIF_VALUE: 23
PIF_VALUE: 24
PIF_VALUE: 18
PIF_VALUE: 29
PIF_VALUE: 23
PIF_VALUE: 21
PIF_VALUE: 23
PIF_VALUE: 2
PIF_VALUE: 23
PIF_VALUE: 29
PIF_VALUE: 19
PIF_VALUE: 23
PIF_VALUE: 3
PIF_VALUE: 30
PIF_VALUE: 21
PIF_VALUE: 25
PIF_VALUE: 3
PIF_VALUE: 24
PIF_VALUE: 4
PIF_VALUE: 23
PIF_VALUE: 28
PIF_VALUE: 1
PIF_VALUE: 26
PIF_VALUE: 23
PIF_VALUE: 20
PIF_VALUE: 20
PIF_VALUE: 18
PIF_VALUE: 28
PIF_VALUE: 21
PIF_VALUE: 21
PIF_VALUE: 34
PIF_VALUE: 20
PIF_VALUE: 15
PIF_VALUE: 18
PIF_VALUE: 24
PIF_VALUE: 30
PIF_VALUE: 30
PIF_VALUE: 28
PIF_VALUE: 20
PIF_VALUE: 26
PIF_VALUE: 22
PIF_VALUE: 22
PIF_VALUE: 24
PIF_VALUE: 21
PIF_VALUE: 19
PIF_VALUE: 19
PIF_VALUE: 2
PIF_VALUE: 19
PIF_VALUE: 18
PIF_VALUE: 21
PIF_VALUE: 23
PIF_VALUE: 21
PIF_VALUE: 23
PIF_VALUE: 27
PIF_VALUE: 19
PIF_VALUE: 25
PIF_VALUE: 32
PIF_VALUE: 24
PIF_VALUE: 1
PIF_VALUE: 21
PIF_VALUE: 21
PIF_VALUE: 20
PIF_VALUE: 22
PIF_VALUE: 25
PIF_VALUE: 19
PIF_VALUE: 17
PIF_VALUE: 26
PIF_VALUE: 21
PIF_VALUE: 26
PIF_VALUE: 20
PIF_VALUE: 23
PIF_VALUE: 23
PIF_VALUE: 3
PIF_VALUE: 21
PIF_VALUE: 21
PIF_VALUE: 20
PIF_VALUE: 1
PIF_VALUE: 30
PIF_VALUE: 21
PIF_VALUE: 30
PIF_VALUE: 23
PIF_VALUE: 21
PIF_VALUE: 21
PIF_VALUE: 23
PIF_VALUE: 0
PIF_VALUE: 20
PIF_VALUE: 21
PIF_VALUE: 34
PIF_VALUE: 21
PIF_VALUE: 30
PIF_VALUE: 28
PIF_VALUE: 26
PIF_VALUE: 27
PIF_VALUE: 18
PIF_VALUE: 27
PIF_VALUE: 25
PIF_VALUE: 2
PIF_VALUE: 25
PIF_VALUE: 21
PIF_VALUE: 26
PIF_VALUE: 24
PIF_VALUE: 29
PIF_VALUE: 24
PIF_VALUE: 27
PIF_VALUE: 25
PIF_VALUE: 21
PIF_VALUE: 24
PIF_VALUE: 16
PIF_VALUE: 26
PIF_VALUE: 21
PIF_VALUE: 19
PIF_VALUE: 18
PIF_VALUE: 26
PIF_VALUE: 19
PIF_VALUE: 13
PIF_VALUE: 20
PIF_VALUE: 21
PIF_VALUE: 18
PIF_VALUE: 20
PIF_VALUE: 1
PIF_VALUE: 19
PIF_VALUE: 3
PIF_VALUE: 3
PIF_VALUE: 21
PIF_VALUE: 20
PIF_VALUE: 26
PIF_VALUE: 19
PIF_VALUE: 31
PIF_VALUE: 25
PIF_VALUE: 29
PIF_VALUE: 11
PIF_VALUE: 2
PIF_VALUE: 19
PIF_VALUE: 1
PIF_VALUE: 21
PIF_VALUE: 25
PIF_VALUE: 22
PIF_VALUE: 9
PIF_VALUE: 20
PIF_VALUE: 29
PIF_VALUE: 18
PIF_VALUE: 31
PIF_VALUE: 22
PIF_VALUE: 8
PIF_VALUE: 26
PIF_VALUE: 18
PIF_VALUE: 25

## 2017-12-21 ASSESSMENT — PAIN SCALES - GENERAL
PAINLEVEL_OUTOF10: 0

## 2017-12-21 ASSESSMENT — PAIN - FUNCTIONAL ASSESSMENT: PAIN_FUNCTIONAL_ASSESSMENT: 0-10

## 2017-12-21 ASSESSMENT — ENCOUNTER SYMPTOMS
STRIDOR: 0
SHORTNESS OF BREATH: 0

## 2017-12-21 NOTE — H&P
Department of Endovascular Neurosurgery  Fellow Pre-operative History and Physical        DIAGNOSIS:  Cerebral aneurysm    INDICATION:  Residual aneurysm    PROCEDURE:  Aneurysm embolization    CHIEF COMPLAINT:  Cerebral aneurysm    History Obtained From:  patient, electronic medical record    HISTORY OF PRESENT ILLNESS:      The patient is a 81 y.o. female with significant past medical history of a large left giant ICA aneurysm, which was treated by pipeline assisted coil embolization on June 7, 2017. She obtained a repeat angiogram on 12/6/2017, which revealed persistent aneurysm filling. She is referred now for re-treatment. She has continued daily aspirin and clopidogrel. Past Medical History:        Diagnosis Date    Aneurysm, cerebral 05/22/2017    Arthritis     gout    Breast cancer ([facility] [address] 2011    left-lumpectomy followed by chemo and radiation    Diabetes mellitus ([facility] [address]     not any more, ACTOS discontinued     H/O transfusion 2011    2 Units    Hyperlipidemia     ON RX    Hypertension     ON RX    Porphyria ([facility] [address]    [facility] Wears glasses      Past Surgical History:        Procedure Laterality Date    BREAST LUMPECTOMY Left 2011    [address]    TOTAL    OTHER SURGICAL HISTORY  06/2017    CEREBRA EMBO COILING WITH STENT     Medications Prior to Admission:   Current Outpatient Prescriptions on File Prior to Encounter   Medication Sig Dispense Refill    aspirin 325 MG tablet Take 325 mg by mouth daily       pravastatin (PRAVACHOL) 40 MG tablet Take 40 mg by mouth nightly      lisinopril-hydrochlorothiazide (PRINZIDE;ZESTORETIC) 20-12.5 MG per tablet Take 2 tablets by mouth daily      allopurinol (ZYLOPRIM) 300 MG tablet Take 300 mg by mouth daily      clopidogrel (PLAVIX) 75 MG tablet Take 75 mg by mouth daily       No current facility-administered medications on file prior to encounter.         Allergies:  Dicloxacillin and Pcn [penicillins]  History of allergic reaction to anesthesia:  No      Social History:   TOBACCO:   reports that she quit smoking about 13 years ago. Her smoking use included Cigarettes. She started smoking about 55 years ago. She smoked 1.00 pack per day. She has never used smokeless tobacco.  ETOH:   reports that she does not drink alcohol. DRUGS:   reports that she does not use drugs. Family History:       Problem Relation Age of Onset    Heart Disease Mother     Kidney Disease Mother      Damien Francis Other Father      DROWN    Heart Attack Brother     No Known Problems Maternal Grandmother     No Known Problems Maternal Grandfather     Diabetes Paternal Grandmother     No Known Problems Paternal Grandfather     Colon Cancer Brother     High Blood Pressure Son     Arthritis Son      REVIEW OF SYSTEMS:  Complete review of systems was performed and was negative except as stated in the patient's HPI. PHYSICAL EXAM:     VITALS:  /77   Pulse 72   Temp 97.3 °F (36.3 °C) (Temporal)   Resp 20   Ht 5' 8\" (1.727 m)   Wt 280 lb (127 kg)   LMP 12/20/2011 (Approximate)   SpO2 98%   BMI 42.57 kg/m²     Head/ENT:  mallampati grade 3    Heart:  RRR    Lungs:  cta bl    Extremities:  2+ bl pedal pulses    Neurologic:  Awake, alert, oriented to name, place and time. Cranial nerves II-XII are grossly intact. Motor is 5 out of 5 bilaterally. Cerebellar finger to nose, heel to shin intact. Sensory is intact.   Babinski down going, Romberg negative, and gait is normal.    DATA:  CBC:   Lab Results   Component Value Date    WBC 4.4 12/06/2017    RBC 3.63 12/06/2017    RBC 3.41 04/30/2012    HGB 11.1 12/06/2017    HCT 35.4 12/06/2017    MCV 97.5 12/06/2017    MCH 30.6 12/06/2017    MCHC 31.4 12/06/2017    RDW 14.3 12/06/2017     12/06/2017     04/30/2012    MPV 10.3 12/06/2017     CMP:    Lab Results   Component Value Date     12/06/2017    K 4.3 12/06/2017     12/06/2017    CO2 23 12/06/2017    BUN 23 12/06/2017 CREATININE 0.95 12/06/2017    GFRAA >60 12/06/2017    LABGLOM 58 12/06/2017    GLUCOSE 121 12/06/2017    GLUCOSE 94 04/30/2012    PROT 5.7 06/08/2017    LABALBU 3.0 06/08/2017    LABALBU 4.3 03/20/2012    CALCIUM 8.6 12/06/2017    BILITOT 0.60 06/08/2017    ALKPHOS 64 06/08/2017    AST 12 06/08/2017    ALT 14 11/09/2017     ASSESSMENT AND PLAN:    81year old woman with a giant left ICA aneurysm s/p PED assisted coiling in June 2017, found to have persistent aneurysm filling, presents now for re-treatment. -->proceed with aneurysm embolization with likely overlapping PED.

## 2017-12-21 NOTE — CONSULTS
residual aneurysm on angio earlier this month who came in for elective coiling of aneurysm. PED repair performed on 12/21.   Patient doing well post-op    Patient care will be discussed with attending, will reevaluate patient along with attending     PLAN/MEDICAL DECISION MAKING:  - ASA and plavix tomorrow  - Neuro checks per protocol  - SBP <160  - Art line in place, remove in the AM  - Continue telemetry monitoring   - Monitor I/O  - Cartwright in place, remove in AM   - Diet: DIET GENERAL;    DVT PROPHYLAXIS:  - SCD sleeves - Thigh High   - LYN stockings - Thigh High    DISPOSITION: Monitor Overnight        Keith Raymundo MD  Emergency Medicine Resident  Neuro Critical Care Service   Pager [phone]  12/21/2017     6:52 PM

## 2017-12-21 NOTE — ANESTHESIA PRE PROCEDURE
Department of Anesthesiology  Preprocedure Note       Name:  Cy Figueredo   Age:  81 y.o.  :  1945                                          MRN:  [id number]         Date:  2017      Surgeon: * No surgeons listed *    Procedure: * No procedures listed *    Medications prior to admission:   Prior to Admission medications    Medication Sig Start Date End Date Taking? Authorizing Provider   acetaminophen (TYLENOL) 500 MG tablet Take 1,000 mg by mouth every 6 hours as needed for Pain    Historical Provider, MD   aspirin 325 MG tablet Take 325 mg by mouth daily     Historical Provider, MD   pravastatin (PRAVACHOL) 40 MG tablet Take 40 mg by mouth nightly    Historical Provider, MD   lisinopril-hydrochlorothiazide (PRINZIDE;ZESTORETIC) 20-12.5 MG per tablet Take 2 tablets by mouth daily    Historical Provider, MD   allopurinol (ZYLOPRIM) 300 MG tablet Take 300 mg by mouth daily    Historical Provider, MD   clopidogrel (PLAVIX) 75 MG tablet Take 75 mg by mouth daily    Historical Provider, MD       Current medications:    Current Outpatient Prescriptions   Medication Sig Dispense Refill    acetaminophen (TYLENOL) 500 MG tablet Take 1,000 mg by mouth every 6 hours as needed for Pain      aspirin 325 MG tablet Take 325 mg by mouth daily       pravastatin (PRAVACHOL) 40 MG tablet Take 40 mg by mouth nightly      lisinopril-hydrochlorothiazide (PRINZIDE;ZESTORETIC) 20-12.5 MG per tablet Take 2 tablets by mouth daily      allopurinol (ZYLOPRIM) 300 MG tablet Take 300 mg by mouth daily      clopidogrel (PLAVIX) 75 MG tablet Take 75 mg by mouth daily       No current facility-administered medications for this visit. 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    MRI-safe endovascular aneurysm coil present Z98.890    Morbid obesity with BMI of 40.0-44.9, adult (HCC) E66.01, Z68.41    Aneurysm ([facility] I72.9       Past Medical History:        Diagnosis Date    Aneurysm, cerebral 05/22/2017    Arthritis     gout    Breast cancer ([facility] [address] 2011    left-lumpectomy followed by chemo and radiation    Diabetes mellitus ([facility] [address]     not any more, ACTOS discontinued     H/O transfusion 2011    2 Units    Hyperlipidemia     ON RX    Hypertension     ON RX    Porphyria ([facility] [address]     Wears glasses        Past Surgical History:        Procedure Laterality Date    BREAST LUMPECTOMY Left 2011    [facility] Rad[facility]    HYSTERECTOMY  1989    TOTAL    OTHER SURGICAL HISTORY  06/2017    CEREBRA EMBO COILING WITH STENT       Social History:    Social History   Substance Use Topics    Smoking status: Former Smoker     Packs/day: 1.00     Types: Cigarettes     Start date: 1963     Quit date: 9/26/2004    Smokeless tobacco: Never Used    Alcohol use No                                Counseling given: Not Answered      Vital Signs (Current): There were no vitals filed for this visit.                                            BP Readings from Last 3 Encounters:   12/21/17 139/77   12/19/17 (!) 148/88   12/06/17 118/67       NPO Status:                                                                                 BMI:   Wt Readings from Last 3 Encounters:   12/21/17 280 lb (127 kg)   12/06/17 280 lb (127 kg)   07/10/17 292 lb (132.5 kg)     There is no height or weight on file to calculate BMI.    CBC:   Lab Results   Component Value Date    WBC 4.4 12/06/2017    RBC 3.63 12/06/2017    RBC 3.41 04/30/2012    HGB 11.1 12/06/2017    HCT 35.4 12/06/2017    MCV 97.5 12/06/2017    RDW 14.3 12/06/2017     12/06/2017     04/30/2012       CMP:   Lab

## 2017-12-21 NOTE — OP NOTE
[facility]    NEUROENDOVASCULAR SERVICE: POST-OP NOTE: 12/21/2017    Pt Name: Mainor Alonso  MRN: [id number]  [place]: 1945  Date of Procedure: 12/21/2017  Primary Care Physician: Brandon Sagastume      Pre-Procedural Diagnosis:ICA aneurysm  Post-Procedural Diagnosis:ICA aneurysm      Procedure Performed:PED placement    Surgeon:   Rony Rodriguez MD    Fellow:  Consuelo Blanco MD     1st Assistant:  Rebecca Gamboa    PRE-PROCEDURAL EXAM:  MODIFIED SRUTHI SCORE: 0 - No symptoms at all. Neurological exam performed and unchanged from initial H&P or consult    Anesthesia: General Anesthesia  Complications: none    EBL: < Minimal      Cc            Contrast:     Visipaque 270 low osmolar 85 Cc             Fluoro: 24.6 min    Findings:  Please see dictated Radiology note for further details  Residual filling to the large distal ICA aneurysm was treated with a PED, which overlapped with a PED which had been deployed previously in June. Post treatment angiogram revealed expected contrast stagnation and no evidence of thromboembolic events. Danny score: class III    POST-PROCEDURAL EXAM :   Stable neurological Exam  Neurological exam performed and unchanged from initial H&P or consult    Closure:  right Vascade 6   F        POST-PROCEDURAL MONITORING : see orders  Disposition: Neuro ICU      Recommendations:  1. Right leg flat for 3 hours  2. sbp < 160  3. Aspirin and clopidogrel DAILY starting the morning of 12/22  4. Follow up with me in 2 weeks; with Dr. Sofia Isidro in 2-3 months.         Rony Rodriguez MD   Pager: [phone]  Stroke, [facility]  [address]  Electronically signed 12/21/2017 at 12:09 PM

## 2017-12-22 VITALS
TEMPERATURE: 98.4 F | SYSTOLIC BLOOD PRESSURE: 132 MMHG | BODY MASS INDEX: 42.44 KG/M2 | OXYGEN SATURATION: 97 % | HEART RATE: 72 BPM | RESPIRATION RATE: 17 BRPM | WEIGHT: 280 LBS | DIASTOLIC BLOOD PRESSURE: 53 MMHG | HEIGHT: 68 IN

## 2017-12-22 LAB
ANION GAP SERPL CALCULATED.3IONS-SCNC: 12 MMOL/L (ref 9–17)
BUN BLDV-MCNC: 18 MG/DL (ref 8–23)
BUN/CREAT BLD: ABNORMAL (ref 9–20)
CALCIUM SERPL-MCNC: 8.2 MG/DL (ref 8.6–10.4)
CHLORIDE BLD-SCNC: 101 MMOL/L (ref 98–107)
CO2: 24 MMOL/L (ref 20–31)
CREAT SERPL-MCNC: 0.99 MG/DL (ref 0.5–0.9)
CULTURE: NO GROWTH
CULTURE: NORMAL
GFR AFRICAN AMERICAN: >60 ML/MIN
GFR NON-AFRICAN AMERICAN: 55 ML/MIN
GFR SERPL CREATININE-BSD FRML MDRD: ABNORMAL ML/MIN/{1.73_M2}
GFR SERPL CREATININE-BSD FRML MDRD: ABNORMAL ML/MIN/{1.73_M2}
GLUCOSE BLD-MCNC: 106 MG/DL (ref 70–99)
HCT VFR BLD CALC: 32.1 % (ref 36.3–47.1)
HEMOGLOBIN: 10.6 G/DL (ref 11.9–15.1)
Lab: NORMAL
MCH RBC QN AUTO: 31.6 PG (ref 25.2–33.5)
MCHC RBC AUTO-ENTMCNC: 33 G/DL (ref 28.4–34.8)
MCV RBC AUTO: 95.8 FL (ref 82.6–102.9)
MRSA, DNA, NASAL: NORMAL
PDW BLD-RTO: 14.4 % (ref 11.8–14.4)
PLATELET # BLD: ABNORMAL K/UL (ref 138–453)
PLATELET, FLUORESCENCE: 123 K/UL (ref 138–453)
PLATELET, IMMATURE FRACTION: 1.6 % (ref 1.1–10.3)
PMV BLD AUTO: ABNORMAL FL (ref 8.1–13.5)
POTASSIUM SERPL-SCNC: 4.3 MMOL/L (ref 3.7–5.3)
RBC # BLD: 3.35 M/UL (ref 3.95–5.11)
SODIUM BLD-SCNC: 137 MMOL/L (ref 135–144)
SPECIMEN DESCRIPTION: NORMAL
SPECIMEN DESCRIPTION: NORMAL
STATUS: NORMAL
WBC # BLD: 5 K/UL (ref 3.5–11.3)

## 2017-12-22 PROCEDURE — 6370000000 HC RX 637 (ALT 250 FOR IP): Performed by: EMERGENCY MEDICINE

## 2017-12-22 PROCEDURE — 99233 SBSQ HOSP IP/OBS HIGH 50: CPT | Performed by: NEUROLOGICAL SURGERY

## 2017-12-22 PROCEDURE — 36415 COLL VENOUS BLD VENIPUNCTURE: CPT

## 2017-12-22 PROCEDURE — 80048 BASIC METABOLIC PNL TOTAL CA: CPT

## 2017-12-22 PROCEDURE — 94762 N-INVAS EAR/PLS OXIMTRY CONT: CPT

## 2017-12-22 PROCEDURE — 99239 HOSP IP/OBS DSCHRG MGMT >30: CPT | Performed by: PSYCHIATRY & NEUROLOGY

## 2017-12-22 PROCEDURE — 85027 COMPLETE CBC AUTOMATED: CPT

## 2017-12-22 RX ADMIN — ASPIRIN 325 MG: 325 TABLET, COATED ORAL at 08:08

## 2017-12-22 RX ADMIN — CLOPIDOGREL 75 MG: 75 TABLET, FILM COATED ORAL at 08:08

## 2017-12-22 RX ADMIN — LISINOPRIL AND HYDROCHLOROTHIAZIDE 2 TABLET: 12.5; 2 TABLET ORAL at 08:08

## 2017-12-22 RX ADMIN — ALLOPURINOL 300 MG: 300 TABLET ORAL at 08:08

## 2017-12-22 ASSESSMENT — PAIN SCALES - GENERAL: PAINLEVEL_OUTOF10: 0

## 2017-12-22 NOTE — PLAN OF CARE
Problem: Falls - Risk of  Goal: Absence of falls  Outcome: Met This Shift  Fall risk precautions in place. Bed in lowest position with wheels locked, bed alarm in place and activated,  non-skid socks on pt, fall risk id on pt, call light in reach, pt encouraged to call before getting out of bed and for any other needs or c/o.

## 2017-12-22 NOTE — FLOWSHEET NOTE
Pt discharged to home. Heplock and monitor removed. Discharge instructions given and explained to pt. Pt verbalized understanding. Pt denies any questions or concerns. Pt assisted to front door in wheelchair with all belongings, discharge paperwork, and educational pamphlets on disease process.

## 2017-12-22 NOTE — PLAN OF CARE
Problem: Falls - Risk of:  Goal: Will remain free from falls  Will remain free from falls   Outcome: Met This Shift    Goal: Absence of physical injury  Absence of physical injury   Outcome: Met This Shift      Problem: Infection:  Goal: Will remain free from infection  Will remain free from infection   Outcome: Ongoing      Problem: Safety:  Goal: Free from accidental physical injury  Free from accidental physical injury   Outcome: Met This Shift    Goal: Free from intentional harm  Free from intentional harm   Outcome: Met This Shift      Problem: Daily Care:  Goal: Daily care needs are met  Daily care needs are met   Outcome: Met This Shift      Problem: Pain:  Goal: Patient's pain/discomfort is manageable  Patient's pain/discomfort is manageable   Outcome: Met This Shift      Problem: Skin Integrity:  Goal: Skin integrity will stabilize  Skin integrity will stabilize   Outcome: Met This Shift      Problem: Discharge Planning:  Goal: Patients continuum of care needs are met  Patients continuum of care needs are met   Outcome: Met This Shift      Problem: Falls - Risk of  Goal: Absence of falls  Outcome: Met This Shift      Problem: Risk for Impaired Skin Integrity  Goal: Tissue integrity - skin and mucous membranes  Structural intactness and normal physiological function of skin and  mucous membranes.    Outcome: Met This Shift

## 2017-12-22 NOTE — DISCHARGE SUMMARY
ENDOVASCULAR NEUROSURGERY DISCHARGE NOTE  12/22/2017 1:51 PM  Subjective:   Admit Date: 12/21/2017  PCP: Chevy Noel    Patient is s/p PED repair June 2017 and found to have residual aneurysm on angio several months prior to admission, presented for elective coiling of aneurysm. Patient was doing well post operatively and is stable for discharge home at this time. Objective:   Vitals: BP (!) 132/53   Pulse 72   Temp 98.4 °F (36.9 °C) (Oral)   Resp 17   Ht 5' 8\" (1.727 m)   Wt 280 lb (127 kg)   LMP 12/20/2011 (Approximate)   SpO2 97%   BMI 42.57 kg/m²   PHYSICAL EXAM:  CONSTITUTIONAL:  Well developed, well nourished, alert and oriented x 3, in no acute distress.     HEAD:  normocephalic, atraumatic    EYES:  PERRLA, EOMI.   ENT:  moist mucous membranes   LUNGS:  Equal air entry bilaterally   CARDIOVASCULAR:  normal s1 / s2   ABDOMEN:  Soft, no rigidity   NECK supple, symmetric, no midline tenderness to palpation    BACK without midline tenderness, step-offs or deformities    EXTREMITIES Normal ROM with no deformities   NEUROLOGIC:  Mental Status:  A & O x3,awake             Cranial Nerves:    cranial nerves II-XII are grossly intact     Motor Exam:    Drift:  absent  Tone:  normal     Motor exam is symmetrical 5 out of 5 all extremities bilaterally     Sensory:    Touch:    Right Upper Extremity:  normal  Left Upper Extremity:  normal  Right Lower Extremity:  normal  Left Lower Extremity:  normal   SKIN No obvious ecchymosis, rashes, or lesions, dressing over fem access sight         Medications and labs:   Scheduled Meds:   lisinopril-hydrochlorothiazide  2 tablet Oral Daily    allopurinol  300 mg Oral Daily    pravastatin  40 mg Oral Nightly    aspirin  325 mg Oral Daily    clopidogrel  75 mg Oral Daily     Continuous Infusions:   sodium chloride       CBC:   Recent Labs      12/22/17   1037   WBC  5.0   HGB  10.6*   PLT  See Reflexed IPF Result     BMP:    Recent Labs      12/22/17   1037 NA  137   K  4.3   CL  101   CO2  24   BUN  18   CREATININE  0.99*   GLUCOSE  106*     Hepatic: No results for input(s): AST, ALT, ALB, BILITOT, ALKPHOS in the last 72 hours. Troponin: No results for input(s): TROPONINI in the last 72 hours. BNP: No results for input(s): BNP in the last 72 hours. Lipids: No results for input(s): CHOL, HDL in the last 72 hours. Invalid input(s): LDLCALCU  INR: No results for input(s): INR in the last 72 hours. Assessment and Discharge Instructions:   Post op elective coiling, doing well, stable for discharge home at this time to follow up as discussed. Discharge Instructions  1.  As discussed with Neuroendovascular surgeon    Discharge Medications   lisinopril-hydrochlorothiazide  2 tablet Oral Daily    allopurinol  300 mg Oral Daily    pravastatin  40 mg Oral Nightly    aspirin  325 mg Oral Daily    clopidogrel  75 mg Oral Daily     Diet:as tolerated    Activity: as discussed    DISPO: Home    Kandy Rosario DO  Stroke, [facility]  [id number] [place]  Electronically signed 12/22/2017 at 1:51 PM

## 2017-12-22 NOTE — PROGRESS NOTES
Department of Endovascular Neurosurgery  Fellow Progress Note      SUBJECTIVE:    No major overnight events. Doing well and eager to go home to prepare for a family [place] dinner. OBJECTIVE    Physical  VITALS:  BP (!) 147/70   Pulse 76   Temp 98.4 °F (36.9 °C) (Oral)   Resp 15   Ht 5' 8\" (1.727 m)   Wt 280 lb (127 kg)   LMP 12/20/2011 (Approximate)   SpO2 96%   BMI 42.57 kg/m²   NEUROLOGIC:  Awake, alert, oriented to name, place and time. Cranial nerves II-XII are grossly intact. Motor is 5 out of 5 bilaterally. Cerebellar finger to nose, heel to shin intact. Sensory is intact. Babinski down going, Romberg negative, and gait is normal.  GROIN: c/d/i, no hematoma. EXT: 2+ pedal pulses in the right foot.     Data  CBC:   Lab Results   Component Value Date    WBC 4.4 12/06/2017    RBC 3.63 12/06/2017    RBC 3.41 04/30/2012    HGB 11.1 12/06/2017    HCT 35.4 12/06/2017    MCV 97.5 12/06/2017    MCH 30.6 12/06/2017    MCHC 31.4 12/06/2017    RDW 14.3 12/06/2017     12/06/2017     04/30/2012    MPV 10.3 12/06/2017     CMP:    Lab Results   Component Value Date     12/06/2017    K 4.3 12/06/2017     12/06/2017    CO2 23 12/06/2017    BUN 23 12/06/2017    CREATININE 0.95 12/06/2017    GFRAA >60 12/06/2017    LABGLOM 58 12/06/2017    GLUCOSE 121 12/06/2017    GLUCOSE 94 04/30/2012    PROT 5.7 06/08/2017    LABALBU 3.0 06/08/2017    LABALBU 4.3 03/20/2012    CALCIUM 8.6 12/06/2017    BILITOT 0.60 06/08/2017    ALKPHOS 64 06/08/2017    AST 12 06/08/2017    ALT 14 11/09/2017     Current Inpatient Medications  Current Facility-Administered Medications: acetaminophen (TYLENOL) tablet 650 mg, 650 mg, Oral, Q4H PRN  0.9 % sodium chloride infusion, , Intravenous, Continuous  lisinopril-hydrochlorothiazide (PRINZIDE;ZESTORETIC) 20-12.5 MG per tablet 2 tablet, 2 tablet, Oral, Daily  allopurinol (ZYLOPRIM) tablet 300 mg, 300 mg, Oral, Daily  pravastatin (PRAVACHOL) tablet 40 mg, 40 mg,

## 2017-12-22 NOTE — PROGRESS NOTES
This is a 81 y.o. female with history of distal ICA aneurysm. S/p PED repair June of 2017 and found to have a residual aneurysm on angio earlier this month who came in for elective coiling of aneurysm. PED repair performed on 12/21. Patient doing well post-op, with no new complaints. Endovascular will continue to follow outpt and is ok with discharge home at this time. Pt stable for discharge home.     Bismark Lazcano DO PGY2  Emergency Medicine Resident Physician  Neuro Critical Care Service   12/22/2017  8:37 AM

## 2018-01-05 ENCOUNTER — HOSPITAL ENCOUNTER (OUTPATIENT)
Dept: CT IMAGING | Age: 73
Discharge: HOME | End: 2018-01-05
Payer: MEDICARE

## 2018-01-05 DIAGNOSIS — I67.1 ANEURYSM OF LEFT INTERNAL CAROTID ARTERY: ICD-10-CM

## 2018-01-05 PROCEDURE — 70498 CT ANGIOGRAPHY NECK: CPT

## 2018-01-05 PROCEDURE — 70496 CT ANGIOGRAPHY HEAD: CPT

## 2018-01-05 PROCEDURE — 6360000004 HC RX CONTRAST MEDICATION: Performed by: PSYCHIATRY & NEUROLOGY

## 2018-01-05 PROCEDURE — 2580000003 HC RX 258: Performed by: PSYCHIATRY & NEUROLOGY

## 2018-01-05 RX ORDER — 0.9 % SODIUM CHLORIDE 0.9 %
100 INTRAVENOUS SOLUTION INTRAVENOUS ONCE
Status: COMPLETED | OUTPATIENT
Start: 2018-01-05 | End: 2018-01-05

## 2018-01-05 RX ORDER — SODIUM CHLORIDE 0.9 % (FLUSH) 0.9 %
10 SYRINGE (ML) INJECTION PRN
Status: DISCONTINUED | OUTPATIENT
Start: 2018-01-05 | End: 2018-01-08 | Stop reason: HOSPADM

## 2018-01-05 RX ADMIN — IOPAMIDOL 100 ML: 755 INJECTION, SOLUTION INTRAVENOUS at 09:30

## 2018-01-05 RX ADMIN — SODIUM CHLORIDE 100 ML: 9 INJECTION, SOLUTION INTRAVENOUS at 09:30

## 2018-01-05 RX ADMIN — Medication 10 ML: at 09:30

## 2018-01-19 ENCOUNTER — OFFICE VISIT (OUTPATIENT)
Dept: NEUROLOGY | Age: 73
End: 2018-01-19
Payer: MEDICARE

## 2018-01-19 VITALS
WEIGHT: 288.2 LBS | SYSTOLIC BLOOD PRESSURE: 145 MMHG | BODY MASS INDEX: 43.68 KG/M2 | HEIGHT: 68 IN | HEART RATE: 91 BPM | DIASTOLIC BLOOD PRESSURE: 78 MMHG

## 2018-01-19 DIAGNOSIS — I67.1 CEREBRAL ANEURYSM: Primary | ICD-10-CM

## 2018-01-19 PROCEDURE — 4040F PNEUMOC VAC/ADMIN/RCVD: CPT | Performed by: PSYCHIATRY & NEUROLOGY

## 2018-01-19 PROCEDURE — 99214 OFFICE O/P EST MOD 30 MIN: CPT | Performed by: PSYCHIATRY & NEUROLOGY

## 2018-01-19 PROCEDURE — 3017F COLORECTAL CA SCREEN DOC REV: CPT | Performed by: PSYCHIATRY & NEUROLOGY

## 2018-01-19 PROCEDURE — 3014F SCREEN MAMMO DOC REV: CPT | Performed by: PSYCHIATRY & NEUROLOGY

## 2018-01-19 PROCEDURE — G8484 FLU IMMUNIZE NO ADMIN: HCPCS | Performed by: PSYCHIATRY & NEUROLOGY

## 2018-01-19 PROCEDURE — G8417 CALC BMI ABV UP PARAM F/U: HCPCS | Performed by: PSYCHIATRY & NEUROLOGY

## 2018-01-19 PROCEDURE — G8400 PT W/DXA NO RESULTS DOC: HCPCS | Performed by: PSYCHIATRY & NEUROLOGY

## 2018-01-19 PROCEDURE — 1090F PRES/ABSN URINE INCON ASSESS: CPT | Performed by: PSYCHIATRY & NEUROLOGY

## 2018-01-19 PROCEDURE — G8427 DOCREV CUR MEDS BY ELIG CLIN: HCPCS | Performed by: PSYCHIATRY & NEUROLOGY

## 2018-01-19 PROCEDURE — 1111F DSCHRG MED/CURRENT MED MERGE: CPT | Performed by: PSYCHIATRY & NEUROLOGY

## 2018-01-19 PROCEDURE — 1123F ACP DISCUSS/DSCN MKR DOCD: CPT | Performed by: PSYCHIATRY & NEUROLOGY

## 2018-01-19 PROCEDURE — 1036F TOBACCO NON-USER: CPT | Performed by: PSYCHIATRY & NEUROLOGY

## 2018-01-19 RX ORDER — CLOPIDOGREL BISULFATE 75 MG/1
75 TABLET ORAL DAILY
Qty: 30 TABLET | Refills: 3 | Status: SHIPPED | OUTPATIENT
Start: 2018-01-19 | End: 2018-06-13 | Stop reason: ALTCHOICE

## 2018-03-05 ENCOUNTER — OFFICE VISIT (OUTPATIENT)
Dept: NEUROLOGY | Age: 73
End: 2018-03-05
Payer: MEDICARE

## 2018-03-05 VITALS
WEIGHT: 290 LBS | BODY MASS INDEX: 43.95 KG/M2 | HEART RATE: 89 BPM | DIASTOLIC BLOOD PRESSURE: 84 MMHG | SYSTOLIC BLOOD PRESSURE: 150 MMHG | HEIGHT: 68 IN

## 2018-03-05 DIAGNOSIS — I72.9 ANEURYSM (HCC): Primary | ICD-10-CM

## 2018-03-05 PROCEDURE — 99212 OFFICE O/P EST SF 10 MIN: CPT | Performed by: NEUROLOGICAL SURGERY

## 2018-03-06 RX ORDER — CLOPIDOGREL BISULFATE 75 MG/1
TABLET ORAL
Qty: 30 TABLET | Refills: 5 | Status: SHIPPED | OUTPATIENT
Start: 2018-03-06 | End: 2018-07-01

## 2018-05-15 ENCOUNTER — HOSPITAL ENCOUNTER (OUTPATIENT)
Age: 73
Discharge: HOME | End: 2018-05-15
Payer: MEDICARE

## 2018-05-15 LAB
ALBUMIN SERPL-MCNC: 4.1 G/DL (ref 3.5–5.2)
ALBUMIN/GLOBULIN RATIO: ABNORMAL (ref 1–2.5)
ALP BLD-CCNC: 81 U/L (ref 35–104)
ALT SERPL-CCNC: 14 U/L (ref 5–33)
ANION GAP SERPL CALCULATED.3IONS-SCNC: 14 MMOL/L (ref 9–17)
AST SERPL-CCNC: 16 U/L
BILIRUB SERPL-MCNC: 0.44 MG/DL (ref 0.3–1.2)
BUN BLDV-MCNC: 31 MG/DL (ref 8–23)
BUN/CREAT BLD: ABNORMAL (ref 9–20)
CA 27-29: 19 U/ML (ref 0–38)
CALCIUM SERPL-MCNC: 9.4 MG/DL (ref 8.6–10.4)
CHLORIDE BLD-SCNC: 105 MMOL/L (ref 98–107)
CO2: 24 MMOL/L (ref 20–31)
CREAT SERPL-MCNC: 1.02 MG/DL (ref 0.5–0.9)
GFR AFRICAN AMERICAN: >60 ML/MIN
GFR NON-AFRICAN AMERICAN: 54 ML/MIN
GFR SERPL CREATININE-BSD FRML MDRD: ABNORMAL ML/MIN/{1.73_M2}
GFR SERPL CREATININE-BSD FRML MDRD: ABNORMAL ML/MIN/{1.73_M2}
GLUCOSE BLD-MCNC: 122 MG/DL (ref 70–99)
LACTATE DEHYDROGENASE: 147 U/L (ref 135–214)
POTASSIUM SERPL-SCNC: 4.6 MMOL/L (ref 3.7–5.3)
SODIUM BLD-SCNC: 143 MMOL/L (ref 135–144)
TOTAL PROTEIN: 7.3 G/DL (ref 6.4–8.3)

## 2018-05-15 PROCEDURE — 86300 IMMUNOASSAY TUMOR CA 15-3: CPT

## 2018-05-15 PROCEDURE — 80053 COMPREHEN METABOLIC PANEL: CPT

## 2018-05-15 PROCEDURE — 36415 COLL VENOUS BLD VENIPUNCTURE: CPT

## 2018-05-15 PROCEDURE — 83615 LACTATE (LD) (LDH) ENZYME: CPT

## 2018-05-29 ENCOUNTER — OFFICE VISIT (OUTPATIENT)
Dept: UROLOGY | Age: 73
End: 2018-05-29
Payer: MEDICARE

## 2018-05-29 VITALS
DIASTOLIC BLOOD PRESSURE: 74 MMHG | TEMPERATURE: 98.3 F | WEIGHT: 289 LBS | HEART RATE: 88 BPM | SYSTOLIC BLOOD PRESSURE: 134 MMHG | HEIGHT: 68 IN | BODY MASS INDEX: 43.8 KG/M2

## 2018-05-29 DIAGNOSIS — R35.0 URINARY FREQUENCY: ICD-10-CM

## 2018-05-29 DIAGNOSIS — R30.0 DYSURIA: Primary | ICD-10-CM

## 2018-05-29 DIAGNOSIS — R31.0 GROSS HEMATURIA: ICD-10-CM

## 2018-05-29 LAB
BILIRUBIN, POC: ABNORMAL
BLOOD URINE, POC: ABNORMAL
CLARITY, POC: CLEAR
COLOR, POC: YELLOW
GLUCOSE URINE, POC: ABNORMAL
KETONES, POC: ABNORMAL
LEUKOCYTE EST, POC: ABNORMAL
NITRITE, POC: ABNORMAL
PH, POC: ABNORMAL
PROTEIN, POC: ABNORMAL
SPECIFIC GRAVITY, POC: ABNORMAL
UROBILINOGEN, POC: ABNORMAL

## 2018-05-29 PROCEDURE — 3017F COLORECTAL CA SCREEN DOC REV: CPT | Performed by: UROLOGY

## 2018-05-29 PROCEDURE — 51798 US URINE CAPACITY MEASURE: CPT | Performed by: UROLOGY

## 2018-05-29 PROCEDURE — G8417 CALC BMI ABV UP PARAM F/U: HCPCS | Performed by: UROLOGY

## 2018-05-29 PROCEDURE — 81003 URINALYSIS AUTO W/O SCOPE: CPT | Performed by: UROLOGY

## 2018-05-29 PROCEDURE — 99204 OFFICE O/P NEW MOD 45 MIN: CPT | Performed by: UROLOGY

## 2018-05-29 PROCEDURE — 1036F TOBACCO NON-USER: CPT | Performed by: UROLOGY

## 2018-05-29 PROCEDURE — 1123F ACP DISCUSS/DSCN MKR DOCD: CPT | Performed by: UROLOGY

## 2018-05-29 PROCEDURE — G8428 CUR MEDS NOT DOCUMENT: HCPCS | Performed by: UROLOGY

## 2018-05-29 PROCEDURE — 4040F PNEUMOC VAC/ADMIN/RCVD: CPT | Performed by: UROLOGY

## 2018-05-29 PROCEDURE — G8400 PT W/DXA NO RESULTS DOC: HCPCS | Performed by: UROLOGY

## 2018-05-29 PROCEDURE — 1090F PRES/ABSN URINE INCON ASSESS: CPT | Performed by: UROLOGY

## 2018-05-29 ASSESSMENT — ENCOUNTER SYMPTOMS
COUGH: 0
NAUSEA: 0
EYE REDNESS: 0
BACK PAIN: 1
VOMITING: 0
SHORTNESS OF BREATH: 0
COLOR CHANGE: 0
EYE PAIN: 0
ABDOMINAL PAIN: 0
WHEEZING: 0

## 2018-06-06 ENCOUNTER — HOSPITAL ENCOUNTER (OUTPATIENT)
Age: 73
Discharge: HOME | End: 2018-06-06
Payer: MEDICARE

## 2018-06-06 ENCOUNTER — HOSPITAL ENCOUNTER (OUTPATIENT)
Dept: CT IMAGING | Age: 73
Discharge: HOME | End: 2018-06-08
Payer: MEDICARE

## 2018-06-06 DIAGNOSIS — R31.0 GROSS HEMATURIA: ICD-10-CM

## 2018-06-06 LAB
ANION GAP SERPL CALCULATED.3IONS-SCNC: 12 MMOL/L (ref 9–17)
BUN BLDV-MCNC: 25 MG/DL (ref 8–23)
BUN/CREAT BLD: ABNORMAL (ref 9–20)
CALCIUM SERPL-MCNC: 9.3 MG/DL (ref 8.6–10.4)
CHLORIDE BLD-SCNC: 102 MMOL/L (ref 98–107)
CO2: 27 MMOL/L (ref 20–31)
CREAT SERPL-MCNC: 1.04 MG/DL (ref 0.5–0.9)
GFR AFRICAN AMERICAN: >60 ML/MIN
GFR NON-AFRICAN AMERICAN: 52 ML/MIN
GFR SERPL CREATININE-BSD FRML MDRD: ABNORMAL ML/MIN/{1.73_M2}
GFR SERPL CREATININE-BSD FRML MDRD: ABNORMAL ML/MIN/{1.73_M2}
GLUCOSE BLD-MCNC: 125 MG/DL (ref 70–99)
POTASSIUM SERPL-SCNC: 4.7 MMOL/L (ref 3.7–5.3)
SODIUM BLD-SCNC: 141 MMOL/L (ref 135–144)

## 2018-06-06 PROCEDURE — 6360000004 HC RX CONTRAST MEDICATION: Performed by: UROLOGY

## 2018-06-06 PROCEDURE — 74178 CT ABD&PLV WO CNTR FLWD CNTR: CPT

## 2018-06-06 PROCEDURE — 36415 COLL VENOUS BLD VENIPUNCTURE: CPT

## 2018-06-06 PROCEDURE — 80048 BASIC METABOLIC PNL TOTAL CA: CPT

## 2018-06-06 PROCEDURE — 2580000003 HC RX 258: Performed by: UROLOGY

## 2018-06-06 RX ORDER — SODIUM CHLORIDE 0.9 % (FLUSH) 0.9 %
10 SYRINGE (ML) INJECTION PRN
Status: DISCONTINUED | OUTPATIENT
Start: 2018-06-06 | End: 2018-06-09 | Stop reason: HOSPADM

## 2018-06-06 RX ORDER — 0.9 % SODIUM CHLORIDE 0.9 %
80 INTRAVENOUS SOLUTION INTRAVENOUS ONCE
Status: COMPLETED | OUTPATIENT
Start: 2018-06-06 | End: 2018-06-06

## 2018-06-06 RX ADMIN — IOPAMIDOL 120 ML: 755 INJECTION, SOLUTION INTRAVENOUS at 08:43

## 2018-06-06 RX ADMIN — Medication 10 ML: at 08:43

## 2018-06-06 RX ADMIN — SODIUM CHLORIDE 80 ML: 9 INJECTION, SOLUTION INTRAVENOUS at 08:43

## 2018-06-13 ENCOUNTER — HOSPITAL ENCOUNTER (OUTPATIENT)
Dept: INTERVENTIONAL RADIOLOGY/VASCULAR | Age: 73
Discharge: HOME | End: 2018-06-15
Payer: MEDICARE

## 2018-06-13 VITALS
HEIGHT: 67 IN | SYSTOLIC BLOOD PRESSURE: 142 MMHG | DIASTOLIC BLOOD PRESSURE: 73 MMHG | BODY MASS INDEX: 43.95 KG/M2 | HEART RATE: 76 BPM | WEIGHT: 280 LBS | OXYGEN SATURATION: 98 % | TEMPERATURE: 97.7 F | RESPIRATION RATE: 19 BRPM

## 2018-06-13 DIAGNOSIS — I72.9 ANEURYSM OF ARTERY (HCC): ICD-10-CM

## 2018-06-13 LAB
HCT VFR BLD CALC: 37.3 % (ref 36.3–47.1)
HEMOGLOBIN: 11.6 G/DL (ref 11.9–15.1)
MCH RBC QN AUTO: 30.7 PG (ref 25.2–33.5)
MCHC RBC AUTO-ENTMCNC: 31.1 G/DL (ref 28.4–34.8)
MCV RBC AUTO: 98.7 FL (ref 82.6–102.9)
NRBC AUTOMATED: 0 PER 100 WBC
PDW BLD-RTO: 14.2 % (ref 11.8–14.4)
PLATELET # BLD: 153 K/UL (ref 138–453)
PMV BLD AUTO: 10.7 FL (ref 8.1–13.5)
RBC # BLD: 3.78 M/UL (ref 3.95–5.11)
WBC # BLD: 6.4 K/UL (ref 3.5–11.3)

## 2018-06-13 PROCEDURE — 36224 PLACE CATH CAROTD ART: CPT | Performed by: PSYCHIATRY & NEUROLOGY

## 2018-06-13 PROCEDURE — 7100000010 HC PHASE II RECOVERY - FIRST 15 MIN

## 2018-06-13 PROCEDURE — 6360000002 HC RX W HCPCS: Performed by: PSYCHIATRY & NEUROLOGY

## 2018-06-13 PROCEDURE — C1769 GUIDE WIRE: HCPCS

## 2018-06-13 PROCEDURE — C1887 CATHETER, GUIDING: HCPCS

## 2018-06-13 PROCEDURE — C1894 INTRO/SHEATH, NON-LASER: HCPCS

## 2018-06-13 PROCEDURE — 2580000003 HC RX 258: Performed by: PSYCHIATRY & NEUROLOGY

## 2018-06-13 PROCEDURE — 7100000011 HC PHASE II RECOVERY - ADDTL 15 MIN

## 2018-06-13 PROCEDURE — 99215 OFFICE O/P EST HI 40 MIN: CPT | Performed by: PSYCHIATRY & NEUROLOGY

## 2018-06-13 PROCEDURE — 85027 COMPLETE CBC AUTOMATED: CPT

## 2018-06-13 PROCEDURE — 6360000004 HC RX CONTRAST MEDICATION: Performed by: PSYCHIATRY & NEUROLOGY

## 2018-06-13 RX ORDER — SODIUM CHLORIDE 9 MG/ML
INJECTION, SOLUTION INTRAVENOUS CONTINUOUS
Status: DISCONTINUED | OUTPATIENT
Start: 2018-06-13 | End: 2018-06-16 | Stop reason: HOSPADM

## 2018-06-13 RX ORDER — MIDAZOLAM HYDROCHLORIDE 1 MG/ML
INJECTION INTRAMUSCULAR; INTRAVENOUS
Status: COMPLETED | OUTPATIENT
Start: 2018-06-13 | End: 2018-06-13

## 2018-06-13 RX ORDER — IODIXANOL 270 MG/ML
52 INJECTION, SOLUTION INTRAVASCULAR
Status: COMPLETED | OUTPATIENT
Start: 2018-06-13 | End: 2018-06-13

## 2018-06-13 RX ADMIN — Medication 2 G: at 12:05

## 2018-06-13 RX ADMIN — SODIUM CHLORIDE: 9 INJECTION, SOLUTION INTRAVENOUS at 10:26

## 2018-06-13 RX ADMIN — MIDAZOLAM HYDROCHLORIDE 0.5 MG: 1 INJECTION, SOLUTION INTRAMUSCULAR; INTRAVENOUS at 11:16

## 2018-06-13 RX ADMIN — IODIXANOL 52 ML: 270 INJECTION, SOLUTION INTRAVASCULAR at 12:11

## 2018-06-13 ASSESSMENT — PULMONARY FUNCTION TESTS
PIF_VALUE: 0
PIF_VALUE: 0

## 2018-06-28 ENCOUNTER — TELEPHONE (OUTPATIENT)
Dept: UROLOGY | Age: 73
End: 2018-06-28

## 2018-06-28 ENCOUNTER — HOSPITAL ENCOUNTER (EMERGENCY)
Age: 73
Discharge: HOME | End: 2018-06-28
Attending: EMERGENCY MEDICINE
Payer: MEDICARE

## 2018-06-28 VITALS
HEART RATE: 87 BPM | BODY MASS INDEX: 43.85 KG/M2 | TEMPERATURE: 98 F | OXYGEN SATURATION: 99 % | SYSTOLIC BLOOD PRESSURE: 148 MMHG | WEIGHT: 280 LBS | DIASTOLIC BLOOD PRESSURE: 72 MMHG | RESPIRATION RATE: 22 BRPM

## 2018-06-28 DIAGNOSIS — R33.9 URINARY RETENTION: Primary | ICD-10-CM

## 2018-06-28 DIAGNOSIS — N30.00 ACUTE CYSTITIS WITHOUT HEMATURIA: ICD-10-CM

## 2018-06-28 LAB
-: ABNORMAL
AMORPHOUS: ABNORMAL
BACTERIA: ABNORMAL
BILIRUBIN URINE: NEGATIVE
CASTS UA: ABNORMAL /LPF
COLOR: YELLOW
COMMENT UA: ABNORMAL
CRYSTALS, UA: ABNORMAL /HPF
EPITHELIAL CELLS UA: ABNORMAL /HPF
GLUCOSE URINE: NEGATIVE
KETONES, URINE: NEGATIVE
LEUKOCYTE ESTERASE, URINE: ABNORMAL
MUCUS: ABNORMAL
NITRITE, URINE: NEGATIVE
OTHER OBSERVATIONS UA: ABNORMAL
PH UA: 5.5 (ref 5–8)
PROTEIN UA: NEGATIVE
RBC UA: ABNORMAL /HPF
RENAL EPITHELIAL, UA: ABNORMAL /HPF
SPECIFIC GRAVITY UA: 1.01 (ref 1–1.03)
TRICHOMONAS: ABNORMAL
TURBIDITY: CLEAR
URINE HGB: NEGATIVE
UROBILINOGEN, URINE: NORMAL
WBC UA: ABNORMAL /HPF
YEAST: ABNORMAL

## 2018-06-28 PROCEDURE — 99283 EMERGENCY DEPT VISIT LOW MDM: CPT

## 2018-06-28 PROCEDURE — 86403 PARTICLE AGGLUT ANTBDY SCRN: CPT

## 2018-06-28 PROCEDURE — 81001 URINALYSIS AUTO W/SCOPE: CPT

## 2018-06-28 PROCEDURE — 87086 URINE CULTURE/COLONY COUNT: CPT

## 2018-06-28 PROCEDURE — 51702 INSERT TEMP BLADDER CATH: CPT

## 2018-06-28 RX ORDER — CIPROFLOXACIN 500 MG/1
500 TABLET, FILM COATED ORAL 2 TIMES DAILY
Qty: 14 TABLET | Refills: 0 | Status: SHIPPED | OUTPATIENT
Start: 2018-06-28 | End: 2018-07-05

## 2018-06-28 ASSESSMENT — ENCOUNTER SYMPTOMS
CONSTIPATION: 0
FACIAL SWELLING: 0
VOMITING: 0
DIARRHEA: 0
EYE DISCHARGE: 0
WHEEZING: 0
SINUS PRESSURE: 0
EYE REDNESS: 0
ABDOMINAL PAIN: 0
COUGH: 0
SHORTNESS OF BREATH: 0
BLOOD IN STOOL: 0
RHINORRHEA: 0
TROUBLE SWALLOWING: 0
CHEST TIGHTNESS: 0
EYE PAIN: 0
BACK PAIN: 0
SORE THROAT: 0
NAUSEA: 0
COLOR CHANGE: 0

## 2018-06-28 ASSESSMENT — PAIN DESCRIPTION - LOCATION: LOCATION: OTHER (COMMENT)

## 2018-06-28 ASSESSMENT — PAIN DESCRIPTION - PAIN TYPE: TYPE: ACUTE PAIN

## 2018-06-28 ASSESSMENT — PAIN SCALES - GENERAL
PAINLEVEL_OUTOF10: 8
PAINLEVEL_OUTOF10: 2

## 2018-06-29 ENCOUNTER — TELEPHONE (OUTPATIENT)
Dept: NEUROLOGY | Age: 73
End: 2018-06-29

## 2018-06-29 LAB
CULTURE: ABNORMAL
Lab: ABNORMAL
SPECIMEN DESCRIPTION: ABNORMAL
STATUS: ABNORMAL

## 2018-07-01 ENCOUNTER — HOSPITAL ENCOUNTER (EMERGENCY)
Age: 73
Discharge: HOME | End: 2018-07-01
Attending: EMERGENCY MEDICINE
Payer: MEDICARE

## 2018-07-01 VITALS
BODY MASS INDEX: 43.95 KG/M2 | DIASTOLIC BLOOD PRESSURE: 87 MMHG | SYSTOLIC BLOOD PRESSURE: 109 MMHG | TEMPERATURE: 97.8 F | HEART RATE: 75 BPM | HEIGHT: 67 IN | RESPIRATION RATE: 18 BRPM | WEIGHT: 280 LBS | OXYGEN SATURATION: 97 %

## 2018-07-01 DIAGNOSIS — N32.89 BLADDER SPASMS: Primary | ICD-10-CM

## 2018-07-01 LAB
-: ABNORMAL
AMORPHOUS: ABNORMAL
BACTERIA: ABNORMAL
BILIRUBIN URINE: NEGATIVE
CASTS UA: ABNORMAL /LPF
COLOR: YELLOW
COMMENT UA: ABNORMAL
CRYSTALS, UA: ABNORMAL /HPF
EPITHELIAL CELLS UA: ABNORMAL /HPF
GLUCOSE URINE: NEGATIVE
KETONES, URINE: NEGATIVE
LEUKOCYTE ESTERASE, URINE: ABNORMAL
MUCUS: ABNORMAL
NITRITE, URINE: NEGATIVE
OTHER OBSERVATIONS UA: ABNORMAL
PH UA: 5.5 (ref 5–8)
PROTEIN UA: ABNORMAL
RBC UA: ABNORMAL /HPF
RENAL EPITHELIAL, UA: ABNORMAL /HPF
SPECIFIC GRAVITY UA: 1.02 (ref 1–1.03)
TRICHOMONAS: ABNORMAL
TURBIDITY: ABNORMAL
URINE HGB: ABNORMAL
UROBILINOGEN, URINE: NORMAL
WBC UA: ABNORMAL /HPF
YEAST: ABNORMAL

## 2018-07-01 PROCEDURE — 81001 URINALYSIS AUTO W/SCOPE: CPT

## 2018-07-01 PROCEDURE — 87086 URINE CULTURE/COLONY COUNT: CPT

## 2018-07-01 PROCEDURE — 99283 EMERGENCY DEPT VISIT LOW MDM: CPT

## 2018-07-01 RX ORDER — PHENAZOPYRIDINE HYDROCHLORIDE 200 MG/1
200 TABLET, FILM COATED ORAL 3 TIMES DAILY PRN
Qty: 6 TABLET | Refills: 0 | Status: SHIPPED | OUTPATIENT
Start: 2018-07-01 | End: 2018-07-04

## 2018-07-01 ASSESSMENT — ENCOUNTER SYMPTOMS
VOMITING: 0
NAUSEA: 0
WHEEZING: 0
CONSTIPATION: 0
DIARRHEA: 0
ABDOMINAL PAIN: 0
EYE DISCHARGE: 0
SHORTNESS OF BREATH: 0
SORE THROAT: 0
BLURRED VISION: 0
COUGH: 0

## 2018-07-01 NOTE — ED PROVIDER NOTES
[address]  eMERGENCY dEPARTMENT eNCOUnter    Pt Name: Margarito Denton  MRN: [id number]  YOB: 1945  Date of evaluation: 7/1/18  PCP: Diony Schilling       Chief Complaint   Patient presents with    Hematuria     also leaking around darnell cath       [facility]    Margarito Denton is a 81 y.o. female who presents With a chief complaint of hematuria. Patient was seen 3 days prior at this facility. She had urinary retention at that time and a Darnell catheter was placed. She is supposed to follow up with Dr. Mary Corrales. She states that she started having hematuria into her Darnell bag starting yesterday. She reports bladder spasms and some pain with urination as well. No actual abdominal pain. No nausea or vomiting. No changes in bowel or bladder habits. Symptoms are acute. Rates symptoms as 2 out of 10 in severity. Nothing makes her symptoms better or worse. No fevers or chills at home. She has no other complaints at this time. REVIEW OF SYSTEMS       Review of Systems   Constitutional: Negative for chills and fever. HENT: Negative for congestion and sore throat. Eyes: Negative for blurred vision and discharge. Respiratory: Negative for cough, shortness of breath and wheezing. Cardiovascular: Negative for chest pain, palpitations and leg swelling. Gastrointestinal: Negative for abdominal pain, constipation, diarrhea, nausea and vomiting. Genitourinary: Positive for dysuria and hematuria. Negative for flank pain. Musculoskeletal: Negative for joint pain and neck pain. Skin: Negative for rash. Neurological: Negative for dizziness and headaches. Psychiatric/Behavioral: Negative for depression. Negative in 10 essential Systems except as mentioned above and in the HPI. PAST MEDICAL HISTORY    has a past medical history of Aneurysm, cerebral; Arthritis; Breast cancer ([facility] Diabetes mellitus ([facility] H/O transfusion;  Hyperlipidemia; occasionally words are mis-transcribed.)    Benita Osorio DO  Attending Emergency Physician          Benita Osorio DO  07/01/18 0127

## 2018-07-02 ENCOUNTER — TELEPHONE (OUTPATIENT)
Dept: UROLOGY | Age: 73
End: 2018-07-02

## 2018-07-02 LAB
CULTURE: NO GROWTH
Lab: NORMAL
SPECIMEN DESCRIPTION: NORMAL
STATUS: NORMAL

## 2018-07-02 NOTE — TELEPHONE ENCOUNTER
Patient called to relay she went back to ER because of pain. They inserted a larger catheter she is feeling a little better but still has pain and bleeding around catheter. Advised I would relay message to physician in office today and return call to her.

## 2018-07-03 ENCOUNTER — PROCEDURE VISIT (OUTPATIENT)
Dept: UROLOGY | Age: 73
End: 2018-07-03
Payer: MEDICARE

## 2018-07-03 VITALS
HEART RATE: 74 BPM | TEMPERATURE: 98 F | HEIGHT: 68 IN | DIASTOLIC BLOOD PRESSURE: 71 MMHG | WEIGHT: 280 LBS | SYSTOLIC BLOOD PRESSURE: 119 MMHG | BODY MASS INDEX: 42.44 KG/M2

## 2018-07-03 DIAGNOSIS — R33.9 RETENTION OF URINE: Primary | ICD-10-CM

## 2018-07-03 PROCEDURE — 51700 IRRIGATION OF BLADDER: CPT | Performed by: UROLOGY

## 2018-07-03 PROCEDURE — 99999 PR OFFICE/OUTPT VISIT,PROCEDURE ONLY: CPT | Performed by: UROLOGY

## 2018-07-03 NOTE — PROGRESS NOTES
Fill and pull procedure  Risks and Benefits discussed with patient prior to procedure. Procedure Date: 7/3/18    Verbal consent obtained from patient prior to procedure. Patient arrived with old urethral catheter in place for fill and pull. Bladder installation of 135 ml of sterile water per gravity until patient felt the sensation of bladder capacity fullness. Darnell ballon deflated and darnell removed without complication. Attempted to void with return of 150ml . Patient tolerated procedure well and without complications. Verbalized understanding.

## 2018-07-17 ENCOUNTER — PROCEDURE VISIT (OUTPATIENT)
Dept: UROLOGY | Age: 73
End: 2018-07-17
Payer: MEDICARE

## 2018-07-17 VITALS — SYSTOLIC BLOOD PRESSURE: 112 MMHG | DIASTOLIC BLOOD PRESSURE: 74 MMHG | TEMPERATURE: 97.9 F | HEART RATE: 68 BPM

## 2018-07-17 DIAGNOSIS — N36.1 URETHRAL DIVERTICULUM: ICD-10-CM

## 2018-07-17 DIAGNOSIS — R31.0 GROSS HEMATURIA: Primary | ICD-10-CM

## 2018-07-17 PROCEDURE — 99999 PR OFFICE/OUTPT VISIT,PROCEDURE ONLY: CPT | Performed by: UROLOGY

## 2018-07-17 PROCEDURE — 52000 CYSTOURETHROSCOPY: CPT | Performed by: UROLOGY

## 2018-07-17 NOTE — PROGRESS NOTES
Cystoscopy Operative Note (7/17/18): Surgeon: Wai Ba MD  Anesthesia: Urethral 2% Xylocaine  Indications: hematuria  Position: Dorsal Lithotomy    Findings:   Risks and Benefits discussed with patient prior to procedure. The patient was prepped and draped in the usual sterile fashion. The flexible cystoscope was advanced through the urethra and into the bladder. The bladder was thoroughly inspected and the following was noted:    Vagina: normal appearing vagina with normal color and discharge, no lesions  Residual Urine: none  Urethra: not indicated and urethral diverticulum  Bladder: No tumors or CIS noted. No bladder diverticulum. There was none trabeculation noted. Ureters: Clear efflux from both ureters. Orifices with normal configuration and location. The cystoscope was removed. The patient tolerated the procedure well.     MRI pelvis

## 2018-07-25 ENCOUNTER — HOSPITAL ENCOUNTER (OUTPATIENT)
Dept: MRI IMAGING | Age: 73
Discharge: HOME | End: 2018-07-27
Payer: MEDICARE

## 2018-07-25 DIAGNOSIS — R31.0 GROSS HEMATURIA: ICD-10-CM

## 2018-07-25 LAB
BUN BLDV-MCNC: 26 MG/DL (ref 8–23)
CREAT SERPL-MCNC: 1.35 MG/DL (ref 0.5–0.9)
GFR AFRICAN AMERICAN: 47 ML/MIN
GFR NON-AFRICAN AMERICAN: 39 ML/MIN
GFR SERPL CREATININE-BSD FRML MDRD: ABNORMAL ML/MIN/{1.73_M2}
GFR SERPL CREATININE-BSD FRML MDRD: ABNORMAL ML/MIN/{1.73_M2}

## 2018-07-25 PROCEDURE — 72197 MRI PELVIS W/O & W/DYE: CPT

## 2018-07-25 PROCEDURE — 84520 ASSAY OF UREA NITROGEN: CPT

## 2018-07-25 PROCEDURE — A9579 GAD-BASE MR CONTRAST NOS,1ML: HCPCS | Performed by: UROLOGY

## 2018-07-25 PROCEDURE — 82565 ASSAY OF CREATININE: CPT

## 2018-07-25 PROCEDURE — 6360000004 HC RX CONTRAST MEDICATION: Performed by: UROLOGY

## 2018-07-25 PROCEDURE — 36415 COLL VENOUS BLD VENIPUNCTURE: CPT

## 2018-07-25 RX ADMIN — GADOTERIDOL 20 ML: 279.3 INJECTION, SOLUTION INTRAVENOUS at 17:22

## 2018-08-23 ENCOUNTER — OFFICE VISIT (OUTPATIENT)
Dept: UROLOGY | Age: 73
End: 2018-08-23
Payer: MEDICARE

## 2018-08-23 VITALS
DIASTOLIC BLOOD PRESSURE: 86 MMHG | HEART RATE: 78 BPM | SYSTOLIC BLOOD PRESSURE: 153 MMHG | BODY MASS INDEX: 43.04 KG/M2 | WEIGHT: 284 LBS | HEIGHT: 68 IN | TEMPERATURE: 98.2 F

## 2018-08-23 DIAGNOSIS — N36.1 URETHRAL DIVERTICULUM: Primary | ICD-10-CM

## 2018-08-23 DIAGNOSIS — N39.0 RECURRENT UTI: ICD-10-CM

## 2018-08-23 DIAGNOSIS — N39.46 MIXED STRESS AND URGE URINARY INCONTINENCE: ICD-10-CM

## 2018-08-23 PROCEDURE — 1123F ACP DISCUSS/DSCN MKR DOCD: CPT | Performed by: UROLOGY

## 2018-08-23 PROCEDURE — 1090F PRES/ABSN URINE INCON ASSESS: CPT | Performed by: UROLOGY

## 2018-08-23 PROCEDURE — 4040F PNEUMOC VAC/ADMIN/RCVD: CPT | Performed by: UROLOGY

## 2018-08-23 PROCEDURE — G8400 PT W/DXA NO RESULTS DOC: HCPCS | Performed by: UROLOGY

## 2018-08-23 PROCEDURE — 3017F COLORECTAL CA SCREEN DOC REV: CPT | Performed by: UROLOGY

## 2018-08-23 PROCEDURE — 0509F URINE INCON PLAN DOCD: CPT | Performed by: UROLOGY

## 2018-08-23 PROCEDURE — G8417 CALC BMI ABV UP PARAM F/U: HCPCS | Performed by: UROLOGY

## 2018-08-23 PROCEDURE — G8427 DOCREV CUR MEDS BY ELIG CLIN: HCPCS | Performed by: UROLOGY

## 2018-08-23 PROCEDURE — 1101F PT FALLS ASSESS-DOCD LE1/YR: CPT | Performed by: UROLOGY

## 2018-08-23 PROCEDURE — 1036F TOBACCO NON-USER: CPT | Performed by: UROLOGY

## 2018-08-23 PROCEDURE — 99214 OFFICE O/P EST MOD 30 MIN: CPT | Performed by: UROLOGY

## 2018-08-23 ASSESSMENT — ENCOUNTER SYMPTOMS
SHORTNESS OF BREATH: 0
BACK PAIN: 0
NAUSEA: 0
EYE REDNESS: 0
VOMITING: 0
EYE PAIN: 0
COUGH: 0
COLOR CHANGE: 0
ABDOMINAL PAIN: 0
WHEEZING: 0

## 2018-08-23 NOTE — PROGRESS NOTES
Review of Systems   Constitutional: Negative for appetite change, chills and fever. Eyes: Negative for pain, redness and visual disturbance. Respiratory: Negative for cough, shortness of breath and wheezing. Cardiovascular: Negative for chest pain and leg swelling. Gastrointestinal: Negative for abdominal pain, nausea and vomiting. Genitourinary: Positive for frequency and urgency. Negative for difficulty urinating, dysuria, flank pain, hematuria and vaginal discharge. Musculoskeletal: Negative for back pain, joint swelling and myalgias. Skin: Negative for color change, rash and wound. Neurological: Negative for dizziness, tremors and numbness. Hematological: Negative for adenopathy. Does not bruise/bleed easily.

## 2018-08-23 NOTE — PROGRESS NOTES
[facility]  [facility]OLOGY SPECIALISTS - [place]  [address]  [address]  [address] [address]  Dept: [address] [facility] Urology Office Note - Established    Patient:  Paulette Guaman  YOB: 1945  Date: 8/23/2018    The patient is a 81 y.o. female who presents today for evaluation of the following problems:   Chief Complaint   Patient presents with    Follow-up     4 weeks MRI results , discuss surgery     Incontinence     leaks all the time,  3 super absorbancy Pads per day        HPI  Here for worsening stress and urge incontinence- using 3 PPD,  and Hx of UTI. She had a CT scan showing urethral diverticulum, here to review MRI. Summary of old records: N/A    Additional History: N/A    Procedures Today: N/A    Urinalysis today:  No results found for this visit on 08/23/18. Imaging Reviewed during this Office Visit:      CT abdomen October 20, 2009.       HISTORY:   ORDERING SYSTEM PROVIDED HISTORY: Gross hematuria   TECHNOLOGIST PROVIDED HISTORY:   Ordering Physician Provided Reason for Exam: -PT STATES BLOOD IN HER URINE   WITH PAIN WHILE URINATING OFF AND ON FOR 1 MONTH   Acuity: Chronic   Type of Exam: Ongoing       FINDINGS:   Lower Chest: Mild bibasilar atelectasis/scarring.  No pericardial or pleural   effusions.       Kidneys and Urinary Tract: Noncontrast images demonstrate no ureteral, renal   or urinary bladder calculus.  Postcontrast images demonstrate a small cyst   seen along the lateral cortex of the right kidney measuring 1.6 cm in   greatest axial dimension.  Additional larger cysts identified along the   inferior pole of the right kidney measuring 2.2 cm greatest axial dimension.    Additional subcentimeter hypodense lesions are seen within the left kidney,   too small for accurate characterization.  No enhancing renal or collecting   system mass is noted.  Ureters appear grossly unremarkable.  Urinary bladder   appears grossly unremarkable.     Organs: Liver, portal vein, common bile duct, gallbladder, pancreas and left   adrenal gland all appear unremarkable.  1.5 cm right adrenal gland adenoma. Splenic granulomas are noted.  Abdominal aorta demonstrates moderate   calcification without aneurysm.       GI/Bowel: Stomach appears grossly unremarkable.  Small bowel appears   nondilated.  Sigmoid diverticulosis is noted.  No acute colonic abnormality. Appendix is normal.       Pelvis: Uterus has been surgically removed.  No adnexal mass or cyst.  There   appears to be a urethral diverticulum noted.       Peritoneum/Retroperitoneum: No free air, free fluid or lymphadenopathy.       Bones/Soft Tissues: Abdominal wall demonstrates no acute findings.  Osseous   structures demonstrate degenerative changes.  Mild scoliosis of the   visualized thoracolumbar spine.           Impression   *No suspicious urinary tract abnormality is identified to explain the   patient's gross hematuria. *1.5 cm right adrenal gland adenoma. *Sigmoid diverticulosis. *Urethral diverticulum. EXAMINATION:   MRI OF THE PELVIS WITHOUT AND WITH CONTRAST, 7/25/2018 5:29 pm       TECHNIQUE:   Multiplanar multisequence MRI of the pelvis was performed without and with   the administration of intravenous contrast.       COMPARISON:   None.       HISTORY:   ORDERING SYSTEM PROVIDED HISTORY: Gross hematuria   TECHNOLOGIST PROVIDED HISTORY:   Reason for exam:->urethral diverticulum   Ordering Physician Provided Reason for Exam: urethral diverticulum   Additional signs and symptoms: frequent urination       FINDINGS:   There is a T1 hypointense T2 hyperintense lesion encircling the uterus   consistent with the known urethral diverticulum measuring approximately 3.0 x   3.0 by 2.5 cm.  The neck of the diverticulum is not visualized.  Demonstrates   homogeneous T2 hyperintensity with no mural lesion or abnormal enhancement. No MR findings to suggest infection or malignant degeneration. 12/21/2017    coil embolization     Family History   Problem Relation Age of Onset    Heart Disease Mother     Kidney Disease Mother         One Myrtice Care Other Father         DROWN    Heart Attack Brother     No Known Problems Maternal Grandmother     No Known Problems Maternal Grandfather     Diabetes Paternal Grandmother     No Known Problems Paternal Grandfather     Colon Cancer Brother     High Blood Pressure Son     Arthritis Son      Outpatient Prescriptions Marked as Taking for the 8/23/18 encounter (Office Visit) with Jonathan Koo MD   Medication Sig Dispense Refill    acetaminophen (TYLENOL) 500 MG tablet Take 1,000 mg by mouth every 6 hours as needed for Pain      aspirin 325 MG tablet Take 325 mg by mouth daily       pravastatin (PRAVACHOL) 40 MG tablet Take 40 mg by mouth nightly      lisinopril-hydrochlorothiazide (PRINZIDE;ZESTORETIC) 20-12.5 MG per tablet Take 2 tablets by mouth daily      allopurinol (ZYLOPRIM) 300 MG tablet Take 300 mg by mouth daily        (All medications reviewed and updated by provider since last office visit or hospitalization)   Dicloxacillin and Pcn [penicillins]  History   Smoking Status    Former Smoker    Packs/day: 1.00    Types: Cigarettes    Start date: 1963    Quit date: 9/26/2004   Smokeless Tobacco    Never Used      (If patient a smoker, smoking cessation counseling offered)     History   Alcohol Use No       REVIEW OF SYSTEMS:  Review of Systems      Physical Exam:      Vitals:    08/23/18 1114   BP: (!) 153/86   Pulse: 78   Temp:      Body mass index is 43.18 kg/m². Patient is a 81 y.o. female in no acute distress and alert and oriented to person, place and time. Physical Exam  Constitutional: Patient in no acute distress. Neuro: Alert and oriented to person, place and time.   Psych: Mood normal, affect normal  Skin: No rash noted  HEENT: Head: Normocephalic and atraumatic  Conjunctivae and EOM are normal. Pupils are equal, round  Nose:

## 2018-09-10 ENCOUNTER — OFFICE VISIT (OUTPATIENT)
Dept: UROLOGY | Age: 73
End: 2018-09-10
Payer: MEDICARE

## 2018-09-10 VITALS
SYSTOLIC BLOOD PRESSURE: 132 MMHG | WEIGHT: 285 LBS | HEART RATE: 68 BPM | DIASTOLIC BLOOD PRESSURE: 80 MMHG | BODY MASS INDEX: 40.8 KG/M2 | HEIGHT: 70 IN

## 2018-09-10 DIAGNOSIS — R35.1 NOCTURIA: ICD-10-CM

## 2018-09-10 DIAGNOSIS — N36.1 URETHRAL DIVERTICULUM: Primary | ICD-10-CM

## 2018-09-10 DIAGNOSIS — N39.46 MIXED STRESS AND URGE URINARY INCONTINENCE: ICD-10-CM

## 2018-09-10 PROCEDURE — 1036F TOBACCO NON-USER: CPT | Performed by: UROLOGY

## 2018-09-10 PROCEDURE — 99214 OFFICE O/P EST MOD 30 MIN: CPT | Performed by: UROLOGY

## 2018-09-10 PROCEDURE — G8417 CALC BMI ABV UP PARAM F/U: HCPCS | Performed by: UROLOGY

## 2018-09-10 PROCEDURE — G8427 DOCREV CUR MEDS BY ELIG CLIN: HCPCS | Performed by: UROLOGY

## 2018-09-10 PROCEDURE — 4040F PNEUMOC VAC/ADMIN/RCVD: CPT | Performed by: UROLOGY

## 2018-09-10 PROCEDURE — 1101F PT FALLS ASSESS-DOCD LE1/YR: CPT | Performed by: UROLOGY

## 2018-09-10 PROCEDURE — 1123F ACP DISCUSS/DSCN MKR DOCD: CPT | Performed by: UROLOGY

## 2018-09-10 PROCEDURE — 1090F PRES/ABSN URINE INCON ASSESS: CPT | Performed by: UROLOGY

## 2018-09-10 PROCEDURE — 0509F URINE INCON PLAN DOCD: CPT | Performed by: UROLOGY

## 2018-09-10 PROCEDURE — 3017F COLORECTAL CA SCREEN DOC REV: CPT | Performed by: UROLOGY

## 2018-09-10 PROCEDURE — G8400 PT W/DXA NO RESULTS DOC: HCPCS | Performed by: UROLOGY

## 2018-09-10 ASSESSMENT — ENCOUNTER SYMPTOMS
WHEEZING: 0
EYE PAIN: 0
SHORTNESS OF BREATH: 0
NAUSEA: 0
COUGH: 0
ABDOMINAL PAIN: 0
COLOR CHANGE: 0
EYE REDNESS: 0
BACK PAIN: 0

## 2018-09-10 NOTE — PROGRESS NOTES
[facility]  [facility] [address]  [address]  [address] [facility] [facility] [address]  Dept:  Anabelle Cole [facility] Urology Office Note - New Patient    Patient:  Kelley Mcclain  YOB: 1945  Date: 9/10/2018    The patient is a 81 y.o. female who presents today for evaluation of the following problems:   Chief Complaint   Patient presents with    Surgical Consult     Discuss surgery    referred by Keisha Casanova. HPI  Pt has urethral diverticulum. Here to discuss management. Has urge and stress incont. Wears 4-5ppd. Has rec uti's. (Patient's old records have been requested, reviewed and summarized in today's note.)    Summary of old records: N/A    Additional History: N/A    Procedures Today: N/A    Urinalysis today:  No results found for this visit on 09/10/18. AUA Symptom Score (9/10/2018):                                Last BUN and creatinine:  Lab Results   Component Value Date    BUN 26 (H) 07/25/2018     Lab Results   Component Value Date    CREATININE 1.35 (H) 07/25/2018       Additional Lab/Culture results: none    Imaging Reviewed during this Office Visit: none  (results were independently reviewed by physician and radiology report verified)    PAST MEDICAL, FAMILY AND SOCIAL HISTORY:  Past Medical History:   Diagnosis Date    Aneurysm, cerebral 05/22/2017    Arthritis     gout    Breast cancer ([address] 2011    left-lumpectomy followed by chemo and radiation    Diabetes mellitus ([address]     not any more, ACTOS discontinued     H/O transfusion 2011    2 Units    Hyperlipidemia     ON RX    Hypertension     ON RX    Porphyria ([address]     Wears glasses      Past Surgical History:   Procedure Laterality Date    BREAST LUMPECTOMY Left 2011    [address]    TOTAL    OTHER SURGICAL HISTORY  06/2017    CEREBRA EMBO COILING WITH STENT    OTHER SURGICAL HISTORY Left 12/21/2017    coil embolization     Family History   Problem Lymphatics: No cervical palpable lymphadenopathy. Bladder non-tender and not distended. Musculoskeletal: Normal gait and station        Assessment and Plan      1. Urethral diverticulum    2. Mixed stress and urge urinary incontinence    3. Nocturia            Plan: cysto to evaluate bladder; will need urethral diverticulectomy afterwards       Prescriptions Ordered:  No orders of the defined types were placed in this encounter. Orders Placed:  No orders of the defined types were placed in this encounter. Uzma Ellis MD    Agree with the ROS entered by the MA.

## 2018-10-09 ENCOUNTER — OFFICE VISIT (OUTPATIENT)
Dept: NEUROLOGY | Age: 73
End: 2018-10-09
Payer: MEDICARE

## 2018-10-09 VITALS
BODY MASS INDEX: 43.95 KG/M2 | DIASTOLIC BLOOD PRESSURE: 102 MMHG | SYSTOLIC BLOOD PRESSURE: 141 MMHG | WEIGHT: 290 LBS | HEIGHT: 68 IN

## 2018-10-09 DIAGNOSIS — I67.1 CEREBRAL ANEURYSM WITHOUT RUPTURE: Primary | ICD-10-CM

## 2018-10-09 PROCEDURE — 3017F COLORECTAL CA SCREEN DOC REV: CPT | Performed by: PSYCHIATRY & NEUROLOGY

## 2018-10-09 PROCEDURE — G8417 CALC BMI ABV UP PARAM F/U: HCPCS | Performed by: PSYCHIATRY & NEUROLOGY

## 2018-10-09 PROCEDURE — 1090F PRES/ABSN URINE INCON ASSESS: CPT | Performed by: PSYCHIATRY & NEUROLOGY

## 2018-10-09 PROCEDURE — 1123F ACP DISCUSS/DSCN MKR DOCD: CPT | Performed by: PSYCHIATRY & NEUROLOGY

## 2018-10-09 PROCEDURE — G8427 DOCREV CUR MEDS BY ELIG CLIN: HCPCS | Performed by: PSYCHIATRY & NEUROLOGY

## 2018-10-09 PROCEDURE — 4040F PNEUMOC VAC/ADMIN/RCVD: CPT | Performed by: PSYCHIATRY & NEUROLOGY

## 2018-10-09 PROCEDURE — 1036F TOBACCO NON-USER: CPT | Performed by: PSYCHIATRY & NEUROLOGY

## 2018-10-09 PROCEDURE — G8484 FLU IMMUNIZE NO ADMIN: HCPCS | Performed by: PSYCHIATRY & NEUROLOGY

## 2018-10-09 PROCEDURE — 99215 OFFICE O/P EST HI 40 MIN: CPT | Performed by: PSYCHIATRY & NEUROLOGY

## 2018-10-09 PROCEDURE — 1101F PT FALLS ASSESS-DOCD LE1/YR: CPT | Performed by: PSYCHIATRY & NEUROLOGY

## 2018-10-09 PROCEDURE — G8400 PT W/DXA NO RESULTS DOC: HCPCS | Performed by: PSYCHIATRY & NEUROLOGY

## 2018-10-09 ASSESSMENT — ENCOUNTER SYMPTOMS
EYES NEGATIVE: 1
GASTROINTESTINAL NEGATIVE: 1

## 2018-10-09 NOTE — PROGRESS NOTES
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      Social History Main Topics    Smoking status: Former Smoker     Packs/day: 1.00     Types: Cigarettes     Start date: 1963     Quit date: 9/26/2004    Smokeless tobacco: Never Used    Alcohol use No    Drug use: No    Sexual activity: Not on file     Other Topics Concern    Not on file     Social History Narrative    No narrative on file       OBJECTIVE:   Vitals: BP (!) 141/102 (Site: Right Upper Arm, Position: Sitting, Cuff Size: Large Adult)   Ht 5' 8\" (1.727 m)   Wt 290 lb (131.5 kg)   LMP 12/20/2011 (Approximate)   BMI 44.09 kg/m²   General appearance: Lying in bed, NAD  HEENT: Head: Normocephalic, no lesions, without obvious abnormality. Neck: no adenopathy, no carotid bruit, no JVD, supple, symmetrical, trachea midline and thyroid not enlarged, symmetric, no tenderness/mass/nodules  Lungs: clear to auscultation bilaterally  Heart: regular rate and rhythm, S1, S2 normal, no murmur, click, rub or gallop  Abdomen: soft, non-tender; nondistended, bowel sounds normal  Extremities: extremities normal, atraumatic, no cyanosis or edema  Groin: groin c/d/i  Neurologic: Mental status: Alert, oriented, thought content appropriate, Alert and oriented x 3,   Language/speech: intact language, attention, knowledge  CN: II-XII intact  MOTOR: 5/5 throughout with normal tone and bulk for age  SENSORY: LT and PP symmetrical and intact  COORDINATION: F to N and Gait intact for age    LABS:   No results for input(s): WBC, HGB, HCT, PLT, NA, K, CL, CO2, BUN, CREATININE, MG, PHOS, CALCIUM, PTT, INR, AST, ALT, BILITOT, BILIDIR, NITRU, COLORU, BACTERIA in the last 72 hours.     Invalid input(s): PT, WBCU, RBCU, LEUKOCYTESUA  No results for input(s): ALKPHOS, ALT, AST, BILITOT, BILIDIR, LABALBU, AMYLASE, LIPASE in the last 72 hours. RADIOLOGY:   Images were personally reviewed including:        IMPRESSIONS:   Mounika Rodriguez is a 81 y.o. female who presents with headache for 10 days and abnormal test for which she had a complete work up and found to have giant left ICA cavernous aneurysm. S/P PED device with coiling on June 7th, 2017 with good results on June 2018    Doing well     No active issues       does not have any pertinent problems on file. PLANS:   Pipeline stent assisted coil embolization on June 7, 2017  ASA   FU in 6 months with MRA with Narayan  Repeat Angio in June of 2019    Consultation Visit Time:  40 minutes  Patient given educational materials - see patient instructions. Discussed use, benefit, and side effects of prescribed medications. Personally reviewed imaging with patients and all questions answered. Pt voiced understanding. Patient agreed with treatment plan. Follow up as directed below. Greater than 50% of the time was for counseling and providing answer to the patient question. The findings and the plan discussed with the patient and all her questions were answered. Thank you very much for your referral, please do not hesitate to contact me with any questions.     Maria Elena Grimm MD Pager: [phone]  Stroke, [facility]  [address]   [address]

## 2018-10-15 ENCOUNTER — PROCEDURE VISIT (OUTPATIENT)
Dept: UROLOGY | Age: 73
End: 2018-10-15
Payer: MEDICARE

## 2018-10-15 VITALS
BODY MASS INDEX: 42.74 KG/M2 | SYSTOLIC BLOOD PRESSURE: 159 MMHG | WEIGHT: 282 LBS | DIASTOLIC BLOOD PRESSURE: 87 MMHG | TEMPERATURE: 97.8 F | HEART RATE: 71 BPM | HEIGHT: 68 IN

## 2018-10-15 DIAGNOSIS — N39.46 MIXED STRESS AND URGE URINARY INCONTINENCE: Primary | ICD-10-CM

## 2018-10-15 DIAGNOSIS — N36.1 URETHRAL DIVERTICULUM: ICD-10-CM

## 2018-10-15 PROCEDURE — 52000 CYSTOURETHROSCOPY: CPT | Performed by: UROLOGY

## 2018-10-15 PROCEDURE — 99999 PR OFFICE/OUTPT VISIT,PROCEDURE ONLY: CPT | Performed by: UROLOGY

## 2018-12-03 ENCOUNTER — HOSPITAL ENCOUNTER (OUTPATIENT)
Age: 73
Discharge: HOME | End: 2018-12-03
Payer: MEDICARE

## 2018-12-03 LAB
ALT SERPL-CCNC: 18 U/L (ref 5–33)
ANION GAP SERPL CALCULATED.3IONS-SCNC: 13 MMOL/L (ref 9–17)
BUN BLDV-MCNC: 28 MG/DL (ref 8–23)
BUN/CREAT BLD: ABNORMAL (ref 9–20)
CALCIUM IONIZED: 1.21 MMOL/L (ref 1.13–1.33)
CALCIUM SERPL-MCNC: 9.4 MG/DL (ref 8.6–10.4)
CHLORIDE BLD-SCNC: 103 MMOL/L (ref 98–107)
CHOLESTEROL/HDL RATIO: 3.6
CHOLESTEROL: 177 MG/DL
CO2: 28 MMOL/L (ref 20–31)
CREAT SERPL-MCNC: 1.04 MG/DL (ref 0.5–0.9)
GFR AFRICAN AMERICAN: >60 ML/MIN
GFR NON-AFRICAN AMERICAN: 52 ML/MIN
GFR SERPL CREATININE-BSD FRML MDRD: ABNORMAL ML/MIN/{1.73_M2}
GFR SERPL CREATININE-BSD FRML MDRD: ABNORMAL ML/MIN/{1.73_M2}
GLUCOSE BLD-MCNC: 116 MG/DL (ref 70–99)
HDLC SERPL-MCNC: 49 MG/DL
LDL CHOLESTEROL: 103 MG/DL (ref 0–130)
POTASSIUM SERPL-SCNC: 4.4 MMOL/L (ref 3.7–5.3)
SODIUM BLD-SCNC: 144 MMOL/L (ref 135–144)
TRIGL SERPL-MCNC: 123 MG/DL
TSH SERPL DL<=0.05 MIU/L-ACNC: 2.05 MIU/L (ref 0.3–5)
URIC ACID: 11.5 MG/DL (ref 2.4–5.7)
VITAMIN D 25-HYDROXY: 13.8 NG/ML (ref 30–100)
VLDLC SERPL CALC-MCNC: NORMAL MG/DL (ref 1–30)

## 2018-12-03 PROCEDURE — 82306 VITAMIN D 25 HYDROXY: CPT

## 2018-12-03 PROCEDURE — 80048 BASIC METABOLIC PNL TOTAL CA: CPT

## 2018-12-03 PROCEDURE — 84550 ASSAY OF BLOOD/URIC ACID: CPT

## 2018-12-03 PROCEDURE — 84443 ASSAY THYROID STIM HORMONE: CPT

## 2018-12-03 PROCEDURE — 36415 COLL VENOUS BLD VENIPUNCTURE: CPT

## 2018-12-03 PROCEDURE — 82330 ASSAY OF CALCIUM: CPT

## 2018-12-03 PROCEDURE — 80061 LIPID PANEL: CPT

## 2018-12-03 PROCEDURE — 84460 ALANINE AMINO (ALT) (SGPT): CPT

## 2018-12-06 ENCOUNTER — HOSPITAL ENCOUNTER (OUTPATIENT)
Age: 73
Discharge: HOME | End: 2018-12-06
Payer: MEDICARE

## 2018-12-06 LAB
HCT VFR BLD CALC: 36.5 % (ref 36–46)
HEMOGLOBIN: 12 G/DL (ref 12–16)
MCH RBC QN AUTO: 31 PG (ref 26–34)
MCHC RBC AUTO-ENTMCNC: 32.9 G/DL (ref 31–37)
MCV RBC AUTO: 94.2 FL (ref 80–100)
NRBC AUTOMATED: ABNORMAL PER 100 WBC
PDW BLD-RTO: 14.3 % (ref 11.5–14.9)
PLATELET # BLD: 144 K/UL (ref 150–450)
PMV BLD AUTO: 8.4 FL (ref 6–12)
RBC # BLD: 3.87 M/UL (ref 4–5.2)
WBC # BLD: 4.7 K/UL (ref 3.5–11)

## 2018-12-06 PROCEDURE — 36415 COLL VENOUS BLD VENIPUNCTURE: CPT

## 2018-12-06 PROCEDURE — 85027 COMPLETE CBC AUTOMATED: CPT

## 2018-12-17 ENCOUNTER — HOSPITAL ENCOUNTER (OUTPATIENT)
Dept: WOMENS IMAGING | Age: 73
Discharge: HOME | End: 2018-12-19
Payer: MEDICARE

## 2018-12-17 DIAGNOSIS — N95.1 MENOPAUSAL SYMPTOM: ICD-10-CM

## 2018-12-17 DIAGNOSIS — Z12.39 BREAST CANCER SCREENING: ICD-10-CM

## 2018-12-17 PROCEDURE — 77063 BREAST TOMOSYNTHESIS BI: CPT

## 2018-12-17 PROCEDURE — 77080 DXA BONE DENSITY AXIAL: CPT

## 2019-01-02 ENCOUNTER — HOSPITAL ENCOUNTER (OUTPATIENT)
Age: 74
Setting detail: SPECIMEN
Discharge: HOME | End: 2019-01-02
Payer: MEDICARE

## 2019-01-02 ENCOUNTER — TELEPHONE (OUTPATIENT)
Dept: UROLOGY | Age: 74
End: 2019-01-02

## 2019-01-02 DIAGNOSIS — R30.0 DYSURIA: Primary | ICD-10-CM

## 2019-01-02 DIAGNOSIS — R31.0 GROSS HEMATURIA: ICD-10-CM

## 2019-01-02 LAB
-: ABNORMAL
AMORPHOUS: ABNORMAL
BACTERIA: ABNORMAL
BILIRUBIN URINE: NEGATIVE
CASTS UA: ABNORMAL /LPF (ref 0–8)
COLOR: YELLOW
COMMENT UA: ABNORMAL
CRYSTALS, UA: ABNORMAL /HPF
EPITHELIAL CELLS UA: ABNORMAL /HPF (ref 0–5)
GLUCOSE URINE: NEGATIVE
KETONES, URINE: NEGATIVE
LEUKOCYTE ESTERASE, URINE: ABNORMAL
MUCUS: ABNORMAL
NITRITE, URINE: NEGATIVE
OTHER OBSERVATIONS UA: ABNORMAL
PH UA: 6 (ref 5–8)
PROTEIN UA: NEGATIVE
RBC UA: ABNORMAL /HPF (ref 0–4)
RENAL EPITHELIAL, UA: ABNORMAL /HPF
SPECIFIC GRAVITY UA: 1.02 (ref 1–1.03)
TRICHOMONAS: ABNORMAL
TURBIDITY: CLEAR
URINE HGB: ABNORMAL
UROBILINOGEN, URINE: NORMAL
WBC UA: ABNORMAL /HPF (ref 0–5)
YEAST: ABNORMAL

## 2019-01-04 DIAGNOSIS — N39.0 URINARY TRACT INFECTION WITHOUT HEMATURIA, SITE UNSPECIFIED: Primary | ICD-10-CM

## 2019-01-04 LAB
CULTURE: ABNORMAL
Lab: ABNORMAL
ORGANISM: ABNORMAL
SPECIMEN DESCRIPTION: ABNORMAL
STATUS: ABNORMAL

## 2019-01-04 RX ORDER — SULFAMETHOXAZOLE AND TRIMETHOPRIM 800; 160 MG/1; MG/1
1 TABLET ORAL 2 TIMES DAILY
Qty: 14 TABLET | Refills: 0 | Status: SHIPPED | OUTPATIENT
Start: 2019-01-04 | End: 2019-01-11

## 2019-01-14 ENCOUNTER — OFFICE VISIT (OUTPATIENT)
Dept: UROLOGY | Age: 74
End: 2019-01-14
Payer: MEDICARE

## 2019-01-14 VITALS
BODY MASS INDEX: 44.25 KG/M2 | WEIGHT: 292 LBS | SYSTOLIC BLOOD PRESSURE: 119 MMHG | HEIGHT: 68 IN | HEART RATE: 85 BPM | DIASTOLIC BLOOD PRESSURE: 59 MMHG | TEMPERATURE: 97.7 F

## 2019-01-14 DIAGNOSIS — R35.0 FREQUENCY OF URINATION: ICD-10-CM

## 2019-01-14 DIAGNOSIS — N36.1 URETHRAL DIVERTICULUM: ICD-10-CM

## 2019-01-14 DIAGNOSIS — R39.15 URGENCY OF URINATION: Primary | ICD-10-CM

## 2019-01-14 DIAGNOSIS — N39.46 MIXED INCONTINENCE: ICD-10-CM

## 2019-01-14 PROCEDURE — G8484 FLU IMMUNIZE NO ADMIN: HCPCS | Performed by: UROLOGY

## 2019-01-14 PROCEDURE — G8399 PT W/DXA RESULTS DOCUMENT: HCPCS | Performed by: UROLOGY

## 2019-01-14 PROCEDURE — 1123F ACP DISCUSS/DSCN MKR DOCD: CPT | Performed by: UROLOGY

## 2019-01-14 PROCEDURE — G8417 CALC BMI ABV UP PARAM F/U: HCPCS | Performed by: UROLOGY

## 2019-01-14 PROCEDURE — 3017F COLORECTAL CA SCREEN DOC REV: CPT | Performed by: UROLOGY

## 2019-01-14 PROCEDURE — 4040F PNEUMOC VAC/ADMIN/RCVD: CPT | Performed by: UROLOGY

## 2019-01-14 PROCEDURE — 1101F PT FALLS ASSESS-DOCD LE1/YR: CPT | Performed by: UROLOGY

## 2019-01-14 PROCEDURE — 1036F TOBACCO NON-USER: CPT | Performed by: UROLOGY

## 2019-01-14 PROCEDURE — 0509F URINE INCON PLAN DOCD: CPT | Performed by: UROLOGY

## 2019-01-14 PROCEDURE — G8427 DOCREV CUR MEDS BY ELIG CLIN: HCPCS | Performed by: UROLOGY

## 2019-01-14 PROCEDURE — 99214 OFFICE O/P EST MOD 30 MIN: CPT | Performed by: UROLOGY

## 2019-01-14 PROCEDURE — 1090F PRES/ABSN URINE INCON ASSESS: CPT | Performed by: UROLOGY

## 2019-01-14 RX ORDER — OXYBUTYNIN CHLORIDE 10 MG/1
10 TABLET, EXTENDED RELEASE ORAL DAILY
Qty: 90 TABLET | Refills: 3 | Status: SHIPPED | OUTPATIENT
Start: 2019-01-14 | End: 2020-01-14

## 2019-01-14 RX ORDER — MIRABEGRON 50 MG/1
50 TABLET, FILM COATED, EXTENDED RELEASE ORAL DAILY
Qty: 30 TABLET | Refills: 11 | Status: SHIPPED | OUTPATIENT
Start: 2019-01-14 | End: 2019-04-09 | Stop reason: ALTCHOICE

## 2019-04-02 ENCOUNTER — HOSPITAL ENCOUNTER (OUTPATIENT)
Dept: MRI IMAGING | Age: 74
Discharge: HOME | End: 2019-04-04
Payer: MEDICARE

## 2019-04-02 DIAGNOSIS — I67.1 CEREBRAL ANEURYSM WITHOUT RUPTURE: ICD-10-CM

## 2019-04-02 LAB
BUN BLDV-MCNC: 31 MG/DL (ref 8–23)
CREAT SERPL-MCNC: 1.19 MG/DL (ref 0.5–0.9)
GFR AFRICAN AMERICAN: 54 ML/MIN
GFR NON-AFRICAN AMERICAN: 45 ML/MIN
GFR SERPL CREATININE-BSD FRML MDRD: ABNORMAL ML/MIN/{1.73_M2}
GFR SERPL CREATININE-BSD FRML MDRD: ABNORMAL ML/MIN/{1.73_M2}

## 2019-04-02 PROCEDURE — A9579 GAD-BASE MR CONTRAST NOS,1ML: HCPCS | Performed by: PSYCHIATRY & NEUROLOGY

## 2019-04-02 PROCEDURE — 70546 MR ANGIOGRAPH HEAD W/O&W/DYE: CPT

## 2019-04-02 PROCEDURE — 84520 ASSAY OF UREA NITROGEN: CPT

## 2019-04-02 PROCEDURE — 6360000004 HC RX CONTRAST MEDICATION: Performed by: PSYCHIATRY & NEUROLOGY

## 2019-04-02 PROCEDURE — 2580000003 HC RX 258: Performed by: PSYCHIATRY & NEUROLOGY

## 2019-04-02 PROCEDURE — 36415 COLL VENOUS BLD VENIPUNCTURE: CPT

## 2019-04-02 PROCEDURE — 82565 ASSAY OF CREATININE: CPT

## 2019-04-02 RX ORDER — SODIUM CHLORIDE 0.9 % (FLUSH) 0.9 %
10 SYRINGE (ML) INJECTION PRN
Status: DISCONTINUED | OUTPATIENT
Start: 2019-04-02 | End: 2019-04-05 | Stop reason: HOSPADM

## 2019-04-02 RX ADMIN — GADOTERIDOL 20 ML: 279.3 INJECTION, SOLUTION INTRAVENOUS at 09:06

## 2019-04-02 RX ADMIN — Medication 10 ML: at 09:06

## 2019-04-09 ENCOUNTER — OFFICE VISIT (OUTPATIENT)
Dept: NEUROLOGY | Age: 74
End: 2019-04-09
Payer: MEDICARE

## 2019-04-09 VITALS
BODY MASS INDEX: 40.8 KG/M2 | WEIGHT: 269.2 LBS | SYSTOLIC BLOOD PRESSURE: 137 MMHG | HEIGHT: 68 IN | HEART RATE: 80 BPM | DIASTOLIC BLOOD PRESSURE: 72 MMHG

## 2019-04-09 DIAGNOSIS — I67.1 ANEURYSM OF LEFT INTERNAL CAROTID ARTERY: ICD-10-CM

## 2019-04-09 DIAGNOSIS — I67.1 ANEURYSM OF RIGHT INTERNAL CAROTID ARTERY: ICD-10-CM

## 2019-04-09 DIAGNOSIS — I67.1 CEREBRAL ANEURYSM WITHOUT RUPTURE: Primary | ICD-10-CM

## 2019-04-09 DIAGNOSIS — Z95.828 MRI-SAFE ENDOVASCULAR ANEURYSM COIL PRESENT: ICD-10-CM

## 2019-04-09 PROCEDURE — 3017F COLORECTAL CA SCREEN DOC REV: CPT | Performed by: PSYCHIATRY & NEUROLOGY

## 2019-04-09 PROCEDURE — 4040F PNEUMOC VAC/ADMIN/RCVD: CPT | Performed by: PSYCHIATRY & NEUROLOGY

## 2019-04-09 PROCEDURE — 1090F PRES/ABSN URINE INCON ASSESS: CPT | Performed by: PSYCHIATRY & NEUROLOGY

## 2019-04-09 PROCEDURE — G8399 PT W/DXA RESULTS DOCUMENT: HCPCS | Performed by: PSYCHIATRY & NEUROLOGY

## 2019-04-09 PROCEDURE — 99215 OFFICE O/P EST HI 40 MIN: CPT | Performed by: PSYCHIATRY & NEUROLOGY

## 2019-04-09 PROCEDURE — G8427 DOCREV CUR MEDS BY ELIG CLIN: HCPCS | Performed by: PSYCHIATRY & NEUROLOGY

## 2019-04-09 PROCEDURE — G8417 CALC BMI ABV UP PARAM F/U: HCPCS | Performed by: PSYCHIATRY & NEUROLOGY

## 2019-04-09 PROCEDURE — 1123F ACP DISCUSS/DSCN MKR DOCD: CPT | Performed by: PSYCHIATRY & NEUROLOGY

## 2019-04-09 PROCEDURE — 1036F TOBACCO NON-USER: CPT | Performed by: PSYCHIATRY & NEUROLOGY

## 2019-04-09 ASSESSMENT — ENCOUNTER SYMPTOMS
GASTROINTESTINAL NEGATIVE: 1
EYES NEGATIVE: 1

## 2019-04-09 NOTE — PROGRESS NOTES
The [address] Note    [address]   [address] [place] [address],  [address] [facility], [address] [place] [address]   P: [phone]   F: [phone]    Pt Name: Christiano Mirza  MRN: [id number]  YOB: 1945  Date of evaluation: 4/9/2019  Primary Care Physician: Gabi Louie     Reason for evaluation:  Headache    SUBJECTIVE:   History of Chief Complaint:    Christiano Mirza is a 72 y.o. woman who presents with headache for 10 days and abnormal imaging testing, for which she had a complete work up and found to have giant left ICA cavernous aneurysm. S/P PED device with coiling on June 7th, 2017 with good results on June 2018    Doing well     No active issues    Interim Hx:  No hospitalization   On full ASA and off Plavix    Review of Systems   Constitutional: Negative. Eyes: Negative. Cardiovascular: Negative. Gastrointestinal: Negative. Genitourinary: Positive for frequency. Negative for dysuria. Skin: Negative. Neurological: Positive for headaches. Incontinence    Allergies  is allergic to dicloxacillin; pcn [penicillins]; and sulfa antibiotics. Medications  Prior to Admission medications    Medication Sig Start Date End Date Taking?  Authorizing Provider   Mirabegron ER 50 MG TB24 Take 1 tablet by mouth daily Indications: Generic for Myrbetiq   Yes Historical Provider, MD   oxybutynin (DITROPAN-XL) 10 MG extended release tablet Take 1 tablet by mouth daily 1/14/19  Yes Lazarus Saltness, MD   acetaminophen (TYLENOL) 500 MG tablet Take 1,000 mg by mouth every 6 hours as needed for Pain   Yes Historical Provider, MD   aspirin 325 MG tablet Take 325 mg by mouth daily    Yes Historical Provider, MD   pravastatin (PRAVACHOL) 40 MG tablet Take 40 mg by mouth nightly   Yes Historical Provider, MD   lisinopril-hydrochlorothiazide (PRINZIDE;ZESTORETIC) 20-12.5 MG per tablet Take 2 tablets by mouth daily   Yes Historical Provider, MD allopurinol (ZYLOPRIM) 300 MG tablet Take 300 mg by mouth daily   Yes Historical Provider, MD    Scheduled Meds:  Continuous Infusions:  PRN Meds:.  Past Medical History   has a past medical history of Aneurysm, cerebral, Arthritis, Breast cancer ([facility] [address] Diabetes mellitus ([facility] [address] H/O transfusion, Hyperlipidemia, Hypertension, Porphyria ([facility] [address] and Wears glasses. Past Surgical History   has a past surgical history that includes Hysterectomy (1989); Breast lumpectomy (Left, 2011); other surgical history (06/2017); and other surgical history (Left, 12/21/2017). Social History   reports that she quit smoking about 14 years ago. Her smoking use included cigarettes. She started smoking about 56 years ago. She smoked 1.00 pack per day. She has never used smokeless tobacco.   reports that she does not drink alcohol. reports that she does not use drugs. Family History  family history includes Arthritis in her son; Colon Cancer in her brother; Diabetes in her paternal grandmother; Heart Attack in her brother; Heart Disease in her mother; High Blood Pressure in her son; Kidney Disease in her mother; No Known Problems in her maternal grandfather, maternal grandmother, and paternal grandfather; Other in her father. Review of Systems:  CONSTITUTIONAL:  negative for fevers, chills, fatigue and malaise    EYES:  negative for double vision, blurred vision and photophobia     HEENT:  negative for tinnitus, epistaxis and sore throat    RESPIRATORY:  negative for cough, shortness of breath, wheezing    CARDIOVASCULAR:  negative for chest pain, palpitations, syncope, edema    GASTROINTESTINAL:  negative for nausea, vomiting    GENITOURINARY:  negative for incontinence    MUSCULOSKELETAL:  negative for neck or back pain    NEUROLOGICAL:  Negative for weakness and tingling  negative for headaches and dizziness    PSYCHIATRIC:  negative for anxiety      Review of systems otherwise negative.     Family History   Problem Relation Age of Onset    Heart Disease Mother     Kidney Disease Mother         One Rulon Billy Other Father         DROWN    Heart Attack Brother     No Known Problems Maternal Grandmother     No Known Problems Maternal Grandfather     Diabetes Paternal Grandmother     No Known Problems Paternal Grandfather     Colon Cancer Brother     High Blood Pressure Son     Arthritis Son      Social History     Socioeconomic History    Marital status:       Spouse name: Not on file    Number of children: 1    Years of education: Not on file    Highest education level: Not on file   Occupational History    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   Social Needs    Financial resource strain: Not on file    Food insecurity:     Worry: Not on file     Inability: Not on file    Transportation needs:     Medical: Not on file     Non-medical: Not on file   Tobacco Use    Smoking status: Former Smoker     Packs/day: 1.00     Types: Cigarettes     Start date:      Last attempt to quit: 2004     Years since quittin.5    Smokeless tobacco: Never Used   Substance and Sexual Activity    Alcohol use: No    Drug use: No    Sexual activity: Not on file   Lifestyle    Physical activity:     Days per week: Not on file     Minutes per session: Not on file    Stress: Not on file   Relationships    Social connections:     Talks on phone: Not on file     Gets together: Not on file     Attends Voodoo service: Not on file     Active member of club or organization: Not on file     Attends meetings of clubs or organizations: Not on file     Relationship status: Not on file    Intimate partner violence:     Fear of current or ex partner: Not on file     Emotionally abused: Not on file     Physically abused: Not on file     Forced sexual activity: Not on file   Other Topics Concern    Not on file   Social History Narrative    Not on file       OBJECTIVE:   Vitals: /72 (Site: Right Upper Arm, Position: Sitting, Cuff Size: Large Adult)   Pulse 80   Ht 5' 7.99\" (1.727 m)   Wt 269 lb 3.2 oz (122.1 kg)   LMP 12/20/2011 (Approximate)   BMI 40.94 kg/m²   General appearance: Lying in bed, NAD  HEENT: Head: Normocephalic, no lesions, without obvious abnormality. Neck: no adenopathy, no carotid bruit, no JVD, supple, symmetrical, trachea midline and thyroid not enlarged, symmetric, no tenderness/mass/nodules  Lungs: clear to auscultation bilaterally  Heart: regular rate and rhythm, S1, S2 normal, no murmur, click, rub or gallop  Abdomen: soft, non-tender; nondistended, bowel sounds normal  Extremities: extremities normal, atraumatic, no cyanosis or edema    Neurologic: Mental status: Alert, oriented, thought content appropriate, Alert and oriented x 3,   Language/speech: intact language, attention, knowledge  CN: II-XII intact  MOTOR: 5/5 throughout with normal tone and bulk for age  SENSORY: LT and PP symmetrical and intact  COORDINATION: F to N and Gait intact for age    LABS:   No results for input(s): WBC, HGB, HCT, PLT, NA, K, CL, CO2, BUN, CREATININE, MG, PHOS, CALCIUM, PTT, INR, AST, ALT, BILITOT, BILIDIR, NITRU, COLORU, BACTERIA in the last 72 hours. Invalid input(s): PT, WBCU, RBCU, LEUKOCYTESUA  No results for input(s): ALKPHOS, ALT, AST, BILITOT, BILIDIR, LABALBU, AMYLASE, LIPASE in the last 72 hours. RADIOLOGY:   Images were personally reviewed including:          IMPRESSIONS:   Corinne Bowling is a 81 y.o. female who presents with headache for 10 days and abnormal test for which she had a complete work up and found to have giant left ICA cavernous aneurysm. S/P PED device with coiling on June 7th, 2017 with good results on June 2018    Doing well     No active issues    Almost completely gone with <5% residual     does not have any pertinent problems on file.   PLANS:   Pipeline stent assisted coil embolization on June 7, 2017  ASA   FU two years angio in June of 2019    Consultation Visit Time:  40 minutes Patient given educational materials - see patient instructions. Discussed use, benefit, and side effects of prescribed medications. Personally reviewed imaging with patients and all questions answered. Pt voiced understanding. Patient agreed with treatment plan. Follow up as directed below. Greater than 50% of the time was for counseling and providing answer to the patient question. The findings and the plan discussed with the patient and all her questions were answered. Thank you very much for your referral, please do not hesitate to contact me with any questions.     Ethan Torres MD Pager: [phone]  Stroke, [facility]  [facility]   [address]

## 2019-04-15 ENCOUNTER — OFFICE VISIT (OUTPATIENT)
Dept: UROLOGY | Age: 74
End: 2019-04-15
Payer: MEDICARE

## 2019-04-15 VITALS
WEIGHT: 293 LBS | DIASTOLIC BLOOD PRESSURE: 67 MMHG | BODY MASS INDEX: 41.95 KG/M2 | HEIGHT: 70 IN | TEMPERATURE: 98 F | HEART RATE: 73 BPM | SYSTOLIC BLOOD PRESSURE: 124 MMHG

## 2019-04-15 DIAGNOSIS — R35.0 FREQUENCY OF URINATION: ICD-10-CM

## 2019-04-15 DIAGNOSIS — R39.15 URGENCY OF URINATION: Primary | ICD-10-CM

## 2019-04-15 DIAGNOSIS — N39.46 MIXED INCONTINENCE: ICD-10-CM

## 2019-04-15 DIAGNOSIS — R35.1 NOCTURIA: ICD-10-CM

## 2019-04-15 PROCEDURE — 99214 OFFICE O/P EST MOD 30 MIN: CPT | Performed by: UROLOGY

## 2019-04-15 PROCEDURE — G8399 PT W/DXA RESULTS DOCUMENT: HCPCS | Performed by: UROLOGY

## 2019-04-15 PROCEDURE — 1090F PRES/ABSN URINE INCON ASSESS: CPT | Performed by: UROLOGY

## 2019-04-15 PROCEDURE — 1036F TOBACCO NON-USER: CPT | Performed by: UROLOGY

## 2019-04-15 PROCEDURE — G8427 DOCREV CUR MEDS BY ELIG CLIN: HCPCS | Performed by: UROLOGY

## 2019-04-15 PROCEDURE — 1123F ACP DISCUSS/DSCN MKR DOCD: CPT | Performed by: UROLOGY

## 2019-04-15 PROCEDURE — 0509F URINE INCON PLAN DOCD: CPT | Performed by: UROLOGY

## 2019-04-15 PROCEDURE — G8417 CALC BMI ABV UP PARAM F/U: HCPCS | Performed by: UROLOGY

## 2019-04-15 PROCEDURE — 3017F COLORECTAL CA SCREEN DOC REV: CPT | Performed by: UROLOGY

## 2019-04-15 PROCEDURE — 4040F PNEUMOC VAC/ADMIN/RCVD: CPT | Performed by: UROLOGY

## 2019-04-15 ASSESSMENT — ENCOUNTER SYMPTOMS
COLOR CHANGE: 0
VOMITING: 0
ABDOMINAL PAIN: 0
SHORTNESS OF BREATH: 0
EYE PAIN: 0
NAUSEA: 0
COUGH: 0
EYE REDNESS: 0
WHEEZING: 0
BACK PAIN: 0

## 2019-04-15 NOTE — PROGRESS NOTES
Review of Systems   Constitutional: Negative for activity change, chills and fever. Eyes: Negative for pain, redness and visual disturbance. Respiratory: Negative for cough, shortness of breath and wheezing. Cardiovascular: Negative for chest pain and leg swelling. Gastrointestinal: Negative for abdominal pain, nausea and vomiting. Genitourinary: Positive for frequency (intermittent). Negative for difficulty urinating, dysuria, hematuria, pelvic pain, vaginal bleeding and vaginal discharge. Musculoskeletal: Negative for back pain, joint swelling and myalgias. Skin: Negative for color change and rash. Neurological: Negative for dizziness, tremors and numbness. Hematological: Negative for adenopathy. Does not bruise/bleed easily.

## 2019-04-15 NOTE — PROGRESS NOTES
[facility]  [facility] [place]  [address]  [address]  [address] [facility] [address]  Dept: [address] [facility] Urology Office Note - Established    Patient:  Mitchel Dick  YOB: 1945  Date: 4/15/2019    The patient is a 72 y.o. female whopresents today for evaluation of the following problems:   Chief Complaint   Patient presents with    Urinary Frequency     3 mo check, pt states she did not do pelvic floor therapy but can live with the symptoms the way they are        HPI  Overactive Bladder:  Patient is here today for an overactive bladder which started a few year(s) ago. Recently the OAB symptoms: are improving  Current medical Rx for OAB: oxybutynin  Frequency: 2 hours  Nocturia x 2   Consumption of bladder irritants? no  Incontinence? Stress incontinence: Severity = mild. Urge Incontinence:  Severity = mild. Number of pads per day: 2      Pt doing significantly better with oxybutynin. Summary of old records: N/A    Additional History: N/A    Procedures Today: N/A    Urinalysis today:  No results found for this visit on 04/15/19.     Imaging Reviewed during this Office Visit: none  (results were independently reviewed by physician and radiology report verified)    AUA Symptom Score (4/15/2019):  INCOMPLETE EMPTYING: How often have you had the sensation of not emptying your bladder?: Not at all  FREQUENCY: How often do you have to urinate less than every two hours?: Less than Half the time  INTERMITTENCY: How often have you found you stopped and started again several times when you urinated?: Not at all  URGENCY: How often have you found it difficult to postpone urination?: Not at all  WEAK STREAM: How often have you had a weak urinary stream?: Not at all  STRAINING: How often have you had to strain to start  urination?: Not at all  @(4105)@  TOTAL I-PSS SCORE[de-identified] 3  How would you feel if you were to spend the rest of your life with your urinary allopurinol (ZYLOPRIM) 300 MG tablet Take 300 mg by mouth daily        (All medications reviewed and updated by provider sincelast office visit or hospitalization)   Dicloxacillin; Pcn [penicillins]; and Sulfa antibiotics  Social History     Tobacco Use   Smoking Status Former Smoker    Packs/day: 1.00    Types: Cigarettes    Start date:     Last attempt to quit: 2004    Years since quittin.5   Smokeless Tobacco Never Used      (If patient a smoker, smoking cessation counseling offered)     Social History     Substance and Sexual Activity   Alcohol Use No       REVIEW OF SYSTEMS:  Review of Systems      Physical Exam:      Vitals:    04/15/19 1049   BP: 124/67   Pulse: 73   Temp: 98 °F (36.7 °C)     Body mass index is 42.19 kg/m². Patient is a 72 y.o. female in noacute distress and alert and oriented to person, place and time. Physical Exam  Constitutional: Patient in no acute distress. Neuro: Alert andoriented to person, place and time. Psych: Mood normal, affect normal  Skin: No rash noted  HEENT: Head: Normocephalic and atraumatic  Conjunctivae and EOM are normal. Pupils are equal, round  Nose: Normal  Right External Ear: Normal; Left External Ear: Normal  Mouth: Mucosa Moist  Neck: Supple  Lungs: Respiratory effort is normal  Cardiovascular: Warm & Pink  Abdomen: Soft, non-tender, non-distended with no CVA,  No flank tenderness,  Or hepatosplenomegaly   Lymphatics: No palpable lymphadenopathy. Bladder non-tender and not distended. Musculoskeletal: Normalgait and station      Assessment and Plan      1. Urgency of urination    2. Frequency of urination    3. Mixed incontinence    4. Nocturia           Plan:   Cont oxybutytnin  F/u 6 mo     Return in about 6 months (around 10/15/2019). Prescriptions Ordered:  No orders of the defined types were placed in this encounter. Orders Placed:  No orders of the defined types were placed in this encounter.            April Free, MD    Agree with the ROS entered by the MA.

## 2019-05-24 ENCOUNTER — ANESTHESIA EVENT (OUTPATIENT)
Dept: OPERATING ROOM | Age: 74
End: 2019-05-24
Payer: MEDICARE

## 2019-05-24 ENCOUNTER — HOSPITAL ENCOUNTER (OUTPATIENT)
Age: 74
Setting detail: OUTPATIENT SURGERY
Discharge: HOME | End: 2019-05-24
Attending: SURGERY | Admitting: SURGERY
Payer: MEDICARE

## 2019-05-24 ENCOUNTER — ANESTHESIA (OUTPATIENT)
Dept: OPERATING ROOM | Age: 74
End: 2019-05-24
Payer: MEDICARE

## 2019-05-24 VITALS
BODY MASS INDEX: 42.44 KG/M2 | HEIGHT: 68 IN | DIASTOLIC BLOOD PRESSURE: 78 MMHG | RESPIRATION RATE: 14 BRPM | SYSTOLIC BLOOD PRESSURE: 157 MMHG | WEIGHT: 280 LBS | OXYGEN SATURATION: 97 % | HEART RATE: 65 BPM | TEMPERATURE: 95.9 F

## 2019-05-24 VITALS — SYSTOLIC BLOOD PRESSURE: 128 MMHG | DIASTOLIC BLOOD PRESSURE: 74 MMHG | TEMPERATURE: 96.8 F | OXYGEN SATURATION: 81 %

## 2019-05-24 PROCEDURE — 2500000003 HC RX 250 WO HCPCS

## 2019-05-24 PROCEDURE — 7100000031 HC ASPR PHASE II RECOVERY - ADDTL 15 MIN: Performed by: SURGERY

## 2019-05-24 PROCEDURE — 7100000001 HC PACU RECOVERY - ADDTL 15 MIN: Performed by: SURGERY

## 2019-05-24 PROCEDURE — 3609010400 HC COLONOSCOPY POLYPECTOMY HOT BIOPSY: Performed by: SURGERY

## 2019-05-24 PROCEDURE — 7100000000 HC PACU RECOVERY - FIRST 15 MIN: Performed by: SURGERY

## 2019-05-24 PROCEDURE — 2780000010 HC IMPLANT OTHER: Performed by: SURGERY

## 2019-05-24 PROCEDURE — 7100000030 HC ASPR PHASE II RECOVERY - FIRST 15 MIN: Performed by: SURGERY

## 2019-05-24 PROCEDURE — 2580000003 HC RX 258: Performed by: ANESTHESIOLOGY

## 2019-05-24 PROCEDURE — 3700000000 HC ANESTHESIA ATTENDED CARE: Performed by: SURGERY

## 2019-05-24 PROCEDURE — 2709999900 HC NON-CHARGEABLE SUPPLY: Performed by: SURGERY

## 2019-05-24 PROCEDURE — 6360000002 HC RX W HCPCS

## 2019-05-24 PROCEDURE — 3700000001 HC ADD 15 MINUTES (ANESTHESIA): Performed by: SURGERY

## 2019-05-24 PROCEDURE — 88305 TISSUE EXAM BY PATHOLOGIST: CPT

## 2019-05-24 DEVICE — WORKING LENGTH 235CM, WORKING CHANNEL 2.8MM
Type: IMPLANTABLE DEVICE | Site: DESCENDING COLON | Status: FUNCTIONAL
Brand: RESOLUTION 360 CLIP

## 2019-05-24 RX ORDER — SODIUM CHLORIDE, SODIUM LACTATE, POTASSIUM CHLORIDE, CALCIUM CHLORIDE 600; 310; 30; 20 MG/100ML; MG/100ML; MG/100ML; MG/100ML
INJECTION, SOLUTION INTRAVENOUS CONTINUOUS
Status: DISCONTINUED | OUTPATIENT
Start: 2019-05-24 | End: 2019-05-24 | Stop reason: HOSPADM

## 2019-05-24 RX ORDER — PROPOFOL 10 MG/ML
INJECTION, EMULSION INTRAVENOUS PRN
Status: DISCONTINUED | OUTPATIENT
Start: 2019-05-24 | End: 2019-05-24 | Stop reason: SDUPTHER

## 2019-05-24 RX ORDER — LIDOCAINE HYDROCHLORIDE 10 MG/ML
INJECTION, SOLUTION EPIDURAL; INFILTRATION; INTRACAUDAL; PERINEURAL PRN
Status: DISCONTINUED | OUTPATIENT
Start: 2019-05-24 | End: 2019-05-24 | Stop reason: SDUPTHER

## 2019-05-24 RX ORDER — ONDANSETRON 2 MG/ML
INJECTION INTRAMUSCULAR; INTRAVENOUS PRN
Status: DISCONTINUED | OUTPATIENT
Start: 2019-05-24 | End: 2019-05-24 | Stop reason: SDUPTHER

## 2019-05-24 RX ORDER — FENTANYL CITRATE 50 UG/ML
INJECTION, SOLUTION INTRAMUSCULAR; INTRAVENOUS PRN
Status: DISCONTINUED | OUTPATIENT
Start: 2019-05-24 | End: 2019-05-24 | Stop reason: SDUPTHER

## 2019-05-24 RX ORDER — DEXAMETHASONE SODIUM PHOSPHATE 4 MG/ML
INJECTION, SOLUTION INTRA-ARTICULAR; INTRALESIONAL; INTRAMUSCULAR; INTRAVENOUS; SOFT TISSUE PRN
Status: DISCONTINUED | OUTPATIENT
Start: 2019-05-24 | End: 2019-05-24 | Stop reason: SDUPTHER

## 2019-05-24 RX ADMIN — ONDANSETRON 4 MG: 2 INJECTION INTRAMUSCULAR; INTRAVENOUS at 09:28

## 2019-05-24 RX ADMIN — LIDOCAINE HYDROCHLORIDE 50 MG: 10 INJECTION, SOLUTION EPIDURAL; INFILTRATION; INTRACAUDAL; PERINEURAL at 09:04

## 2019-05-24 RX ADMIN — FENTANYL CITRATE 50 MCG: 50 INJECTION, SOLUTION INTRAMUSCULAR; INTRAVENOUS at 09:29

## 2019-05-24 RX ADMIN — PROPOFOL 200 MG: 10 INJECTION, EMULSION INTRAVENOUS at 09:04

## 2019-05-24 RX ADMIN — FENTANYL CITRATE 50 MCG: 50 INJECTION, SOLUTION INTRAMUSCULAR; INTRAVENOUS at 09:12

## 2019-05-24 RX ADMIN — SODIUM CHLORIDE, POTASSIUM CHLORIDE, SODIUM LACTATE AND CALCIUM CHLORIDE: 600; 310; 30; 20 INJECTION, SOLUTION INTRAVENOUS at 08:15

## 2019-05-24 RX ADMIN — DEXAMETHASONE SODIUM PHOSPHATE 4 MG: 4 INJECTION, SOLUTION INTRA-ARTICULAR; INTRALESIONAL; INTRAMUSCULAR; INTRAVENOUS; SOFT TISSUE at 09:09

## 2019-05-24 ASSESSMENT — PULMONARY FUNCTION TESTS
PIF_VALUE: 3
PIF_VALUE: 16
PIF_VALUE: 16
PIF_VALUE: 1
PIF_VALUE: 9
PIF_VALUE: 16
PIF_VALUE: 2
PIF_VALUE: 9
PIF_VALUE: 1
PIF_VALUE: 16
PIF_VALUE: 13
PIF_VALUE: 9
PIF_VALUE: 9
PIF_VALUE: 0
PIF_VALUE: 2
PIF_VALUE: 0
PIF_VALUE: 16
PIF_VALUE: 4
PIF_VALUE: 16
PIF_VALUE: 8
PIF_VALUE: 9
PIF_VALUE: 18
PIF_VALUE: 16
PIF_VALUE: 3
PIF_VALUE: 16
PIF_VALUE: 9
PIF_VALUE: 16
PIF_VALUE: 3
PIF_VALUE: 9
PIF_VALUE: 17
PIF_VALUE: 2
PIF_VALUE: 16
PIF_VALUE: 16
PIF_VALUE: 8
PIF_VALUE: 10
PIF_VALUE: 8
PIF_VALUE: 9
PIF_VALUE: 19
PIF_VALUE: 2
PIF_VALUE: 1
PIF_VALUE: 16
PIF_VALUE: 16
PIF_VALUE: 8
PIF_VALUE: 16
PIF_VALUE: 6
PIF_VALUE: 12
PIF_VALUE: 2
PIF_VALUE: 16
PIF_VALUE: 2
PIF_VALUE: 2

## 2019-05-24 ASSESSMENT — PAIN DESCRIPTION - PAIN TYPE: TYPE: SURGICAL PAIN

## 2019-05-24 ASSESSMENT — PAIN - FUNCTIONAL ASSESSMENT: PAIN_FUNCTIONAL_ASSESSMENT: 0-10

## 2019-05-24 ASSESSMENT — PAIN DESCRIPTION - DESCRIPTORS: DESCRIPTORS: DISCOMFORT;DULL

## 2019-05-24 ASSESSMENT — PAIN SCALES - GENERAL
PAINLEVEL_OUTOF10: 0
PAINLEVEL_OUTOF10: 0

## 2019-05-24 NOTE — OP NOTE
PROCEDURE NOTE    DATE OF PROCEDURE: 5/24/2019    SURGEON: Sepideh Sullivan MD    ASSISTANT: None    PREOPERATIVE DIAGNOSIS: screening colonoscopy    POSTOPERATIVE DIAGNOSIS: cecal polyp/ ascending colon polyp/rectal polyp/ sigmoid diverticulosis with spasms/ tortuous sigmoid/ fair prep    OPERATION: Total colonoscopy to cecum with multiple polypectomies with hot snare polypectomy technique and cold biopsy forceps and placement of resolution clip at the ascending colon polypectomy site    ANESTHESIA: General    ESTIMATED BLOOD LOSS: None    COMPLICATIONS: None     SPECIMENS:  Was Obtained: Cecal polyp/ascending colon polyp/rectal polyp    HISTORY: The patient is a 72y.o. year old female with history of above preop diagnosis. I recommended colonoscopy with possible biopsy or polypectomy and I explained the risk, benefits, expected outcome, and alternatives to the procedure. Risks included but are not limited to bleeding, infection, respiratory distress, hypotension, and perforation of the colon and possibility of missing a lesion. The patient understands and is in agreement. PROCEDURE: The patient was given IV conscious sedation. The patient's SPO2 remained above 90% throughout the procedure. Digital rectal exam was normal.  The colonoscope was inserted through the anus into the rectum and advanced under direct vision to the cecum without difficulty. Terminal ileum was examined for approximately 2 inches. The prep was fair. Findings:    Cecum/Ascending colon: abnormal: Cecal polyp removed with cold biopsy forceps/ascending colon polyp removed with hot snare polypectomy technique and retrieved and sent to pathology and resolution clip application performed    Transverse colon: abnormal: Diverticulosis    Descending/Sigmoid colon: abnormal: Sigmoid diverticulosis with spasm. Tortuous sigmoid colon.     Rectum/Anus: examined in normal and retroflexed positions and was abnormal: Rectal polyp removed with cold biopsy forceps. Withdrawal Time was (minutes): 25 somewhat of a prolonged procedure      Next screening colonoscopy: 3 years. If screening is less than 10 years the recommended reason is due:polyps    The colon was decompressed. While withdrawing the scope the above findings were verified and the scope was removed. The patient tolerated the procedure and conscious sedation without unusual events. In the recovery room patient was examined and remains hemodynamically stable. Discharge home when criteria met. Recommendations/Plan:   1. F/U Biopsies  2. F/U In Office as instructed  3. Discussed with the family  4. High fiber diet   5.  Precautions to avoid constipation    Electronically signed by Zac Baker MD  on 5/24/2019 at 9:51 AM

## 2019-05-24 NOTE — H&P
HISTORY and [address]       NAME:  Rosas Jewell  MRN: [id number]   YOB: 1945   Date: 5/24/2019   Age: 72 y.o. Gender: female       COMPLAINT AND PRESENT HISTORY:   Rosas Jewell is 72 y.o.,   female, having a screening colonoscopy. Pt has no hx of colonoscopy. She had one episode of nausea, vomiting diarrhea about 2 mos ago. She reports at that time she had bright red blood per rectum, filling toilet bowl. She has not had any other bleeding since then. Patient denies any GI symptoms. No nausea / vomiting, No diarrhea or constipation. No abdominal pains or cramping. No heartburn, no changes in the color, caliber or consistency of the stools. No fever or chills.     PAST MEDICAL HISTORY     Past Medical History:   Diagnosis Date    Aneurysm, cerebral 05/22/2017    Arthritis     gout    Breast cancer ([address] 2011    left-lumpectomy followed by chemo and radiation    Diabetes mellitus ([address]     not any more, ACTOS discontinued     H/O transfusion 2011    2 Units    Hyperlipidemia     ON RX    Hypertension     ON RX    Porphyria ([address]     Wears glasses      SURGICAL HISTORY       Past Surgical History:   Procedure Laterality Date    BREAST LUMPECTOMY Left 2011    [address]    TOTAL    OTHER SURGICAL HISTORY  06/2017    CEREBRA EMBO COILING WITH STENT    OTHER SURGICAL HISTORY Left 12/21/2017    coil embolization       FAMILY HISTORY       Family History   Problem Relation Age of Onset    Heart Disease Mother     Kidney Disease Mother         One kindney    Other Father         DROWN    Heart Attack Brother     No Known Problems Maternal Grandmother     No Known Problems Maternal Grandfather     Diabetes Paternal Grandmother     No Known Problems Paternal Grandfather     Colon Cancer Brother     High Blood Pressure Son     Arthritis Son        SOCIAL HISTORY       Social History     Socioeconomic History    Marital status:      Spouse name: Not on file    Number of children: 1    Years of education: Not on file    Highest education level: Not on file   Occupational History    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   Social Needs    Financial resource strain: Not on file    Food insecurity:     Worry: Not on file     Inability: Not on file    Transportation needs:     Medical: Not on file     Non-medical: Not on file   Tobacco Use    Smoking status: Former Smoker     Packs/day: 1.00     Types: Cigarettes     Start date:      Last attempt to quit: 2004     Years since quittin.6    Smokeless tobacco: Never Used   Substance and Sexual Activity    Alcohol use: No    Drug use: No    Sexual activity: Not on file   Lifestyle    Physical activity:     Days per week: Not on file     Minutes per session: Not on file    Stress: Not on file   Relationships    Social connections:     Talks on phone: Not on file     Gets together: Not on file     Attends Baptism service: Not on file     Active member of club or organization: Not on file     Attends meetings of clubs or organizations: Not on file     Relationship status: Not on file    Intimate partner violence:     Fear of current or ex partner: Not on file     Emotionally abused: Not on file     Physically abused: Not on file     Forced sexual activity: Not on file   Other Topics Concern    Not on file   Social History Narrative    Not on file           REVIEW OF SYSTEMS      Allergies   Allergen Reactions    Dicloxacillin Hives    Pcn [Penicillins] Hives    Sulfa Antibiotics        No current facility-administered medications on file prior to encounter.       Current Outpatient Medications on File Prior to Encounter   Medication Sig Dispense Refill    oxybutynin (DITROPAN-XL) 10 MG extended release tablet Take 1 tablet by mouth daily 90 tablet 3    acetaminophen (TYLENOL) 500 MG tablet Take 1,000 mg by mouth every 6 hours as needed for Pain      on expansion, normal breath sounds. ABDOMEN:  Obese. Soft on palpation. No localized tenderness. No guarding or rigidity. No palpable organomegaly. LYMPHATICS:  No palpable cervical lymphadenopathy. LOCOMOTOR, BACK AND SPINE:  No tenderness or deformities. EXTREMITIES:  Symmetrical, 1+ pitting LE edema. No calf tenderness. No discoloration or ulcerations. NEUROLOGIC:  The patient is conscious, alert, oriented, No apparent focal sensory or motor deficits.                                                                                    PROVISIONAL DIAGNOSES / SURGERY:      Screening Colonoscopy     SHA Flannery CNP on 5/24/2019 at 8:17 AM

## 2019-05-24 NOTE — ANESTHESIA POSTPROCEDURE EVALUATION
Department of Anesthesiology  Postprocedure Note    Patient: Nusrat Padilla  MRN: [id number]  YOB: 1945  Date of evaluation: 5/24/2019  Time:  11:49 AM     Procedure Summary     Date:  05/24/19 Room / Location:  [address] [place] [address] Karrie Rodas 05 / [address]    Anesthesia Start:  5845 Anesthesia Stop:  1000    Procedure:  COLONOSCOPY POLYPECTOMY HOT BIOPSY (N/A ) Diagnosis:  (SCREENING   PAT ON ADMIT OFFICE TO FAX)    Surgeon:  Robb Rojas MD Responsible Provider:  Amos Meza MD    Anesthesia Type:  general ASA Status:  3          Anesthesia Type: general    Carlos Phase I: Carlos Score: 8    Carlos Phase II: Carlos Score: 10    Last vitals: Reviewed and per EMR flowsheets.        Anesthesia Post Evaluation    Comments: POST- ANESTHESIA EVALUATION       Pt Name: Nusrat Padilla  MRN: [id number]  YOB: 1945  Date of evaluation: 5/24/2019  Time:  11:49 AM      BP (!) 157/78   Pulse 65   Temp 95.9 °F (35.5 °C)   Resp 14   Ht 5' 8\" (1.727 m)   Wt 280 lb (127 kg)   LMP 12/20/2011 (Approximate)   SpO2 97%   BMI 42.57 kg/m²      Consciousness Level  Awake  Cardiopulmonary Status  Stable  Pain Adequately Treated YES  Nausea / Vomiting  NO  Adequate Hydration  YES  Anesthesia Related Complications NONE      Electronically signed by Ann Allen MD on 5/24/2019 at 11:49 AM

## 2019-05-24 NOTE — ANESTHESIA PRE PROCEDURE
Department of Anesthesiology  Preprocedure Note       Name:  Tellis Carrel   Age:  72 y.o.  :  1945                                          MRN:  [id number]         Date:  2019      Surgeon: Tai Chicas):  Satya Urbano MD    Procedure: COLORECTAL CANCER SCREENING, NOT HIGH RISK (N/A )    Medications prior to admission:   Prior to Admission medications    Medication Sig Start Date End Date Taking? Authorizing Provider   oxybutynin (DITROPAN-XL) 10 MG extended release tablet Take 1 tablet by mouth daily 19  Yes Tip Rico MD   acetaminophen (TYLENOL) 500 MG tablet Take 1,000 mg by mouth every 6 hours as needed for Pain   Yes Historical Provider, MD   aspirin 325 MG tablet Take 325 mg by mouth daily    Yes Historical Provider, MD   pravastatin (PRAVACHOL) 40 MG tablet Take 40 mg by mouth nightly   Yes Historical Provider, MD   lisinopril-hydrochlorothiazide (PRINZIDE;ZESTORETIC) 20-12.5 MG per tablet Take 2 tablets by mouth daily   Yes Historical Provider, MD   allopurinol (ZYLOPRIM) 300 MG tablet Take 300 mg by mouth daily   Yes Historical Provider, MD       Current medications:    Current Facility-Administered Medications   Medication Dose Route Frequency Provider Last Rate Last Dose    lactated ringers infusion   Intravenous Continuous [place] MD Onel 100 mL/hr at 19 0815         Allergies:     Allergies   Allergen Reactions    Dicloxacillin Hives    Pcn [Penicillins] Hives    Sulfa Antibiotics        Problem List:    Patient Active Problem List   Diagnosis Code    DDD (degenerative disc disease), lumbar M51.36    Lumbar stenosis with neurogenic claudication M48.062    Lumbago M54.5    Lumbar radiculopathy, chronic M54.16    Cerebral aneurysm without rupture I67.1    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    MRI-safe endovascular aneurysm coil present Z95.828    Morbid obesity with BMI of 40.0-44.9, adult (HCC) E66.01, Z68.41    Aneurysm of artery ([address] I72.9    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       Past Medical History:        Diagnosis Date    Aneurysm, cerebral 2017    Arthritis     gout    [facility] [address] [address]     left-lumpectomy followed by chemo and radiation    Diabetes mellitus ([facility] [address]     not any more, ACTOS discontinued     H/O transfusion     2 Units    Hyperlipidemia     ON RX    Hypertension     ON RX    Porphyria ([facility] [address]     Wears glasses        Past Surgical History:        Procedure Laterality Date    BREAST LUMPECTOMY Left     [address]    TOTAL    OTHER SURGICAL HISTORY  2017    CEREBRA EMBO COILING WITH STENT    OTHER SURGICAL HISTORY Left 2017    coil embolization       Social History:    Social History     Tobacco Use    Smoking status: Former Smoker     Packs/day: 1.00     Types: Cigarettes     Start date:      Last attempt to quit: 2004     Years since quittin.6    Smokeless tobacco: Never Used   Substance Use Topics    Alcohol use: No                                Counseling given: Not Answered      Vital Signs (Current):   Vitals:    19 0804   BP: 112/69   Pulse: 87   Resp: 16   Temp: 97.2 °F (36.2 °C)   SpO2: 97%   Weight: 280 lb (127 kg)   Height: 5' 8\" (1.727 m)                                              BP Readings from Last 3 Encounters:   19 112/69   04/15/19 124/67   19 137/72       NPO Status: Time of last liquid consumption:                         Time of last solid consumption:                         Date of last liquid consumption: 19                        Date of last solid food consumption: 19    BMI:   Wt Readings from Last 3 Encounters:   19 280 lb (127 kg)   04/15/19 297 lb (134.7 kg)   19 269 lb 3.2 oz (122.1 kg)     Body mass index is 42.57 kg/m².     CBC:   Lab Results   Component Value Date    WBC 4.7 2018    RBC 3.87 2018    RBC 3.41 2012    HGB 12.0 2018 HCT 36.5 12/06/2018    MCV 94.2 12/06/2018    RDW 14.3 12/06/2018     12/06/2018     04/30/2012       CMP:   Lab Results   Component Value Date     12/03/2018    K 4.4 12/03/2018     12/03/2018    CO2 28 12/03/2018    BUN 31 04/02/2019    CREATININE 1.19 04/02/2019    GFRAA 54 04/02/2019    LABGLOM 45 04/02/2019    GLUCOSE 116 12/03/2018    GLUCOSE 94 04/30/2012    PROT 7.3 05/15/2018    CALCIUM 9.4 12/03/2018    BILITOT 0.44 05/15/2018    ALKPHOS 81 05/15/2018    AST 16 05/15/2018    ALT 18 12/03/2018       POC Tests: No results for input(s): POCGLU, POCNA, POCK, POCCL, POCBUN, POCHEMO, POCHCT in the last 72 hours. Coags:   Lab Results   Component Value Date    PROTIME 10.1 12/06/2017    INR 0.9 12/06/2017    APTT 24.0 12/06/2017       HCG (If Applicable): No results found for: PREGTESTUR, PREGSERUM, HCG, HCGQUANT     ABGs: No results found for: PHART, PO2ART, ZIF0OAG, SVW6ZCA, BEART, I1RJRGHA     Type & Screen (If Applicable):  No results found for: LABABO, [address]    Anesthesia Evaluation  Patient summary reviewed and Nursing notes reviewed no history of anesthetic complications:   Airway: Mallampati: III  TM distance: >3 FB   Neck ROM: full  Mouth opening: > = 3 FB Dental: normal exam         Pulmonary:Negative Pulmonary ROS and normal exam  breath sounds clear to auscultation                             Cardiovascular:    (+) hypertension:, hyperlipidemia        Rhythm: regular  Rate: normal                    Neuro/Psych:   (+) neuromuscular disease (Porphyria - no recent attack):,              ROS comment: Lumbar DDD GI/Hepatic/Renal:   (+) morbid obesity          Endo/Other:    (+) Diabetes, : arthritis:., malignancy/cancer (Lt Breast Cancer). Abdominal:           Vascular:           ROS comment: H/o Cerebral Aneurysm s/p surgey. Anesthesia Plan      general     ASA 3       Induction: intravenous.     MIPS: Prophylactic antiemetics

## 2019-05-28 LAB — SURGICAL PATHOLOGY REPORT: NORMAL

## 2019-06-18 ENCOUNTER — HOSPITAL ENCOUNTER (OUTPATIENT)
Age: 74
Discharge: HOME | End: 2019-06-18
Payer: MEDICARE

## 2019-06-18 LAB
CHOLESTEROL, FASTING: 205 MG/DL
CHOLESTEROL/HDL RATIO: 5
HDLC SERPL-MCNC: 41 MG/DL
LDL CHOLESTEROL: 128 MG/DL (ref 0–130)
THYROXINE, FREE: 1.13 NG/DL (ref 0.93–1.7)
TRIGLYCERIDE, FASTING: 181 MG/DL
TSH SERPL DL<=0.05 MIU/L-ACNC: 2.55 MIU/L (ref 0.3–5)
VLDLC SERPL CALC-MCNC: ABNORMAL MG/DL (ref 1–30)

## 2019-06-18 PROCEDURE — 36415 COLL VENOUS BLD VENIPUNCTURE: CPT

## 2019-06-18 PROCEDURE — 80061 LIPID PANEL: CPT

## 2019-06-18 PROCEDURE — 84439 ASSAY OF FREE THYROXINE: CPT

## 2019-06-18 PROCEDURE — 84443 ASSAY THYROID STIM HORMONE: CPT

## 2019-06-27 ENCOUNTER — HOSPITAL ENCOUNTER (OUTPATIENT)
Dept: INTERVENTIONAL RADIOLOGY/VASCULAR | Age: 74
Discharge: HOME | End: 2019-06-29
Payer: MEDICARE

## 2019-06-27 DIAGNOSIS — I72.9 ANEURYSM (HCC): ICD-10-CM

## 2019-06-27 LAB
GFR NON-AFRICAN AMERICAN: 42 ML/MIN
GFR SERPL CREATININE-BSD FRML MDRD: 51 ML/MIN
GFR SERPL CREATININE-BSD FRML MDRD: ABNORMAL ML/MIN/{1.73_M2}
GLUCOSE BLD-MCNC: 125 MG/DL (ref 74–100)
POC CHLORIDE: 105 MMOL/L (ref 98–107)
POC CREATININE: 1.26 MG/DL (ref 0.51–1.19)
POC HEMATOCRIT: 35 % (ref 36–46)
POC HEMOGLOBIN: 11.8 G/DL (ref 12–16)
POC POTASSIUM: 4.1 MMOL/L (ref 3.5–4.5)
POC SODIUM: 141 MMOL/L (ref 138–146)

## 2019-06-27 PROCEDURE — 82435 ASSAY OF BLOOD CHLORIDE: CPT

## 2019-06-27 PROCEDURE — 84295 ASSAY OF SERUM SODIUM: CPT

## 2019-06-27 PROCEDURE — C1894 INTRO/SHEATH, NON-LASER: HCPCS

## 2019-06-27 PROCEDURE — 82565 ASSAY OF CREATININE: CPT

## 2019-06-27 PROCEDURE — 2709999900 HC NON-CHARGEABLE SUPPLY

## 2019-06-27 PROCEDURE — 84132 ASSAY OF SERUM POTASSIUM: CPT

## 2019-06-27 PROCEDURE — 82947 ASSAY GLUCOSE BLOOD QUANT: CPT

## 2019-06-27 PROCEDURE — 85014 HEMATOCRIT: CPT

## 2019-06-27 PROCEDURE — C1887 CATHETER, GUIDING: HCPCS

## 2019-06-27 PROCEDURE — C1769 GUIDE WIRE: HCPCS

## 2019-06-27 PROCEDURE — 2580000003 HC RX 258: Performed by: PSYCHIATRY & NEUROLOGY

## 2019-06-27 RX ORDER — SODIUM CHLORIDE 9 MG/ML
INJECTION, SOLUTION INTRAVENOUS CONTINUOUS
Status: DISCONTINUED | OUTPATIENT
Start: 2019-06-27 | End: 2019-06-30 | Stop reason: HOSPADM

## 2019-06-27 RX ADMIN — SODIUM CHLORIDE: 9 INJECTION, SOLUTION INTRAVENOUS at 10:15

## 2019-06-27 NOTE — PROGRESS NOTES
Dr Jessee Corral in to speak with patient and family in regards to emergency which is delaying patient's procedure. Patient wishes to reschedule and is told that office to call to reschedule. Discharge to home with no further questions. Misty Oliver

## 2019-06-27 NOTE — PROGRESS NOTES
Patient admitted, consent signed, all questions answered. Pt ready for procedure. Call light to reach with side rails up 2 of 2. Sister in law at bedside with patient.

## 2019-07-09 ENCOUNTER — HOSPITAL ENCOUNTER (OUTPATIENT)
Age: 74
Discharge: HOME | End: 2019-07-09
Payer: MEDICARE

## 2019-07-09 LAB — SEDIMENTATION RATE, ERYTHROCYTE: 52 MM (ref 0–20)

## 2019-07-09 PROCEDURE — 36415 COLL VENOUS BLD VENIPUNCTURE: CPT

## 2019-07-09 PROCEDURE — 85651 RBC SED RATE NONAUTOMATED: CPT

## 2019-07-15 ENCOUNTER — HOSPITAL ENCOUNTER (OUTPATIENT)
Dept: INTERVENTIONAL RADIOLOGY/VASCULAR | Age: 74
Discharge: HOME | End: 2019-07-17
Payer: MEDICARE

## 2019-07-15 VITALS
DIASTOLIC BLOOD PRESSURE: 73 MMHG | HEART RATE: 87 BPM | SYSTOLIC BLOOD PRESSURE: 132 MMHG | BODY MASS INDEX: 42.95 KG/M2 | TEMPERATURE: 97.7 F | OXYGEN SATURATION: 99 % | RESPIRATION RATE: 15 BRPM | HEIGHT: 69 IN | WEIGHT: 290 LBS

## 2019-07-15 DIAGNOSIS — I72.9 ANEURYSM (HCC): ICD-10-CM

## 2019-07-15 DIAGNOSIS — I67.1 ANEURYSM OF LEFT INTERNAL CAROTID ARTERY: Primary | ICD-10-CM

## 2019-07-15 LAB
GFR NON-AFRICAN AMERICAN: 42 ML/MIN
GFR SERPL CREATININE-BSD FRML MDRD: 51 ML/MIN
GFR SERPL CREATININE-BSD FRML MDRD: ABNORMAL ML/MIN/{1.73_M2}
GLUCOSE BLD-MCNC: 140 MG/DL (ref 74–100)
POC CHLORIDE: 104 MMOL/L (ref 98–107)
POC CREATININE: 1.26 MG/DL (ref 0.51–1.19)
POC HEMATOCRIT: 34 % (ref 36–46)
POC HEMOGLOBIN: 11.5 G/DL (ref 12–16)
POC POTASSIUM: 4 MMOL/L (ref 3.5–4.5)
POC SODIUM: 140 MMOL/L (ref 138–146)

## 2019-07-15 PROCEDURE — 82435 ASSAY OF BLOOD CHLORIDE: CPT

## 2019-07-15 PROCEDURE — 82947 ASSAY GLUCOSE BLOOD QUANT: CPT

## 2019-07-15 PROCEDURE — 2580000003 HC RX 258: Performed by: PSYCHIATRY & NEUROLOGY

## 2019-07-15 PROCEDURE — 99152 MOD SED SAME PHYS/QHP 5/>YRS: CPT | Performed by: PSYCHIATRY & NEUROLOGY

## 2019-07-15 PROCEDURE — 84295 ASSAY OF SERUM SODIUM: CPT

## 2019-07-15 PROCEDURE — C1769 GUIDE WIRE: HCPCS

## 2019-07-15 PROCEDURE — 76377 3D RENDER W/INTRP POSTPROCES: CPT | Performed by: PSYCHIATRY & NEUROLOGY

## 2019-07-15 PROCEDURE — 84132 ASSAY OF SERUM POTASSIUM: CPT

## 2019-07-15 PROCEDURE — 7100000011 HC PHASE II RECOVERY - ADDTL 15 MIN

## 2019-07-15 PROCEDURE — 6360000002 HC RX W HCPCS: Performed by: STUDENT IN AN ORGANIZED HEALTH CARE EDUCATION/TRAINING PROGRAM

## 2019-07-15 PROCEDURE — 7100000010 HC PHASE II RECOVERY - FIRST 15 MIN

## 2019-07-15 PROCEDURE — C1894 INTRO/SHEATH, NON-LASER: HCPCS

## 2019-07-15 PROCEDURE — C1760 CLOSURE DEV, VASC: HCPCS

## 2019-07-15 PROCEDURE — 36224 PLACE CATH CAROTD ART: CPT | Performed by: PSYCHIATRY & NEUROLOGY

## 2019-07-15 PROCEDURE — 85014 HEMATOCRIT: CPT

## 2019-07-15 PROCEDURE — 6360000004 HC RX CONTRAST MEDICATION: Performed by: PSYCHIATRY & NEUROLOGY

## 2019-07-15 PROCEDURE — 2500000003 HC RX 250 WO HCPCS: Performed by: STUDENT IN AN ORGANIZED HEALTH CARE EDUCATION/TRAINING PROGRAM

## 2019-07-15 PROCEDURE — 2580000003 HC RX 258: Performed by: STUDENT IN AN ORGANIZED HEALTH CARE EDUCATION/TRAINING PROGRAM

## 2019-07-15 PROCEDURE — C1887 CATHETER, GUIDING: HCPCS

## 2019-07-15 PROCEDURE — 2709999900 HC NON-CHARGEABLE SUPPLY

## 2019-07-15 PROCEDURE — 82565 ASSAY OF CREATININE: CPT

## 2019-07-15 RX ORDER — HEPARIN SODIUM (PORCINE) LOCK FLUSH IV SOLN 100 UNIT/ML 100 UNIT/ML
SOLUTION INTRAVENOUS
Status: COMPLETED | OUTPATIENT
Start: 2019-07-15 | End: 2019-07-15

## 2019-07-15 RX ORDER — SODIUM CHLORIDE 0.9 % (FLUSH) 0.9 %
10 SYRINGE (ML) INJECTION EVERY 12 HOURS SCHEDULED
Status: DISCONTINUED | OUTPATIENT
Start: 2019-07-15 | End: 2019-07-18 | Stop reason: HOSPADM

## 2019-07-15 RX ORDER — MIDAZOLAM HYDROCHLORIDE 1 MG/ML
INJECTION INTRAMUSCULAR; INTRAVENOUS
Status: COMPLETED | OUTPATIENT
Start: 2019-07-15 | End: 2019-07-15

## 2019-07-15 RX ORDER — 0.9 % SODIUM CHLORIDE 0.9 %
INTRAVENOUS SOLUTION INTRAVENOUS CONTINUOUS PRN
Status: COMPLETED | OUTPATIENT
Start: 2019-07-15 | End: 2019-07-15

## 2019-07-15 RX ORDER — FENTANYL CITRATE 50 UG/ML
INJECTION, SOLUTION INTRAMUSCULAR; INTRAVENOUS
Status: COMPLETED | OUTPATIENT
Start: 2019-07-15 | End: 2019-07-15

## 2019-07-15 RX ORDER — IODIXANOL 270 MG/ML
50 INJECTION, SOLUTION INTRAVASCULAR
Status: COMPLETED | OUTPATIENT
Start: 2019-07-15 | End: 2019-07-15

## 2019-07-15 RX ORDER — SODIUM CHLORIDE 9 MG/ML
INJECTION, SOLUTION INTRAVENOUS CONTINUOUS
Status: DISCONTINUED | OUTPATIENT
Start: 2019-07-15 | End: 2019-07-18 | Stop reason: HOSPADM

## 2019-07-15 RX ORDER — ACETAMINOPHEN 325 MG/1
650 TABLET ORAL EVERY 4 HOURS PRN
Status: DISCONTINUED | OUTPATIENT
Start: 2019-07-15 | End: 2019-07-18 | Stop reason: HOSPADM

## 2019-07-15 RX ORDER — CLINDAMYCIN PHOSPHATE 600 MG/50ML
INJECTION INTRAVENOUS CONTINUOUS PRN
Status: COMPLETED | OUTPATIENT
Start: 2019-07-15 | End: 2019-07-15

## 2019-07-15 RX ORDER — SODIUM CHLORIDE 0.9 % (FLUSH) 0.9 %
10 SYRINGE (ML) INJECTION PRN
Status: DISCONTINUED | OUTPATIENT
Start: 2019-07-15 | End: 2019-07-18 | Stop reason: HOSPADM

## 2019-07-15 RX ADMIN — CLINDAMYCIN IN 5 PERCENT DEXTROSE 600 MG: 12 INJECTION, SOLUTION INTRAVENOUS at 08:57

## 2019-07-15 RX ADMIN — FENTANYL CITRATE 50 MCG: 50 INJECTION INTRAMUSCULAR; INTRAVENOUS at 08:58

## 2019-07-15 RX ADMIN — IODIXANOL 50 ML: 270 INJECTION, SOLUTION INTRAVASCULAR at 10:07

## 2019-07-15 RX ADMIN — MIDAZOLAM HYDROCHLORIDE 1 MG: 1 INJECTION, SOLUTION INTRAMUSCULAR; INTRAVENOUS at 08:58

## 2019-07-15 RX ADMIN — SODIUM CHLORIDE, PRESERVATIVE FREE 2 ML: 5 INJECTION INTRAVENOUS at 09:15

## 2019-07-15 RX ADMIN — SODIUM CHLORIDE: 9 INJECTION, SOLUTION INTRAVENOUS at 08:12

## 2019-07-15 RX ADMIN — SODIUM CHLORIDE 500 ML: 0.9 INJECTION, SOLUTION INTRAVENOUS at 08:50

## 2019-07-15 ASSESSMENT — PAIN SCALES - GENERAL: PAINLEVEL_OUTOF10: 0

## 2019-07-15 NOTE — SEDATION DOCUMENTATION
Saline bolus started 500ml over one hour. Supine on table. Monitors and strap on. Groins prepped and draped.

## 2019-07-19 ENCOUNTER — HOSPITAL ENCOUNTER (OUTPATIENT)
Age: 74
Discharge: HOME | End: 2019-07-19
Payer: MEDICARE

## 2019-07-19 LAB
BILIRUBIN URINE: NEGATIVE
COLOR: YELLOW
COMMENT UA: NORMAL
GLUCOSE URINE: NEGATIVE
KETONES, URINE: NEGATIVE
LEUKOCYTE ESTERASE, URINE: NEGATIVE
NITRITE, URINE: NEGATIVE
PH UA: 5 (ref 5–8)
PROTEIN UA: NEGATIVE
SEDIMENTATION RATE, ERYTHROCYTE: 67 MM (ref 0–20)
SPECIFIC GRAVITY UA: 1.01 (ref 1–1.03)
TURBIDITY: CLEAR
URINE HGB: NEGATIVE
UROBILINOGEN, URINE: NORMAL
VITAMIN D 25-HYDROXY: 14.2 NG/ML (ref 30–100)

## 2019-07-19 PROCEDURE — 36415 COLL VENOUS BLD VENIPUNCTURE: CPT

## 2019-07-19 PROCEDURE — 82306 VITAMIN D 25 HYDROXY: CPT

## 2019-07-19 PROCEDURE — 87086 URINE CULTURE/COLONY COUNT: CPT

## 2019-07-19 PROCEDURE — 85651 RBC SED RATE NONAUTOMATED: CPT

## 2019-07-19 PROCEDURE — 81003 URINALYSIS AUTO W/O SCOPE: CPT

## 2019-07-20 LAB
CULTURE: NO GROWTH
Lab: NORMAL
SPECIMEN DESCRIPTION: NORMAL

## 2019-07-20 NOTE — H&P
Endovascular Neurosurgery Consult     Pt Name: Adams Fernandes  MRN: [id number]  [place]: 1945  Date of evaluation: 7/15/2019  Primary Care Physician: Crissy Funk  Reason for evaluation: Follow-up diagnostic angiogram.     SUBJECTIVE:   History of Chief Complaint:    Adams Fernandes is a 72 y.o. female with past medical history including hypertension, hyperlipidemia, arthritis. She presented today for follow-up diagnostic angiogram.  She underwent treatment for a large left ICA cavernous aneurysm with a pipeline/coiling in June 2017. Today she is feeling good, no headache, no chest pain. She had a recent urinary tract infection few days ago which is currently under treatment with oral antibiotics (unclear what antibiotic). She reported that the urine infection is improving, pain/dysuria is better, but she is started to have red urine yesterday night.     Allergies  is allergic to dicloxacillin; pcn [penicillins]; and sulfa antibiotics. Medications          Prior to Admission medications    Medication Sig Start Date End Date Taking?  Authorizing Provider   oxybutynin (DITROPAN-XL) 10 MG extended release tablet Take 1 tablet by mouth daily 1/14/19   Yes Jillene Hodgkin, MD   acetaminophen (TYLENOL) 500 MG tablet Take 1,000 mg by mouth every 6 hours as needed for Pain     Yes Historical Provider, MD   aspirin 325 MG tablet Take 325 mg by mouth daily      Yes Historical Provider, MD   pravastatin (PRAVACHOL) 40 MG tablet Take 40 mg by mouth nightly     Yes Historical Provider, MD   lisinopril-hydrochlorothiazide (PRINZIDE;ZESTORETIC) 20-12.5 MG per tablet Take 2 tablets by mouth daily     Yes Historical Provider, MD   allopurinol (ZYLOPRIM) 300 MG tablet Take 300 mg by mouth daily     Yes Historical Provider, MD    Scheduled Meds:   sodium chloride flush  10 mL Intravenous 2 times per day      Continuous Infusions:   sodium chloride        PRN Meds:.sodium chloride flush  Past Medical History   has a 42.83 kg/m²   General appearance: Lying in bed, NAD. HEENT: Atraumatic. Neck: Neck is supple. Lungs: No respiratory distress noted. Heart: Sinus rhythm. Abdomen: Soft nontender. Extremities: No lower limb edema noted. She has good right pedal pulse noted. Neurologic: SHe is awake, following simple commands appropriately, able to name simple objects, intact comprehension, no dysarthria noted. CN: SHe has intact extraocular muscles movements, no facial droop noted, intact sensation on the face on trigeminal distribution bilaterally, palate is symmetric, tongue is midline upon protrusion. MOTOR: He has good strength in both upper and lower extremities, moving both upper and lower extremities against gravity with no drift. SENSORY: Intact sensation to simple touch bilaterally in both upper and lower extremities.     LABS:          Recent Labs     07/15/19  0744   CREATININE 1.26*      No results for input(s): ALKPHOS, ALT, AST, BILITOT, BILIDIR, LABALBU, AMYLASE, LIPASE in the last 72 hours.     RADIOLOGY:   Images were personally reviewed including:  Last DSA in 2018 and 2017.        IMPRESSIONS:   62-year-old white female with past medical history including hypertension, hyperlipidemia. She has a history of a  giant left ICA cavernous aneurysm which was treated in June by pipeline and coiling. She presented today for a follow-up diagnostic cerebral angiogram.  2017     Of note, her GFR is low at 42 and creatinine at 1.26. Will give 500 cc fluid bolus and will do contrast 50/50 and will limit her single-vessel. PLANS:   1. Proceed for DSA.   2. Give 500 cc fluid, use minimal contrast.        Nya Cochran MD  Pager [phone]  Stroke, [facility] Stroke [address]  Electronically signed 7/15/2019 at 8:01 AM

## 2019-08-01 ENCOUNTER — HOSPITAL ENCOUNTER (OUTPATIENT)
Age: 74
Discharge: HOME | End: 2019-08-01
Payer: MEDICARE

## 2019-08-01 ENCOUNTER — HOSPITAL ENCOUNTER (OUTPATIENT)
Dept: NON INVASIVE DIAGNOSTICS | Age: 74
Discharge: HOME | End: 2019-08-01
Payer: MEDICARE

## 2019-08-01 LAB
LV EF: 58 %
LVEF MODALITY: NORMAL
SEDIMENTATION RATE, ERYTHROCYTE: 36 MM (ref 0–20)

## 2019-08-01 PROCEDURE — 85651 RBC SED RATE NONAUTOMATED: CPT

## 2019-08-01 PROCEDURE — 93306 TTE W/DOPPLER COMPLETE: CPT

## 2019-08-01 PROCEDURE — 36415 COLL VENOUS BLD VENIPUNCTURE: CPT

## 2019-08-30 ENCOUNTER — HOSPITAL ENCOUNTER (OUTPATIENT)
Age: 74
Discharge: HOME | End: 2019-08-30
Payer: MEDICARE

## 2019-08-30 LAB — SEDIMENTATION RATE, ERYTHROCYTE: 42 MM (ref 0–20)

## 2019-08-30 PROCEDURE — 36415 COLL VENOUS BLD VENIPUNCTURE: CPT

## 2019-08-30 PROCEDURE — 85651 RBC SED RATE NONAUTOMATED: CPT

## 2019-09-14 ENCOUNTER — APPOINTMENT (OUTPATIENT)
Dept: GENERAL RADIOLOGY | Age: 74
End: 2019-09-14
Payer: MEDICARE

## 2019-09-14 ENCOUNTER — HOSPITAL ENCOUNTER (EMERGENCY)
Age: 74
Discharge: HOME | End: 2019-09-15
Attending: EMERGENCY MEDICINE
Payer: MEDICARE

## 2019-09-14 DIAGNOSIS — R53.83 OTHER FATIGUE: Primary | ICD-10-CM

## 2019-09-14 LAB
-: ABNORMAL
ABSOLUTE EOS #: 0 K/UL (ref 0–0.4)
ABSOLUTE IMMATURE GRANULOCYTE: ABNORMAL K/UL (ref 0–0.3)
ABSOLUTE LYMPH #: 0.9 K/UL (ref 1–4.8)
ABSOLUTE MONO #: 0.5 K/UL (ref 0.1–1.3)
ALBUMIN SERPL-MCNC: 3.7 G/DL (ref 3.5–5.2)
ALBUMIN/GLOBULIN RATIO: ABNORMAL (ref 1–2.5)
ALP BLD-CCNC: 57 U/L (ref 35–104)
ALT SERPL-CCNC: 20 U/L (ref 5–33)
AMORPHOUS: ABNORMAL
ANION GAP SERPL CALCULATED.3IONS-SCNC: 18 MMOL/L (ref 9–17)
AST SERPL-CCNC: 31 U/L
BACTERIA: ABNORMAL
BASOPHILS # BLD: 1 % (ref 0–2)
BASOPHILS ABSOLUTE: 0 K/UL (ref 0–0.2)
BILIRUB SERPL-MCNC: 1.28 MG/DL (ref 0.3–1.2)
BILIRUBIN URINE: NEGATIVE
BUN BLDV-MCNC: 21 MG/DL (ref 8–23)
BUN/CREAT BLD: ABNORMAL (ref 9–20)
CALCIUM SERPL-MCNC: 8.8 MG/DL (ref 8.6–10.4)
CASTS UA: ABNORMAL /LPF
CHLORIDE BLD-SCNC: 99 MMOL/L (ref 98–107)
CO2: 24 MMOL/L (ref 20–31)
COLOR: YELLOW
COMMENT UA: ABNORMAL
CREAT SERPL-MCNC: 1.07 MG/DL (ref 0.5–0.9)
CRYSTALS, UA: ABNORMAL /HPF
DIFFERENTIAL TYPE: ABNORMAL
EOSINOPHILS RELATIVE PERCENT: 0 % (ref 0–4)
EPITHELIAL CELLS UA: ABNORMAL /HPF
GFR AFRICAN AMERICAN: >60 ML/MIN
GFR NON-AFRICAN AMERICAN: 51 ML/MIN
GFR SERPL CREATININE-BSD FRML MDRD: ABNORMAL ML/MIN/{1.73_M2}
GFR SERPL CREATININE-BSD FRML MDRD: ABNORMAL ML/MIN/{1.73_M2}
GLUCOSE BLD-MCNC: 131 MG/DL (ref 70–99)
GLUCOSE URINE: NEGATIVE
HCT VFR BLD CALC: 34.5 % (ref 36–46)
HEMOGLOBIN: 11.7 G/DL (ref 12–16)
IMMATURE GRANULOCYTES: ABNORMAL %
KETONES, URINE: NEGATIVE
LEUKOCYTE ESTERASE, URINE: ABNORMAL
LYMPHOCYTES # BLD: 12 % (ref 24–44)
MCH RBC QN AUTO: 32.1 PG (ref 26–34)
MCHC RBC AUTO-ENTMCNC: 33.9 G/DL (ref 31–37)
MCV RBC AUTO: 94.8 FL (ref 80–100)
MONOCYTES # BLD: 6 % (ref 1–7)
MUCUS: ABNORMAL
MYOGLOBIN: 438 NG/ML (ref 25–58)
NITRITE, URINE: POSITIVE
NRBC AUTOMATED: ABNORMAL PER 100 WBC
OTHER OBSERVATIONS UA: ABNORMAL
PDW BLD-RTO: 16.1 % (ref 11.5–14.9)
PH UA: 6 (ref 5–8)
PLATELET # BLD: 130 K/UL (ref 150–450)
PLATELET ESTIMATE: ABNORMAL
PMV BLD AUTO: 8.1 FL (ref 6–12)
POTASSIUM SERPL-SCNC: 3.5 MMOL/L (ref 3.7–5.3)
PROTEIN UA: ABNORMAL
RBC # BLD: 3.64 M/UL (ref 4–5.2)
RBC # BLD: ABNORMAL 10*6/UL
RBC UA: ABNORMAL /HPF
RENAL EPITHELIAL, UA: ABNORMAL /HPF
SEG NEUTROPHILS: 81 % (ref 36–66)
SEGMENTED NEUTROPHILS ABSOLUTE COUNT: 6 K/UL (ref 1.3–9.1)
SODIUM BLD-SCNC: 141 MMOL/L (ref 135–144)
SPECIFIC GRAVITY UA: 1.02 (ref 1–1.03)
TOTAL CK: 612 U/L (ref 26–192)
TOTAL PROTEIN: 6.3 G/DL (ref 6.4–8.3)
TRICHOMONAS: ABNORMAL
TROPONIN INTERP: ABNORMAL
TROPONIN INTERP: ABNORMAL
TROPONIN T: ABNORMAL NG/ML
TROPONIN T: ABNORMAL NG/ML
TROPONIN, HIGH SENSITIVITY: 30 NG/L (ref 0–14)
TROPONIN, HIGH SENSITIVITY: 32 NG/L (ref 0–14)
TURBIDITY: ABNORMAL
URINE HGB: ABNORMAL
UROBILINOGEN, URINE: NORMAL
WBC # BLD: 7.4 K/UL (ref 3.5–11)
WBC # BLD: ABNORMAL 10*3/UL
WBC UA: ABNORMAL /HPF
YEAST: ABNORMAL

## 2019-09-14 PROCEDURE — 6370000000 HC RX 637 (ALT 250 FOR IP): Performed by: EMERGENCY MEDICINE

## 2019-09-14 PROCEDURE — 80053 COMPREHEN METABOLIC PANEL: CPT

## 2019-09-14 PROCEDURE — 81001 URINALYSIS AUTO W/SCOPE: CPT

## 2019-09-14 PROCEDURE — 84484 ASSAY OF TROPONIN QUANT: CPT

## 2019-09-14 PROCEDURE — 93005 ELECTROCARDIOGRAM TRACING: CPT | Performed by: EMERGENCY MEDICINE

## 2019-09-14 PROCEDURE — 87088 URINE BACTERIA CULTURE: CPT

## 2019-09-14 PROCEDURE — 99284 EMERGENCY DEPT VISIT MOD MDM: CPT

## 2019-09-14 PROCEDURE — 83874 ASSAY OF MYOGLOBIN: CPT

## 2019-09-14 PROCEDURE — 85025 COMPLETE CBC W/AUTO DIFF WBC: CPT

## 2019-09-14 PROCEDURE — 87186 SC STD MICRODIL/AGAR DIL: CPT

## 2019-09-14 PROCEDURE — 71046 X-RAY EXAM CHEST 2 VIEWS: CPT

## 2019-09-14 PROCEDURE — 82550 ASSAY OF CK (CPK): CPT

## 2019-09-14 PROCEDURE — 87086 URINE CULTURE/COLONY COUNT: CPT

## 2019-09-14 PROCEDURE — 36415 COLL VENOUS BLD VENIPUNCTURE: CPT

## 2019-09-14 RX ORDER — CEPHALEXIN 250 MG/1
500 CAPSULE ORAL ONCE
Status: COMPLETED | OUTPATIENT
Start: 2019-09-14 | End: 2019-09-14

## 2019-09-14 RX ADMIN — CEPHALEXIN 500 MG: 250 CAPSULE ORAL at 20:11

## 2019-09-14 ASSESSMENT — PAIN DESCRIPTION - PAIN TYPE: TYPE: ACUTE PAIN

## 2019-09-14 ASSESSMENT — PAIN SCALES - GENERAL: PAINLEVEL_OUTOF10: 4

## 2019-09-14 ASSESSMENT — PAIN DESCRIPTION - DESCRIPTORS: DESCRIPTORS: ACHING

## 2019-09-14 ASSESSMENT — PAIN DESCRIPTION - LOCATION: LOCATION: GENERALIZED

## 2019-09-15 VITALS
HEIGHT: 68 IN | OXYGEN SATURATION: 98 % | BODY MASS INDEX: 42.44 KG/M2 | TEMPERATURE: 98.1 F | WEIGHT: 280 LBS | HEART RATE: 90 BPM | SYSTOLIC BLOOD PRESSURE: 129 MMHG | DIASTOLIC BLOOD PRESSURE: 67 MMHG | RESPIRATION RATE: 16 BRPM

## 2019-09-15 RX ORDER — NITROFURANTOIN 25; 75 MG/1; MG/1
100 CAPSULE ORAL 2 TIMES DAILY
Qty: 20 CAPSULE | Refills: 0 | Status: SHIPPED | OUTPATIENT
Start: 2019-09-15 | End: 2019-09-25

## 2019-09-16 LAB
CULTURE: ABNORMAL
EKG ATRIAL RATE: 103 BPM
EKG P AXIS: 86 DEGREES
EKG P-R INTERVAL: 168 MS
EKG Q-T INTERVAL: 326 MS
EKG QRS DURATION: 92 MS
EKG QTC CALCULATION (BAZETT): 427 MS
EKG R AXIS: 46 DEGREES
EKG T AXIS: 63 DEGREES
EKG VENTRICULAR RATE: 103 BPM
Lab: ABNORMAL
SPECIMEN DESCRIPTION: ABNORMAL

## 2019-09-16 PROCEDURE — 93010 ELECTROCARDIOGRAM REPORT: CPT | Performed by: INTERNAL MEDICINE

## 2019-09-16 ASSESSMENT — ENCOUNTER SYMPTOMS
EYE PAIN: 0
SINUS PAIN: 1
SORE THROAT: 0
ABDOMINAL PAIN: 0
SINUS PRESSURE: 1
DIARRHEA: 0
COUGH: 1
BACK PAIN: 0
SHORTNESS OF BREATH: 0
VOMITING: 0
NAUSEA: 0
CONSTIPATION: 0
CHEST TIGHTNESS: 0

## 2019-09-18 ENCOUNTER — HOSPITAL ENCOUNTER (OUTPATIENT)
Age: 74
Discharge: HOME | End: 2019-09-18
Payer: MEDICARE

## 2019-09-18 LAB
ANION GAP SERPL CALCULATED.3IONS-SCNC: 14 MMOL/L (ref 9–17)
BUN BLDV-MCNC: 26 MG/DL (ref 8–23)
BUN/CREAT BLD: ABNORMAL (ref 9–20)
CALCIUM SERPL-MCNC: 9.4 MG/DL (ref 8.6–10.4)
CHLORIDE BLD-SCNC: 104 MMOL/L (ref 98–107)
CO2: 26 MMOL/L (ref 20–31)
CREAT SERPL-MCNC: 1.06 MG/DL (ref 0.5–0.9)
GFR AFRICAN AMERICAN: >60 ML/MIN
GFR NON-AFRICAN AMERICAN: 51 ML/MIN
GFR SERPL CREATININE-BSD FRML MDRD: ABNORMAL ML/MIN/{1.73_M2}
GFR SERPL CREATININE-BSD FRML MDRD: ABNORMAL ML/MIN/{1.73_M2}
GLUCOSE BLD-MCNC: 117 MG/DL (ref 70–99)
MYOGLOBIN: 232 NG/ML (ref 25–58)
POTASSIUM SERPL-SCNC: 4.6 MMOL/L (ref 3.7–5.3)
SODIUM BLD-SCNC: 144 MMOL/L (ref 135–144)
TOTAL CK: 248 U/L (ref 26–192)
TROPONIN INTERP: ABNORMAL
TROPONIN T: ABNORMAL NG/ML
TROPONIN, HIGH SENSITIVITY: 20 NG/L (ref 0–14)

## 2019-09-18 PROCEDURE — 83874 ASSAY OF MYOGLOBIN: CPT

## 2019-09-18 PROCEDURE — 82550 ASSAY OF CK (CPK): CPT

## 2019-09-18 PROCEDURE — 84484 ASSAY OF TROPONIN QUANT: CPT

## 2019-09-18 PROCEDURE — 36415 COLL VENOUS BLD VENIPUNCTURE: CPT

## 2019-09-18 PROCEDURE — 80048 BASIC METABOLIC PNL TOTAL CA: CPT

## 2019-10-01 ENCOUNTER — HOSPITAL ENCOUNTER (OUTPATIENT)
Dept: NUCLEAR MEDICINE | Age: 74
Discharge: HOME | End: 2019-10-03
Payer: MEDICARE

## 2019-10-01 ENCOUNTER — HOSPITAL ENCOUNTER (OUTPATIENT)
Dept: NON INVASIVE DIAGNOSTICS | Age: 74
Discharge: HOME | End: 2019-10-01
Payer: MEDICARE

## 2019-10-01 VITALS — HEIGHT: 68 IN | BODY MASS INDEX: 42.44 KG/M2 | WEIGHT: 280 LBS

## 2019-10-01 DIAGNOSIS — R77.8 ELEVATED TROPONIN: ICD-10-CM

## 2019-10-01 DIAGNOSIS — R53.83 FATIGUE, UNSPECIFIED TYPE: ICD-10-CM

## 2019-10-01 DIAGNOSIS — R94.31 ABNORMAL EKG: ICD-10-CM

## 2019-10-01 LAB
LV EF: 64 %
LVEF MODALITY: NORMAL

## 2019-10-01 PROCEDURE — A9500 TC99M SESTAMIBI: HCPCS | Performed by: FAMILY MEDICINE

## 2019-10-01 PROCEDURE — 78452 HT MUSCLE IMAGE SPECT MULT: CPT

## 2019-10-01 PROCEDURE — 6360000002 HC RX W HCPCS: Performed by: FAMILY MEDICINE

## 2019-10-01 PROCEDURE — 93017 CV STRESS TEST TRACING ONLY: CPT

## 2019-10-01 PROCEDURE — 2580000003 HC RX 258: Performed by: FAMILY MEDICINE

## 2019-10-01 PROCEDURE — 3430000000 HC RX DIAGNOSTIC RADIOPHARMACEUTICAL: Performed by: FAMILY MEDICINE

## 2019-10-01 RX ORDER — NITROGLYCERIN 0.4 MG/1
0.4 TABLET SUBLINGUAL EVERY 5 MIN PRN
Status: ACTIVE | OUTPATIENT
Start: 2019-10-01 | End: 2019-10-01

## 2019-10-01 RX ORDER — SODIUM CHLORIDE 9 MG/ML
500 INJECTION, SOLUTION INTRAVENOUS CONTINUOUS PRN
Status: ACTIVE | OUTPATIENT
Start: 2019-10-01 | End: 2019-10-01

## 2019-10-01 RX ORDER — ATROPINE SULFATE 0.1 MG/ML
0.5 INJECTION INTRAVENOUS EVERY 5 MIN PRN
Status: ACTIVE | OUTPATIENT
Start: 2019-10-01 | End: 2019-10-01

## 2019-10-01 RX ORDER — SODIUM CHLORIDE 0.9 % (FLUSH) 0.9 %
10 SYRINGE (ML) INJECTION PRN
Status: ACTIVE | OUTPATIENT
Start: 2019-10-01 | End: 2019-10-01

## 2019-10-01 RX ORDER — AMINOPHYLLINE DIHYDRATE 25 MG/ML
50 INJECTION, SOLUTION INTRAVENOUS PRN
Status: ACTIVE | OUTPATIENT
Start: 2019-10-01 | End: 2019-10-01

## 2019-10-01 RX ORDER — SODIUM CHLORIDE 0.9 % (FLUSH) 0.9 %
10 SYRINGE (ML) INJECTION PRN
Status: DISCONTINUED | OUTPATIENT
Start: 2019-10-01 | End: 2019-10-04 | Stop reason: HOSPADM

## 2019-10-01 RX ORDER — METOPROLOL TARTRATE 5 MG/5ML
5 INJECTION INTRAVENOUS EVERY 5 MIN PRN
Status: ACTIVE | OUTPATIENT
Start: 2019-10-01 | End: 2019-10-01

## 2019-10-01 RX ORDER — ALBUTEROL SULFATE 90 UG/1
2 AEROSOL, METERED RESPIRATORY (INHALATION) PRN
Status: ACTIVE | OUTPATIENT
Start: 2019-10-01 | End: 2019-10-01

## 2019-10-01 RX ADMIN — Medication 10 ML: at 09:16

## 2019-10-01 RX ADMIN — REGADENOSON 0.4 MG: 0.08 INJECTION, SOLUTION INTRAVENOUS at 09:16

## 2019-10-01 RX ADMIN — TETRAKIS(2-METHOXYISOBUTYLISOCYANIDE)COPPER(I) TETRAFLUOROBORATE 35.4 MILLICURIE: 1 INJECTION, POWDER, LYOPHILIZED, FOR SOLUTION INTRAVENOUS at 09:18

## 2019-10-01 RX ADMIN — Medication 10 ML: at 07:45

## 2019-10-01 RX ADMIN — Medication 10 ML: at 09:00

## 2019-10-01 RX ADMIN — TETRAKIS(2-METHOXYISOBUTYLISOCYANIDE)COPPER(I) TETRAFLUOROBORATE 10.5 MILLICURIE: 1 INJECTION, POWDER, LYOPHILIZED, FOR SOLUTION INTRAVENOUS at 07:45

## 2019-10-21 ENCOUNTER — OFFICE VISIT (OUTPATIENT)
Dept: UROLOGY | Age: 74
End: 2019-10-21
Payer: MEDICARE

## 2019-10-21 ENCOUNTER — TELEPHONE (OUTPATIENT)
Dept: UROLOGY | Age: 74
End: 2019-10-21

## 2019-10-21 VITALS
SYSTOLIC BLOOD PRESSURE: 136 MMHG | BODY MASS INDEX: 43.95 KG/M2 | WEIGHT: 290 LBS | TEMPERATURE: 98 F | HEART RATE: 72 BPM | DIASTOLIC BLOOD PRESSURE: 70 MMHG | HEIGHT: 68 IN

## 2019-10-21 DIAGNOSIS — R35.1 NOCTURIA: ICD-10-CM

## 2019-10-21 DIAGNOSIS — Z01.818 PRE-OP TESTING: ICD-10-CM

## 2019-10-21 DIAGNOSIS — R35.0 FREQUENCY OF URINATION: ICD-10-CM

## 2019-10-21 DIAGNOSIS — N39.46 MIXED INCONTINENCE: ICD-10-CM

## 2019-10-21 DIAGNOSIS — R39.15 URGENCY OF URINATION: Primary | ICD-10-CM

## 2019-10-21 PROCEDURE — G8417 CALC BMI ABV UP PARAM F/U: HCPCS | Performed by: UROLOGY

## 2019-10-21 PROCEDURE — 1123F ACP DISCUSS/DSCN MKR DOCD: CPT | Performed by: UROLOGY

## 2019-10-21 PROCEDURE — G8399 PT W/DXA RESULTS DOCUMENT: HCPCS | Performed by: UROLOGY

## 2019-10-21 PROCEDURE — 1036F TOBACCO NON-USER: CPT | Performed by: UROLOGY

## 2019-10-21 PROCEDURE — 0509F URINE INCON PLAN DOCD: CPT | Performed by: UROLOGY

## 2019-10-21 PROCEDURE — G8484 FLU IMMUNIZE NO ADMIN: HCPCS | Performed by: UROLOGY

## 2019-10-21 PROCEDURE — 1090F PRES/ABSN URINE INCON ASSESS: CPT | Performed by: UROLOGY

## 2019-10-21 PROCEDURE — G8427 DOCREV CUR MEDS BY ELIG CLIN: HCPCS | Performed by: UROLOGY

## 2019-10-21 PROCEDURE — 99214 OFFICE O/P EST MOD 30 MIN: CPT | Performed by: UROLOGY

## 2019-10-21 PROCEDURE — 4040F PNEUMOC VAC/ADMIN/RCVD: CPT | Performed by: UROLOGY

## 2019-10-21 PROCEDURE — 3017F COLORECTAL CA SCREEN DOC REV: CPT | Performed by: UROLOGY

## 2019-10-21 RX ORDER — PREDNISONE 10 MG/1
10 TABLET ORAL 2 TIMES DAILY
COMMUNITY

## 2019-10-21 ASSESSMENT — ENCOUNTER SYMPTOMS
SHORTNESS OF BREATH: 0
COLOR CHANGE: 0
NAUSEA: 0
EYE PAIN: 0
WHEEZING: 0
EYE REDNESS: 0
ABDOMINAL PAIN: 0
VOMITING: 0
COUGH: 0
BACK PAIN: 0

## 2019-11-11 ENCOUNTER — HOSPITAL ENCOUNTER (OUTPATIENT)
Age: 74
Setting detail: SPECIMEN
Discharge: HOME | End: 2019-11-11
Payer: MEDICARE

## 2019-11-13 LAB
CULTURE: ABNORMAL
Lab: ABNORMAL
SPECIMEN DESCRIPTION: ABNORMAL

## 2019-11-18 ENCOUNTER — PROCEDURE VISIT (OUTPATIENT)
Dept: UROLOGY | Age: 74
End: 2019-11-18
Payer: MEDICARE

## 2019-11-18 VITALS
BODY MASS INDEX: 43.95 KG/M2 | TEMPERATURE: 97.8 F | DIASTOLIC BLOOD PRESSURE: 80 MMHG | WEIGHT: 290 LBS | HEIGHT: 68 IN | SYSTOLIC BLOOD PRESSURE: 130 MMHG

## 2019-11-18 DIAGNOSIS — N32.81 OAB (OVERACTIVE BLADDER): Primary | ICD-10-CM

## 2019-11-18 PROCEDURE — 96372 THER/PROPH/DIAG INJ SC/IM: CPT | Performed by: UROLOGY

## 2019-11-18 PROCEDURE — 52287 CYSTOSCOPY CHEMODENERVATION: CPT | Performed by: UROLOGY

## 2019-11-18 RX ORDER — CIPROFLOXACIN 500 MG/1
500 TABLET, FILM COATED ORAL 2 TIMES DAILY
COMMUNITY
End: 2020-01-06 | Stop reason: ALTCHOICE

## 2020-01-06 ENCOUNTER — OFFICE VISIT (OUTPATIENT)
Dept: UROLOGY | Age: 75
End: 2020-01-06
Payer: MEDICARE

## 2020-01-06 VITALS
WEIGHT: 291.01 LBS | DIASTOLIC BLOOD PRESSURE: 65 MMHG | BODY MASS INDEX: 44.1 KG/M2 | SYSTOLIC BLOOD PRESSURE: 132 MMHG | TEMPERATURE: 98 F | HEART RATE: 69 BPM | HEIGHT: 68 IN

## 2020-01-06 LAB
BILIRUBIN, POC: NORMAL
BLOOD URINE, POC: NORMAL
CLARITY, POC: CLEAR
COLOR, POC: YELLOW
GLUCOSE URINE, POC: NORMAL
KETONES, POC: NORMAL
LEUKOCYTE EST, POC: NORMAL
NITRITE, POC: NORMAL
PH, POC: NORMAL
PROTEIN, POC: NORMAL
SPECIFIC GRAVITY, POC: NORMAL
UROBILINOGEN, POC: NORMAL

## 2020-01-06 PROCEDURE — G8399 PT W/DXA RESULTS DOCUMENT: HCPCS | Performed by: UROLOGY

## 2020-01-06 PROCEDURE — 99214 OFFICE O/P EST MOD 30 MIN: CPT | Performed by: UROLOGY

## 2020-01-06 PROCEDURE — G8484 FLU IMMUNIZE NO ADMIN: HCPCS | Performed by: UROLOGY

## 2020-01-06 PROCEDURE — G8427 DOCREV CUR MEDS BY ELIG CLIN: HCPCS | Performed by: UROLOGY

## 2020-01-06 PROCEDURE — 81003 URINALYSIS AUTO W/O SCOPE: CPT | Performed by: UROLOGY

## 2020-01-06 PROCEDURE — 4040F PNEUMOC VAC/ADMIN/RCVD: CPT | Performed by: UROLOGY

## 2020-01-06 PROCEDURE — 0509F URINE INCON PLAN DOCD: CPT | Performed by: UROLOGY

## 2020-01-06 PROCEDURE — 3017F COLORECTAL CA SCREEN DOC REV: CPT | Performed by: UROLOGY

## 2020-01-06 PROCEDURE — 1090F PRES/ABSN URINE INCON ASSESS: CPT | Performed by: UROLOGY

## 2020-01-06 PROCEDURE — 1036F TOBACCO NON-USER: CPT | Performed by: UROLOGY

## 2020-01-06 PROCEDURE — 1123F ACP DISCUSS/DSCN MKR DOCD: CPT | Performed by: UROLOGY

## 2020-01-06 PROCEDURE — G8417 CALC BMI ABV UP PARAM F/U: HCPCS | Performed by: UROLOGY

## 2020-01-06 PROCEDURE — 51798 US URINE CAPACITY MEASURE: CPT | Performed by: UROLOGY

## 2020-01-06 NOTE — PROGRESS NOTES
several times when you urinated?: Not at all  URGENCY: How often have you found it difficult to postpone urination?: About Half the time  WEAK STREAM: How often have you had a weak urinary stream?: Not at all  STRAINING: How often have you had to strain to start  urination?: Not at all  @(5505)@  TOTAL I-PSS SCORE[de-identified] 9  How would you feel if you were to spend the rest of your life with your urinary condition?: Mostly Satisfied    Last BUN and creatinine:  Lab Results   Component Value Date    BUN 26 (H) 09/18/2019     Lab Results   Component Value Date    CREATININE 1.06 (H) 09/18/2019       Additional Lab/Culture results: none    PAST MEDICAL, FAMILY AND SOCIAL HISTORY UPDATE:  Past Medical History:   Diagnosis Date    Aneurysm, cerebral 05/22/2017    Arthritis     gout    Breast cancer ([facility] [address] 2011    left-lumpectomy followed by chemo and radiation    Diabetes mellitus ([facility] [address]     not any more, ACTOS discontinued     H/O transfusion 2011    2 Units    Hyperlipidemia     ON RX    Hypertension     ON RX    Porphyria ([facility] [address]     Wears glasses      Past Surgical History:   Procedure Laterality Date    BREAST LUMPECTOMY Left 2011    Chemo & Radiation    COLONOSCOPY N/A 5/24/2019    COLONOSCOPY POLYPECTOMY HOT BIOPSY performed by Naty Bhat MD at [facility] OTHER SURGICAL HISTORY  06/2017    CEREBRA EMBO COILING WITH STENT    OTHER SURGICAL HISTORY Left 12/21/2017    coil embolization     Family History   Problem Relation Age of Onset    Heart Disease Mother     Kidney Disease Mother         One Trixie Bailey Other Father         DROWN    Heart Attack Brother     No Known Problems Maternal Grandmother     No Known Problems Maternal Grandfather     Diabetes Paternal Grandmother     No Known Problems Paternal Grandfather     Colon Cancer Brother     High Blood Pressure Son     Arthritis Son      Outpatient Medications Marked as Taking for the 1/6/20 encounter (Office Visit) with Raul Walton MD   Medication Sig Dispense Refill    predniSONE (DELTASONE) 10 MG tablet Take 10 mg by mouth daily      oxybutynin (DITROPAN-XL) 10 MG extended release tablet Take 1 tablet by mouth daily 90 tablet 3    acetaminophen (TYLENOL) 500 MG tablet Take 1,000 mg by mouth every 6 hours as needed for Pain      aspirin 325 MG tablet Take 325 mg by mouth daily       pravastatin (PRAVACHOL) 40 MG tablet Take 40 mg by mouth nightly      lisinopril-hydrochlorothiazide (PRINZIDE;ZESTORETIC) 20-12.5 MG per tablet Take 2 tablets by mouth daily      allopurinol (ZYLOPRIM) 300 MG tablet Take 300 mg by mouth daily        (All medications reviewed and updated by provider sincelast office visit or hospitalization)   Dicloxacillin; Pcn [penicillins]; and Sulfa antibiotics  Social History     Tobacco Use   Smoking Status Former Smoker    Packs/day: 1.00    Types: Cigarettes    Start date: [facility] Last attempt to quit: 9/26/2004    Years since quitting: 15.2   Smokeless Tobacco Never Used      (If patient a smoker, smoking cessation counseling offered)     Social History     Substance and Sexual Activity   Alcohol Use No       REVIEW OF SYSTEMS:  Review of Systems      Physical Exam:      Vitals:    01/06/20 1021   BP: 132/65   Pulse: 69   Temp: 98 °F (36.7 °C)     Body mass index is 44.26 kg/m². Patient is a 69 y.o. female in noacute distress and alert and oriented to person, place and time. Physical Exam  Constitutional: Patient in no acute distress. Neuro: Alert andoriented to person, place and time.   Psych: Mood normal, affect normal  Skin: No rash noted  HEENT: Head: Normocephalic and atraumatic  Conjunctivae and EOM are normal. Pupils are equal, round  Nose: Normal  Right External Ear: Normal; Left External Ear: Normal  Mouth: Mucosa Moist  Neck: Supple  Lungs: Respiratory effort is normal  Cardiovascular: Warm & Pink  Abdomen: Soft, non-tender, non-distended with no CVA,  No flank

## 2020-01-14 NOTE — TELEPHONE ENCOUNTER
Pt was last seen on 1/6/20 after having Botox on 11/18/19. Pt states she still continues to take oxybutynin and is needing a refill. Please advise.

## 2020-01-17 RX ORDER — OXYBUTYNIN CHLORIDE 10 MG/1
TABLET, EXTENDED RELEASE ORAL
Qty: 30 TABLET | Refills: 5 | Status: SHIPPED | OUTPATIENT
Start: 2020-01-17 | End: 2020-04-27

## 2020-01-30 ENCOUNTER — HOSPITAL ENCOUNTER (OUTPATIENT)
Dept: WOMENS IMAGING | Age: 75
Discharge: HOME | End: 2020-02-01
Payer: MEDICARE

## 2020-01-30 PROCEDURE — 77063 BREAST TOMOSYNTHESIS BI: CPT

## 2020-02-12 ENCOUNTER — HOSPITAL ENCOUNTER (OUTPATIENT)
Dept: WOMENS IMAGING | Age: 75
Discharge: HOME | End: 2020-02-14
Payer: MEDICARE

## 2020-02-12 PROCEDURE — G0279 TOMOSYNTHESIS, MAMMO: HCPCS

## 2020-04-27 ENCOUNTER — TELEPHONE (OUTPATIENT)
Dept: UROLOGY | Age: 75
End: 2020-04-27

## 2020-04-27 RX ORDER — OXYBUTYNIN CHLORIDE 10 MG/1
10 TABLET, EXTENDED RELEASE ORAL DAILY
Qty: 90 TABLET | Refills: 0 | Status: SHIPPED | OUTPATIENT
Start: 2020-04-27 | End: 2020-09-08

## 2020-06-03 ENCOUNTER — TELEPHONE (OUTPATIENT)
Dept: NEUROLOGY | Age: 75
End: 2020-06-03

## 2020-07-31 ENCOUNTER — HOSPITAL ENCOUNTER (OUTPATIENT)
Dept: MRI IMAGING | Age: 75
Discharge: HOME | End: 2020-08-02
Payer: MEDICARE

## 2020-07-31 LAB
BUN BLDV-MCNC: 29 MG/DL (ref 8–23)
CREAT SERPL-MCNC: 1.51 MG/DL (ref 0.5–0.9)
GFR AFRICAN AMERICAN: 41 ML/MIN
GFR NON-AFRICAN AMERICAN: 34 ML/MIN
GFR SERPL CREATININE-BSD FRML MDRD: ABNORMAL ML/MIN/{1.73_M2}
GFR SERPL CREATININE-BSD FRML MDRD: ABNORMAL ML/MIN/{1.73_M2}

## 2020-07-31 PROCEDURE — 2580000003 HC RX 258: Performed by: STUDENT IN AN ORGANIZED HEALTH CARE EDUCATION/TRAINING PROGRAM

## 2020-07-31 PROCEDURE — 84520 ASSAY OF UREA NITROGEN: CPT

## 2020-07-31 PROCEDURE — 6360000004 HC RX CONTRAST MEDICATION: Performed by: STUDENT IN AN ORGANIZED HEALTH CARE EDUCATION/TRAINING PROGRAM

## 2020-07-31 PROCEDURE — 82565 ASSAY OF CREATININE: CPT

## 2020-07-31 PROCEDURE — A9579 GAD-BASE MR CONTRAST NOS,1ML: HCPCS | Performed by: STUDENT IN AN ORGANIZED HEALTH CARE EDUCATION/TRAINING PROGRAM

## 2020-07-31 PROCEDURE — 70546 MR ANGIOGRAPH HEAD W/O&W/DYE: CPT

## 2020-07-31 PROCEDURE — 36415 COLL VENOUS BLD VENIPUNCTURE: CPT

## 2020-07-31 RX ORDER — SODIUM CHLORIDE 0.9 % (FLUSH) 0.9 %
10 SYRINGE (ML) INJECTION PRN
Status: DISCONTINUED | OUTPATIENT
Start: 2020-07-31 | End: 2020-08-03 | Stop reason: HOSPADM

## 2020-07-31 RX ORDER — 0.9 % SODIUM CHLORIDE 0.9 %
50 INTRAVENOUS SOLUTION INTRAVENOUS ONCE
Status: COMPLETED | OUTPATIENT
Start: 2020-07-31 | End: 2020-07-31

## 2020-07-31 RX ADMIN — GADOTERIDOL 20 ML: 279.3 INJECTION, SOLUTION INTRAVENOUS at 14:43

## 2020-07-31 RX ADMIN — SODIUM CHLORIDE 50 ML: 9 INJECTION, SOLUTION INTRAVENOUS at 14:43

## 2020-07-31 RX ADMIN — Medication 10 ML: at 14:43

## 2020-08-04 ENCOUNTER — TELEPHONE (OUTPATIENT)
Dept: NEUROLOGY | Age: 75
End: 2020-08-04

## 2020-08-04 NOTE — TELEPHONE ENCOUNTER
Impression    1. Stent assisted coiling of a left cavernous ICA aneurysm with    susceptibility artifact partially limiting evaluation in this region. 2. The stent appears grossly patent. 3. No convincing flow limiting stenosis of the visualized [id number].     4. Evaluation for filling of the aneurysm sac is limited on this exam.           Please review and result for patient

## 2020-08-04 NOTE — TELEPHONE ENCOUNTER
Patient of Dr. Coreas Kinds with no follow up appt noted with him. Please see if she would like to follow up in office with him or wait to speak with him regarding the results when he returns next week.     Feliz Williamson MD

## 2020-08-07 NOTE — TELEPHONE ENCOUNTER
Writer called pt at [phone] discussed the results of the recent MRA head, which showed patent vessel with no clear recanalization of the aneurysm. Imaging was stable relative to prior workup 1 year ago. Pt expressed understanding. Writer also offered to schedule a follow up appt, pt is not interested at this time.

## 2020-09-08 RX ORDER — OXYBUTYNIN CHLORIDE 10 MG/1
TABLET, EXTENDED RELEASE ORAL
Qty: 30 TABLET | Refills: 0 | Status: SHIPPED | OUTPATIENT
Start: 2020-09-08

## 2020-09-28 ENCOUNTER — OFFICE VISIT (OUTPATIENT)
Dept: UROLOGY | Age: 75
End: 2020-09-28
Payer: MEDICARE

## 2020-09-28 VITALS — DIASTOLIC BLOOD PRESSURE: 86 MMHG | TEMPERATURE: 97.7 F | SYSTOLIC BLOOD PRESSURE: 130 MMHG

## 2020-09-28 PROCEDURE — 0509F URINE INCON PLAN DOCD: CPT | Performed by: UROLOGY

## 2020-09-28 PROCEDURE — 3017F COLORECTAL CA SCREEN DOC REV: CPT | Performed by: UROLOGY

## 2020-09-28 PROCEDURE — 1036F TOBACCO NON-USER: CPT | Performed by: UROLOGY

## 2020-09-28 PROCEDURE — 1123F ACP DISCUSS/DSCN MKR DOCD: CPT | Performed by: UROLOGY

## 2020-09-28 PROCEDURE — G8427 DOCREV CUR MEDS BY ELIG CLIN: HCPCS | Performed by: UROLOGY

## 2020-09-28 PROCEDURE — G8399 PT W/DXA RESULTS DOCUMENT: HCPCS | Performed by: UROLOGY

## 2020-09-28 PROCEDURE — 99214 OFFICE O/P EST MOD 30 MIN: CPT | Performed by: UROLOGY

## 2020-09-28 PROCEDURE — G8417 CALC BMI ABV UP PARAM F/U: HCPCS | Performed by: UROLOGY

## 2020-09-28 PROCEDURE — 1090F PRES/ABSN URINE INCON ASSESS: CPT | Performed by: UROLOGY

## 2020-09-28 PROCEDURE — 51798 US URINE CAPACITY MEASURE: CPT | Performed by: UROLOGY

## 2020-09-28 PROCEDURE — 4040F PNEUMOC VAC/ADMIN/RCVD: CPT | Performed by: UROLOGY

## 2020-09-28 ASSESSMENT — ENCOUNTER SYMPTOMS
SHORTNESS OF BREATH: 0
EYE REDNESS: 0
ABDOMINAL PAIN: 0
COUGH: 0
WHEEZING: 0
BACK PAIN: 0
COLOR CHANGE: 0
EYE PAIN: 0
NAUSEA: 0
VOMITING: 0

## 2020-09-28 NOTE — PROGRESS NOTES
[address] [address]  Dept: [address] [facility] Urology Office Note - Established    Patient:  Jean Pierre Craig  YOB: 1945  Date: 9/28/2020    The patient is a 69 y.o. female whopresents today for evaluation of the following problems:   Chief Complaint   Patient presents with    Urinary Frequency     urgency  PVR , med check        HPI  Overactive Bladder:  Patient is here today for an overactive bladder which started a few year(s) ago. Recently the OAB symptoms: show no change  Current medical Rx for OAB: botox  Frequency: 2 hours  Nocturia x 5   Consumption of bladder irritants? no  Incontinence? Stress incontinence: Severity = not present. Urge Incontinence:  Severity = mild. Number of pads per day: 1                  Summary of old records: N/A    Additional History: N/A    Procedures Today: N/A    Urinalysis today:  No results found for this visit on 09/28/20.     Imaging Reviewed during this Office Visit: none  (results were independently reviewed by physician and radiology report verified)    AUA Symptom Score (9/28/2020):  INCOMPLETE EMPTYING: How often have you had the sensation of not emptying your bladder?: Less than Half the time  FREQUENCY: How often do you have to urinate less than every two hours?: Not at all  INTERMITTENCY: How often have you found you stopped and started again several times when you urinated?: Not at all  URGENCY: How often have you found it difficult to postpone urination?: Almost always  WEAK STREAM: How often have you had a weak urinary stream?: More than Half the time  STRAINING: How often have you had to strain to start  urination?: Not at all  NOCTURIA: How many times did you typically get up at night to uriniate?: 3 Times  TOTAL I-PSS SCORE[de-identified] 14  How would you feel if you were to spend the rest of your life with your urinary condition?: Mostly Satisfied    Last BUN and creatinine:  Lab Results   Component Value Date    BUN 29 (H) 07/31/2020     Lab Results   Component Value Date    CREATININE 1.51 (H) 07/31/2020       Additional Lab/Culture results: none    PAST MEDICAL, FAMILY AND SOCIAL HISTORY UPDATE:  Past Medical History:   Diagnosis Date    Aneurysm, cerebral 05/22/2017    Arthritis     gout    Breast cancer ([facility] [address] 2011    left-lumpectomy followed by chemo and radiation    Diabetes mellitus ([facility] [address]     not any more, ACTOS discontinued     H/O transfusion 2011    2 Units    Hyperlipidemia     ON RX    Hypertension     ON RX    Porphyria ([facility] [address]     Wears glasses      Past Surgical History:   Procedure Laterality Date    BREAST LUMPECTOMY Left 2011    Chemo & Radiation    COLONOSCOPY N/A 5/24/2019    COLONOSCOPY POLYPECTOMY HOT BIOPSY performed by Joana Vaughn MD at [place] [facility] OTHER SURGICAL HISTORY  06/2017    CEREBRA EMBO COILING WITH STENT    OTHER SURGICAL HISTORY Left 12/21/2017    coil embolization     Family History   Problem Relation Age of Onset    Heart Disease Mother     Kidney Disease Mother         One kindney    Other Father         DROWN    Heart Attack Brother     No Known Problems Maternal Grandmother     No Known Problems Maternal Grandfather     Diabetes Paternal Grandmother     No Known Problems Paternal Grandfather     Colon Cancer Brother     High Blood Pressure Son     Arthritis Son      Outpatient Medications Marked as Taking for the 9/28/20 encounter (Office Visit) with Brittnee Guy MD   Medication Sig Dispense Refill    oxybutynin (DITROPAN-XL) 10 MG extended release tablet TAKE ONE TABLET BY MOUTH DAILY 30 tablet 0    predniSONE (DELTASONE) 10 MG tablet Take 10 mg by mouth daily      acetaminophen (TYLENOL) 500 MG tablet Take 1,000 mg by mouth every 6 hours as needed for Pain      aspirin 325 MG tablet Take 325 mg by mouth daily       pravastatin (PRAVACHOL) 40 MG tablet Take 40 mg by mouth nightly      lisinopril-hydrochlorothiazide (PRINZIDE;ZESTORETIC) 20-12.5 MG per tablet Take 2 tablets by mouth daily      allopurinol (ZYLOPRIM) 300 MG tablet Take 300 mg by mouth daily        (All medications reviewed and updated by provider sincelast office visit or hospitalization)   Dicloxacillin; Pcn [penicillins]; and Sulfa antibiotics  Social History     Tobacco Use   Smoking Status Former Smoker    Packs/day: 1.00    Types: Cigarettes    Start date:     Last attempt to quit: 2004    Years since quittin.0   Smokeless Tobacco Never Used      (If patient a smoker, smoking cessation counseling offered)     Social History     Substance and Sexual Activity   Alcohol Use No       REVIEW OF SYSTEMS:  Review of Systems      Physical Exam:      Vitals:    20 1332   BP: 130/86   Temp: 97.7 °F (36.5 °C)     There is no height or weight on file to calculate BMI. Patient is a 69 y.o. female in noacute distress and alert and oriented to person, place and time. Physical Exam  Constitutional: Patient in no acute distress. Neuro: Alert andoriented to person, place and time. Psych: Mood normal, affect normal  Skin: No rash noted  HEENT: Head: Normocephalic and atraumatic  Conjunctivae and EOM are normal. Pupils are equal, round  Nose: Normal  Right External Ear: Normal; Left External Ear: Normal  Mouth: Mucosa Moist  Neck: Supple  Lungs: Respiratory effort is normal  Cardiovascular: Warm & Pink  Abdomen: Soft, non-tender, non-distended with no CVA,  No flank tenderness,  Or hepatosplenomegaly   Lymphatics: No palpable lymphadenopathy. Bladder non-tender and not distended. Musculoskeletal: Normalgait and station      and Plan      1. Urgency of urination    2. Urge incontinence of urine    3. Incomplete bladder emptying    4. OAB (overactive bladder)    5. Frequency of urination    6. Nocturia    7.  Recurrent UTI           Plan:   Pt gets rec uti  botox still effective  F/u 6 mo     Return in about 6 months (around 3/28/2021) for bladder scan. Prescriptions Ordered:  No orders of the defined types were placed in this encounter. Orders Placed:  Orders Placed This Encounter   Procedures   Yessenia Tapia MD    Agree with the ROS entered by the MA.

## 2020-10-05 RX ORDER — OXYBUTYNIN CHLORIDE 10 MG/1
TABLET, EXTENDED RELEASE ORAL
Qty: 30 TABLET | Refills: 0 | OUTPATIENT
Start: 2020-10-05

## 2020-10-14 RX ORDER — OXYBUTYNIN CHLORIDE 10 MG/1
TABLET, EXTENDED RELEASE ORAL
Qty: 30 TABLET | Refills: 0 | OUTPATIENT
Start: 2020-10-14

## 2020-10-14 NOTE — TELEPHONE ENCOUNTER
Refill request received for oxybutynin. Pt was last seen on 9/28/20 and was doing well with Botox. No refills necessary, per Dr Pilar Gonzáles. If patient is having increased symptoms, she will need office visit to discuss.

## 2020-11-07 ENCOUNTER — HOSPITAL ENCOUNTER (OUTPATIENT)
Dept: GENERAL RADIOLOGY | Age: 75
Discharge: HOME | End: 2020-11-09
Payer: MEDICARE

## 2020-11-07 ENCOUNTER — HOSPITAL ENCOUNTER (OUTPATIENT)
Age: 75
Discharge: HOME | End: 2020-11-09
Payer: MEDICARE

## 2020-11-07 ENCOUNTER — HOSPITAL ENCOUNTER (OUTPATIENT)
Age: 75
Discharge: HOME | End: 2020-11-07
Payer: MEDICARE

## 2020-11-07 LAB
ALT SERPL-CCNC: 21 U/L (ref 5–33)
ANION GAP SERPL CALCULATED.3IONS-SCNC: 10 MMOL/L (ref 9–17)
BUN BLDV-MCNC: 26 MG/DL (ref 8–23)
BUN/CREAT BLD: ABNORMAL (ref 9–20)
CALCIUM SERPL-MCNC: 9.5 MG/DL (ref 8.6–10.4)
CHLORIDE BLD-SCNC: 106 MMOL/L (ref 98–107)
CHOLESTEROL/HDL RATIO: 2.5
CHOLESTEROL: 170 MG/DL
CO2: 28 MMOL/L (ref 20–31)
CREAT SERPL-MCNC: 1.05 MG/DL (ref 0.5–0.9)
GFR AFRICAN AMERICAN: >60 ML/MIN
GFR NON-AFRICAN AMERICAN: 51 ML/MIN
GFR SERPL CREATININE-BSD FRML MDRD: ABNORMAL ML/MIN/{1.73_M2}
GFR SERPL CREATININE-BSD FRML MDRD: ABNORMAL ML/MIN/{1.73_M2}
GLUCOSE BLD-MCNC: 137 MG/DL (ref 70–99)
HDLC SERPL-MCNC: 67 MG/DL
LDL CHOLESTEROL: 83 MG/DL (ref 0–130)
POTASSIUM SERPL-SCNC: 4.7 MMOL/L (ref 3.7–5.3)
SEDIMENTATION RATE, ERYTHROCYTE: 12 MM (ref 0–30)
SODIUM BLD-SCNC: 144 MMOL/L (ref 135–144)
TRIGL SERPL-MCNC: 100 MG/DL
VLDLC SERPL CALC-MCNC: NORMAL MG/DL (ref 1–30)

## 2020-11-07 PROCEDURE — 85652 RBC SED RATE AUTOMATED: CPT

## 2020-11-07 PROCEDURE — 36415 COLL VENOUS BLD VENIPUNCTURE: CPT

## 2020-11-07 PROCEDURE — 84460 ALANINE AMINO (ALT) (SGPT): CPT

## 2020-11-07 PROCEDURE — 80048 BASIC METABOLIC PNL TOTAL CA: CPT

## 2020-11-07 PROCEDURE — 80061 LIPID PANEL: CPT

## 2020-11-07 PROCEDURE — 71046 X-RAY EXAM CHEST 2 VIEWS: CPT

## 2021-02-12 ENCOUNTER — HOSPITAL ENCOUNTER (OUTPATIENT)
Dept: NON INVASIVE DIAGNOSTICS | Age: 76
Discharge: HOME | End: 2021-02-12
Payer: MEDICARE

## 2021-02-12 ENCOUNTER — HOSPITAL ENCOUNTER (OUTPATIENT)
Age: 76
Discharge: HOME | End: 2021-02-12
Payer: MEDICARE

## 2021-02-12 DIAGNOSIS — R06.02 SHORTNESS OF BREATH: ICD-10-CM

## 2021-02-12 DIAGNOSIS — R60.9 EDEMA, UNSPECIFIED TYPE: ICD-10-CM

## 2021-02-12 DIAGNOSIS — I48.91 ATRIAL FIBRILLATION, UNSPECIFIED TYPE (HCC): ICD-10-CM

## 2021-02-12 LAB
ANION GAP SERPL CALCULATED.3IONS-SCNC: 13 MMOL/L (ref 9–17)
BUN BLDV-MCNC: 40 MG/DL (ref 8–23)
BUN/CREAT BLD: ABNORMAL (ref 9–20)
CALCIUM SERPL-MCNC: 9 MG/DL (ref 8.6–10.4)
CHLORIDE BLD-SCNC: 101 MMOL/L (ref 98–107)
CO2: 28 MMOL/L (ref 20–31)
CREAT SERPL-MCNC: 1.38 MG/DL (ref 0.5–0.9)
GFR AFRICAN AMERICAN: 45 ML/MIN
GFR NON-AFRICAN AMERICAN: 37 ML/MIN
GFR SERPL CREATININE-BSD FRML MDRD: ABNORMAL ML/MIN/{1.73_M2}
GFR SERPL CREATININE-BSD FRML MDRD: ABNORMAL ML/MIN/{1.73_M2}
GLUCOSE BLD-MCNC: 143 MG/DL (ref 70–99)
LV EF: 55 %
LVEF MODALITY: NORMAL
POTASSIUM SERPL-SCNC: 3.6 MMOL/L (ref 3.7–5.3)
SODIUM BLD-SCNC: 142 MMOL/L (ref 135–144)

## 2021-02-12 PROCEDURE — 36415 COLL VENOUS BLD VENIPUNCTURE: CPT

## 2021-02-12 PROCEDURE — 80048 BASIC METABOLIC PNL TOTAL CA: CPT

## 2021-02-12 PROCEDURE — 93306 TTE W/DOPPLER COMPLETE: CPT

## 2021-03-04 ENCOUNTER — HOSPITAL ENCOUNTER (OUTPATIENT)
Dept: WOMENS IMAGING | Age: 76
Discharge: HOME | End: 2021-03-06
Payer: MEDICARE

## 2021-03-04 DIAGNOSIS — Z12.31 SCREENING MAMMOGRAM FOR HIGH-RISK PATIENT: ICD-10-CM

## 2021-03-04 DIAGNOSIS — Z12.31 VISIT FOR SCREENING MAMMOGRAM: ICD-10-CM

## 2021-03-04 PROCEDURE — 77067 SCR MAMMO BI INCL CAD: CPT

## 2021-03-05 ENCOUNTER — HOSPITAL ENCOUNTER (OUTPATIENT)
Age: 76
Discharge: HOME | End: 2021-03-05
Payer: MEDICARE

## 2021-03-05 LAB — POTASSIUM SERPL-SCNC: 4.2 MMOL/L (ref 3.7–5.3)

## 2021-03-05 PROCEDURE — 36415 COLL VENOUS BLD VENIPUNCTURE: CPT

## 2021-03-05 PROCEDURE — 84132 ASSAY OF SERUM POTASSIUM: CPT

## 2021-07-27 ENCOUNTER — HOSPITAL ENCOUNTER (OUTPATIENT)
Age: 76
Setting detail: SPECIMEN
Discharge: HOME | End: 2021-07-27
Payer: MEDICARE

## 2021-07-27 PROCEDURE — 87086 URINE CULTURE/COLONY COUNT: CPT

## 2021-07-28 LAB
CULTURE: NORMAL
Lab: NORMAL
SPECIMEN DESCRIPTION: NORMAL

## 2021-11-05 ENCOUNTER — HOSPITAL ENCOUNTER (OUTPATIENT)
Dept: GENERAL RADIOLOGY | Age: 76
Discharge: HOME | End: 2021-11-07
Payer: MEDICARE

## 2021-11-05 ENCOUNTER — HOSPITAL ENCOUNTER (OUTPATIENT)
Age: 76
Discharge: HOME | End: 2021-11-07
Payer: MEDICARE

## 2021-11-05 ENCOUNTER — HOSPITAL ENCOUNTER (OUTPATIENT)
Age: 76
Setting detail: SPECIMEN
Discharge: HOME | End: 2021-11-05
Payer: MEDICARE

## 2021-11-05 DIAGNOSIS — M54.50 LOW BACK PAIN, UNSPECIFIED BACK PAIN LATERALITY, UNSPECIFIED CHRONICITY, UNSPECIFIED WHETHER SCIATICA PRESENT: ICD-10-CM

## 2021-11-05 LAB
ALT SERPL-CCNC: 22 U/L (ref 5–33)
ANION GAP SERPL CALCULATED.3IONS-SCNC: 11 MMOL/L (ref 9–17)
BUN BLDV-MCNC: 23 MG/DL (ref 8–23)
BUN/CREAT BLD: ABNORMAL (ref 9–20)
CALCIUM SERPL-MCNC: 8.3 MG/DL (ref 8.6–10.4)
CHLORIDE BLD-SCNC: 104 MMOL/L (ref 98–107)
CHOLESTEROL/HDL RATIO: 3.2
CHOLESTEROL: 180 MG/DL
CO2: 30 MMOL/L (ref 20–31)
CREAT SERPL-MCNC: 1.21 MG/DL (ref 0.5–0.9)
GFR AFRICAN AMERICAN: 53 ML/MIN
GFR NON-AFRICAN AMERICAN: 44 ML/MIN
GFR SERPL CREATININE-BSD FRML MDRD: ABNORMAL ML/MIN/{1.73_M2}
GFR SERPL CREATININE-BSD FRML MDRD: ABNORMAL ML/MIN/{1.73_M2}
GLUCOSE BLD-MCNC: 133 MG/DL (ref 70–99)
HDLC SERPL-MCNC: 57 MG/DL
LDL CHOLESTEROL: 100 MG/DL (ref 0–130)
POTASSIUM SERPL-SCNC: 4.6 MMOL/L (ref 3.7–5.3)
SODIUM BLD-SCNC: 145 MMOL/L (ref 135–144)
TRIGL SERPL-MCNC: 113 MG/DL
VITAMIN D 25-HYDROXY: 10.2 NG/ML (ref 30–100)
VLDLC SERPL CALC-MCNC: NORMAL MG/DL (ref 1–30)

## 2021-11-05 PROCEDURE — 80061 LIPID PANEL: CPT

## 2021-11-05 PROCEDURE — 82306 VITAMIN D 25 HYDROXY: CPT

## 2021-11-05 PROCEDURE — 73562 X-RAY EXAM OF KNEE 3: CPT

## 2021-11-05 PROCEDURE — 84460 ALANINE AMINO (ALT) (SGPT): CPT

## 2021-11-05 PROCEDURE — 73502 X-RAY EXAM HIP UNI 2-3 VIEWS: CPT

## 2021-11-05 PROCEDURE — 80048 BASIC METABOLIC PNL TOTAL CA: CPT

## 2021-11-05 PROCEDURE — 36415 COLL VENOUS BLD VENIPUNCTURE: CPT

## 2021-11-05 PROCEDURE — 72100 X-RAY EXAM L-S SPINE 2/3 VWS: CPT

## 2022-03-18 ENCOUNTER — APPOINTMENT (OUTPATIENT)
Dept: CT IMAGING | Age: 77
DRG: 641 | End: 2022-03-18
Payer: MEDICARE

## 2022-03-18 ENCOUNTER — HOSPITAL ENCOUNTER (EMERGENCY)
Age: 77
Discharge: HOME | DRG: 641 | End: 2022-03-18
Attending: EMERGENCY MEDICINE
Payer: MEDICARE

## 2022-03-18 VITALS
HEIGHT: 68 IN | BODY MASS INDEX: 42.44 KG/M2 | OXYGEN SATURATION: 95 % | HEART RATE: 100 BPM | RESPIRATION RATE: 22 BRPM | WEIGHT: 280 LBS | SYSTOLIC BLOOD PRESSURE: 115 MMHG | DIASTOLIC BLOOD PRESSURE: 68 MMHG

## 2022-03-18 DIAGNOSIS — K52.9 GASTROENTERITIS: Primary | ICD-10-CM

## 2022-03-18 DIAGNOSIS — L03.119 CELLULITIS OF LOWER EXTREMITY, UNSPECIFIED LATERALITY: ICD-10-CM

## 2022-03-18 LAB
ABSOLUTE EOS #: 0 K/UL (ref 0–0.4)
ABSOLUTE LYMPH #: 0.9 K/UL (ref 1–4.8)
ABSOLUTE MONO #: 0.7 K/UL (ref 0.1–1.3)
ALBUMIN SERPL-MCNC: 3.7 G/DL (ref 3.5–5.2)
ALP BLD-CCNC: 58 U/L (ref 35–104)
ALT SERPL-CCNC: 9 U/L (ref 5–33)
ANION GAP SERPL CALCULATED.3IONS-SCNC: 16 MMOL/L (ref 9–17)
AST SERPL-CCNC: 14 U/L
BASOPHILS # BLD: 1 % (ref 0–2)
BASOPHILS ABSOLUTE: 0.1 K/UL (ref 0–0.2)
BILIRUB SERPL-MCNC: 1.02 MG/DL (ref 0.3–1.2)
BUN BLDV-MCNC: 20 MG/DL (ref 8–23)
CALCIUM SERPL-MCNC: 8 MG/DL (ref 8.6–10.4)
CHLORIDE BLD-SCNC: 100 MMOL/L (ref 98–107)
CO2: 24 MMOL/L (ref 20–31)
CREAT SERPL-MCNC: 1.82 MG/DL (ref 0.5–0.9)
EOSINOPHILS RELATIVE PERCENT: 0 % (ref 0–4)
GFR AFRICAN AMERICAN: 33 ML/MIN
GFR NON-AFRICAN AMERICAN: 27 ML/MIN
GFR SERPL CREATININE-BSD FRML MDRD: ABNORMAL ML/MIN/{1.73_M2}
GLUCOSE BLD-MCNC: 152 MG/DL (ref 70–99)
HCT VFR BLD CALC: 31.6 % (ref 36–46)
HEMOGLOBIN: 10.2 G/DL (ref 12–16)
INFLUENZA A: NOT DETECTED
INFLUENZA B: NOT DETECTED
LIPASE: 24 U/L (ref 13–60)
LYMPHOCYTES # BLD: 10 % (ref 24–44)
MCH RBC QN AUTO: 28.4 PG (ref 26–34)
MCHC RBC AUTO-ENTMCNC: 32.1 G/DL (ref 31–37)
MCV RBC AUTO: 88.5 FL (ref 80–100)
MONOCYTES # BLD: 8 % (ref 1–7)
PDW BLD-RTO: 18.7 % (ref 11.5–14.9)
PLATELET # BLD: 191 K/UL (ref 150–450)
PMV BLD AUTO: 8 FL (ref 6–12)
POTASSIUM SERPL-SCNC: 3.5 MMOL/L (ref 3.7–5.3)
RBC # BLD: 3.57 M/UL (ref 4–5.2)
SARS-COV-2 RNA, RT PCR: NOT DETECTED
SEG NEUTROPHILS: 81 % (ref 36–66)
SEGMENTED NEUTROPHILS ABSOLUTE COUNT: 7.2 K/UL (ref 1.3–9.1)
SODIUM BLD-SCNC: 140 MMOL/L (ref 135–144)
SOURCE: NORMAL
SPECIMEN DESCRIPTION: NORMAL
TOTAL PROTEIN: 6.3 G/DL (ref 6.4–8.3)
WBC # BLD: 8.9 K/UL (ref 3.5–11)

## 2022-03-18 PROCEDURE — 36415 COLL VENOUS BLD VENIPUNCTURE: CPT

## 2022-03-18 PROCEDURE — 74176 CT ABD & PELVIS W/O CONTRAST: CPT

## 2022-03-18 PROCEDURE — 96374 THER/PROPH/DIAG INJ IV PUSH: CPT

## 2022-03-18 PROCEDURE — 96361 HYDRATE IV INFUSION ADD-ON: CPT

## 2022-03-18 PROCEDURE — 85025 COMPLETE CBC W/AUTO DIFF WBC: CPT

## 2022-03-18 PROCEDURE — 99285 EMERGENCY DEPT VISIT HI MDM: CPT

## 2022-03-18 PROCEDURE — 87636 SARSCOV2 & INF A&B AMP PRB: CPT

## 2022-03-18 PROCEDURE — 6360000002 HC RX W HCPCS: Performed by: EMERGENCY MEDICINE

## 2022-03-18 PROCEDURE — 80053 COMPREHEN METABOLIC PANEL: CPT

## 2022-03-18 PROCEDURE — 83690 ASSAY OF LIPASE: CPT

## 2022-03-18 PROCEDURE — 6370000000 HC RX 637 (ALT 250 FOR IP): Performed by: EMERGENCY MEDICINE

## 2022-03-18 PROCEDURE — 2580000003 HC RX 258: Performed by: EMERGENCY MEDICINE

## 2022-03-18 RX ORDER — DOXYCYCLINE 100 MG/1
100 CAPSULE ORAL ONCE
Status: COMPLETED | OUTPATIENT
Start: 2022-03-18 | End: 2022-03-18

## 2022-03-18 RX ORDER — DOXYCYCLINE 100 MG/1
100 TABLET ORAL 2 TIMES DAILY
Qty: 20 TABLET | Refills: 0 | Status: ON HOLD | OUTPATIENT
Start: 2022-03-18 | End: 2022-03-23 | Stop reason: HOSPADM

## 2022-03-18 RX ORDER — ONDANSETRON 4 MG/1
4 TABLET, ORALLY DISINTEGRATING ORAL EVERY 8 HOURS PRN
Qty: 20 TABLET | Refills: 1 | Status: SHIPPED | OUTPATIENT
Start: 2022-03-18

## 2022-03-18 RX ORDER — ONDANSETRON 2 MG/ML
4 INJECTION INTRAMUSCULAR; INTRAVENOUS ONCE
Status: COMPLETED | OUTPATIENT
Start: 2022-03-18 | End: 2022-03-18

## 2022-03-18 RX ORDER — 0.9 % SODIUM CHLORIDE 0.9 %
1000 INTRAVENOUS SOLUTION INTRAVENOUS ONCE
Status: DISCONTINUED | OUTPATIENT
Start: 2022-03-18 | End: 2022-03-18

## 2022-03-18 RX ORDER — 0.9 % SODIUM CHLORIDE 0.9 %
500 INTRAVENOUS SOLUTION INTRAVENOUS ONCE
Status: COMPLETED | OUTPATIENT
Start: 2022-03-18 | End: 2022-03-18

## 2022-03-18 RX ADMIN — ONDANSETRON 4 MG: 2 INJECTION INTRAMUSCULAR; INTRAVENOUS at 01:09

## 2022-03-18 RX ADMIN — DOXYCYCLINE 100 MG: 100 CAPSULE ORAL at 02:54

## 2022-03-18 RX ADMIN — SODIUM CHLORIDE 500 ML: 9 INJECTION, SOLUTION INTRAVENOUS at 01:08

## 2022-03-18 ASSESSMENT — ENCOUNTER SYMPTOMS
TROUBLE SWALLOWING: 0
ABDOMINAL PAIN: 1
SHORTNESS OF BREATH: 0
DIARRHEA: 1
CONSTIPATION: 0
BACK PAIN: 0
SORE THROAT: 0
BLOOD IN STOOL: 0
COUGH: 0
COLOR CHANGE: 0
NAUSEA: 1
VOMITING: 1

## 2022-03-18 NOTE — ED PROVIDER NOTES
Diagnosis Date    Aneurysm, cerebral 2017    Arthritis     gout    Breast cancer ([facility] [address]     left-lumpectomy followed by chemo and radiation    Diabetes mellitus ([facility] [address]     not any more, ACTOS discontinued     H/O transfusion     2 Units    Hyperlipidemia     ON RX    Hypertension     ON RX    Porphyria ([facility] [address]     Wears glasses          SURGICAL HISTORY      has a past surgical history that includes Hysterectomy (); Breast lumpectomy (Left, ); other surgical history (2017); other surgical history (Left, 2017); and Colonoscopy (N/A, 2019). CURRENT MEDICATIONS       Previous Medications    ACETAMINOPHEN (TYLENOL) 500 MG TABLET    Take 1,000 mg by mouth every 6 hours as needed for Pain    ALLOPURINOL (ZYLOPRIM) 300 MG TABLET    Take 300 mg by mouth daily    ASPIRIN 325 MG TABLET    Take 325 mg by mouth daily     LISINOPRIL-HYDROCHLOROTHIAZIDE (PRINZIDE;ZESTORETIC) 20-12.5 MG PER TABLET    Take 2 tablets by mouth daily    OXYBUTYNIN (DITROPAN-XL) 10 MG EXTENDED RELEASE TABLET    TAKE ONE TABLET BY MOUTH DAILY    PRAVASTATIN (PRAVACHOL) 40 MG TABLET    Take 40 mg by mouth nightly    PREDNISONE (DELTASONE) 10 MG TABLET    Take 10 mg by mouth daily       ALLERGIES     is allergic to dicloxacillin, pcn [penicillins], and sulfa antibiotics. FAMILY HISTORY     She indicated that her mother is . She indicated that her father is . She indicated that both of her brothers are . She indicated that her maternal grandmother is . She indicated that her maternal grandfather is . She indicated that her paternal grandmother is . She indicated that her paternal grandfather is . She indicated that her son is alive.      family history includes Arthritis in her son; [place] in her brother; Diabetes in her paternal grandmother; Heart Attack in her brother; Heart Disease in her mother; High Blood Pressure in her son; Kidney Disease in her mother; No Known Problems in her maternal grandfather, maternal grandmother, and paternal grandfather; Other in her father. SOCIAL HISTORY      reports that she quit smoking about 17 years ago. Her smoking use included cigarettes. She started smoking about 59 years ago. She smoked 1.00 pack per day. She has never used smokeless tobacco. She reports that she does not drink alcohol and does not use drugs. PHYSICAL EXAM     INITIAL VITALS:  height is 5' 8\" (1.727 m) and weight is 280 lb (127 kg). Her blood pressure is 115/68 and her pulse is 100. Her respiration is 22 and oxygen saturation is 95%. Physical Exam  Vitals and nursing note reviewed. Constitutional:       General: She is not in acute distress. HENT:      Head: Normocephalic and atraumatic. Eyes:      Conjunctiva/sclera: Conjunctivae normal.      Pupils: Pupils are equal, round, and reactive to light. Cardiovascular:      Rate and Rhythm: Normal rate and regular rhythm. Heart sounds: Normal heart sounds. No murmur heard. Pulmonary:      Effort: Pulmonary effort is normal. No respiratory distress. Breath sounds: Normal breath sounds. Abdominal:      General: Bowel sounds are normal. There is no distension. Palpations: Abdomen is soft. Tenderness: There is no abdominal tenderness. Musculoskeletal:         General: No tenderness. Cervical back: Neck supple. Right lower leg: Edema present. Left lower leg: Edema present. Lymphadenopathy:      Cervical: No cervical adenopathy. Skin:     General: Skin is warm and dry. Findings: Erythema (Bilateral lower extremities) present. No rash. Neurological:      Mental Status: She is alert and oriented to person, place, and time. Psychiatric:         Judgment: Judgment normal.           DIFFERENTIAL DIAGNOSIS/MDM:   79-year-old female presents with abdominal pain, nausea, vomiting and diarrhea since Monday.   She is afebrile, nontoxic, normal vital signs. No acute distress. She does have lower extremity edema with some erythema and chronic-looking skin changes. Unclear if this is a acute cellulitis or chronic lower extremity skin changes. Other differential includes dehydration, metabolic abnormality, gastroenteritis, colitis, diverticulitis, less likely bowel obstruction, perforation. We will get labs, CT abdomen pelvis. Will test for COVID-19. DIAGNOSTIC RESULTS     EKG: All EKG's are interpreted by the Emergency Department Physician who either signs or Co-signs this chart in the absence of a cardiologist.        RADIOLOGY:   I directly visualized the following  images and reviewed the radiologist interpretations:  CT ABDOMEN PELVIS WO CONTRAST Additional Contrast? None   Preliminary Result   1. No acute findings in the abdomen or pelvis to account for the patient's   pain. 2. The colonic diverticulosis. 3. The previously noted urethral diverticulum is less conspicuous. 4. Indeterminate proteinaceous cyst in the left kidney with largest in the   lower pole measuring 1.3 cm. Dedicated renal CT or MRI could be performed   for further evaluation.                  ED BEDSIDE ULTRASOUND:      LABS:  Labs Reviewed   CBC WITH AUTO DIFFERENTIAL - Abnormal; Notable for the following components:       Result Value    RBC 3.57 (*)     Hemoglobin 10.2 (*)     Hematocrit 31.6 (*)     RDW 18.7 (*)     Seg Neutrophils 81 (*)     Lymphocytes 10 (*)     Monocytes 8 (*)     Absolute Lymph # 0.90 (*)     All other components within normal limits   COMPREHENSIVE METABOLIC PANEL - Abnormal; Notable for the following components:    Glucose 152 (*)     CREATININE 1.82 (*)     Calcium 8.0 (*)     Potassium 3.5 (*)     Total Protein 6.3 (*)     GFR Non- 27 (*)     GFR  33 (*)     All other components within normal limits   COVID-19 & INFLUENZA COMBO   LIPASE   URINALYSIS WITH REFLEX TO CULTURE         EMERGENCY DEPARTMENT COURSE:   Vitals:    Vitals:    03/18/22 0039   BP: 115/68   Pulse: 100   Resp: 22   TempSrc: Oral   SpO2: 95%   Weight: 280 lb (127 kg)   Height: 5' 8\" (1.727 m)     2:46 AM EDT  CT abdomen pelvis is unremarkable. Patient feels significantly improved after IV fluids, antiemetics here. Patient most likely has a gastroenteritis, some mild dehydration. Creatinine was slightly elevated at 1.8. Offered patient hospital admission however she is declining. We will treat her lower extremities as a cellulitis with antibiotics. Will discharge home with antiemetics. She was advised to follow-up with PCP, keep her self hydrated at home, return if any symptoms worsen. She is agreeable plan will be discharged home today. CRITICALCARE:      CONSULTS:  None      PROCEDURES:      FINAL IMPRESSION      1. Gastroenteritis    2. Cellulitis of lower extremity, unspecified laterality            DISPOSITION/PLAN   DISPOSITION Discharge - Pending Orders Complete 03/18/2022 02:44:24 AM          PATIENT REFERRED TO:  Taisha Peng  [address] [place] [address] [address] [address]    Schedule an appointment as soon as possible for a visit       [facility] ED  Critical access hospital 469  [phone]    As needed, If symptoms worsen      DISCHARGE MEDICATIONS:  New Prescriptions    DOXYCYCLINE MONOHYDRATE (ADOXA) 100 MG TABLET    Take 1 tablet by mouth 2 times daily for 10 days    ONDANSETRON (ZOFRAN ODT) 4 MG DISINTEGRATING TABLET    Take 1 tablet by mouth every 8 hours as needed for Nausea       The care is provided during an unprecedented national emergency due to the novel coronavirus, COVID-19.     (Please note that portions ofthis note were completed with a voice recognition program.  Efforts were made to edit the dictations but occasionally words are mis-transcribed.)    Tierra Davis DO  Attending Emergency Physician          Tierra Davis DO  03/18/22 9228

## 2022-03-19 ENCOUNTER — APPOINTMENT (OUTPATIENT)
Dept: MRI IMAGING | Age: 77
DRG: 641 | End: 2022-03-19
Payer: MEDICARE

## 2022-03-19 ENCOUNTER — APPOINTMENT (OUTPATIENT)
Dept: CT IMAGING | Age: 77
DRG: 641 | End: 2022-03-19
Payer: MEDICARE

## 2022-03-19 ENCOUNTER — HOSPITAL ENCOUNTER (INPATIENT)
Age: 77
LOS: 4 days | Discharge: HOME | DRG: 641 | End: 2022-03-23
Attending: EMERGENCY MEDICINE | Admitting: INTERNAL MEDICINE
Payer: MEDICARE

## 2022-03-19 DIAGNOSIS — R51.9 ACUTE INTRACTABLE HEADACHE, UNSPECIFIED HEADACHE TYPE: Primary | ICD-10-CM

## 2022-03-19 DIAGNOSIS — R42 DIZZINESS: ICD-10-CM

## 2022-03-19 DIAGNOSIS — E83.42 HYPOMAGNESEMIA SYNDROME: ICD-10-CM

## 2022-03-19 DIAGNOSIS — N18.9 CHRONIC KIDNEY DISEASE, UNSPECIFIED CKD STAGE: ICD-10-CM

## 2022-03-19 DIAGNOSIS — I48.91 ATRIAL FIBRILLATION WITH RVR (HCC): ICD-10-CM

## 2022-03-19 DIAGNOSIS — E87.6 HYPOKALEMIA: ICD-10-CM

## 2022-03-19 LAB
ABSOLUTE EOS #: 0 K/UL (ref 0–0.4)
ABSOLUTE LYMPH #: 1.7 K/UL (ref 1–4.8)
ABSOLUTE MONO #: 0.6 K/UL (ref 0.1–1.3)
ALBUMIN SERPL-MCNC: 3.6 G/DL (ref 3.5–5.2)
ALP BLD-CCNC: 60 U/L (ref 35–104)
ALT SERPL-CCNC: 9 U/L (ref 5–33)
ANION GAP SERPL CALCULATED.3IONS-SCNC: 16 MMOL/L (ref 9–17)
AST SERPL-CCNC: 15 U/L
BASOPHILS # BLD: 0 % (ref 0–2)
BASOPHILS ABSOLUTE: 0 K/UL (ref 0–0.2)
BILIRUB SERPL-MCNC: 0.78 MG/DL (ref 0.3–1.2)
BUN BLDV-MCNC: 20 MG/DL (ref 8–23)
CALCIUM SERPL-MCNC: 7.6 MG/DL (ref 8.6–10.4)
CHLORIDE BLD-SCNC: 100 MMOL/L (ref 98–107)
CO2: 23 MMOL/L (ref 20–31)
CREAT SERPL-MCNC: 2.24 MG/DL (ref 0.5–0.9)
EOSINOPHILS RELATIVE PERCENT: 0 % (ref 0–4)
GFR AFRICAN AMERICAN: 26 ML/MIN
GFR NON-AFRICAN AMERICAN: 21 ML/MIN
GFR SERPL CREATININE-BSD FRML MDRD: ABNORMAL ML/MIN/{1.73_M2}
GLUCOSE BLD-MCNC: 120 MG/DL (ref 70–99)
HCT VFR BLD CALC: 31.2 % (ref 36–46)
HEMOGLOBIN: 10 G/DL (ref 12–16)
INR BLD: 2.8
LYMPHOCYTES # BLD: 23 % (ref 24–44)
MAGNESIUM: 1 MG/DL (ref 1.6–2.6)
MCH RBC QN AUTO: 28.4 PG (ref 26–34)
MCHC RBC AUTO-ENTMCNC: 31.9 G/DL (ref 31–37)
MCV RBC AUTO: 88.9 FL (ref 80–100)
MONOCYTES # BLD: 8 % (ref 1–7)
PDW BLD-RTO: 18.6 % (ref 11.5–14.9)
PLATELET # BLD: 201 K/UL (ref 150–450)
PMV BLD AUTO: 8.1 FL (ref 6–12)
POTASSIUM SERPL-SCNC: 3.3 MMOL/L (ref 3.7–5.3)
PROTHROMBIN TIME: 29.7 SEC (ref 11.8–14.6)
RBC # BLD: 3.51 M/UL (ref 4–5.2)
SEG NEUTROPHILS: 69 % (ref 36–66)
SEGMENTED NEUTROPHILS ABSOLUTE COUNT: 5.3 K/UL (ref 1.3–9.1)
SODIUM BLD-SCNC: 139 MMOL/L (ref 135–144)
TOTAL PROTEIN: 6.3 G/DL (ref 6.4–8.3)
TROPONIN, HIGH SENSITIVITY: 24 NG/L (ref 0–14)
TROPONIN, HIGH SENSITIVITY: 27 NG/L (ref 0–14)
WBC # BLD: 7.6 K/UL (ref 3.5–11)

## 2022-03-19 PROCEDURE — 2500000003 HC RX 250 WO HCPCS: Performed by: EMERGENCY MEDICINE

## 2022-03-19 PROCEDURE — 85610 PROTHROMBIN TIME: CPT

## 2022-03-19 PROCEDURE — 2060000000 HC ICU INTERMEDIATE R&B

## 2022-03-19 PROCEDURE — 6360000002 HC RX W HCPCS: Performed by: EMERGENCY MEDICINE

## 2022-03-19 PROCEDURE — 80053 COMPREHEN METABOLIC PANEL: CPT

## 2022-03-19 PROCEDURE — 96375 TX/PRO/DX INJ NEW DRUG ADDON: CPT

## 2022-03-19 PROCEDURE — 93005 ELECTROCARDIOGRAM TRACING: CPT | Performed by: EMERGENCY MEDICINE

## 2022-03-19 PROCEDURE — 99285 EMERGENCY DEPT VISIT HI MDM: CPT

## 2022-03-19 PROCEDURE — 70551 MRI BRAIN STEM W/O DYE: CPT

## 2022-03-19 PROCEDURE — 70450 CT HEAD/BRAIN W/O DYE: CPT

## 2022-03-19 PROCEDURE — 96374 THER/PROPH/DIAG INJ IV PUSH: CPT

## 2022-03-19 PROCEDURE — 6370000000 HC RX 637 (ALT 250 FOR IP): Performed by: EMERGENCY MEDICINE

## 2022-03-19 PROCEDURE — 84484 ASSAY OF TROPONIN QUANT: CPT

## 2022-03-19 PROCEDURE — 85025 COMPLETE CBC W/AUTO DIFF WBC: CPT

## 2022-03-19 PROCEDURE — 83735 ASSAY OF MAGNESIUM: CPT

## 2022-03-19 PROCEDURE — 6360000002 HC RX W HCPCS: Performed by: INTERNAL MEDICINE

## 2022-03-19 PROCEDURE — 2580000003 HC RX 258: Performed by: INTERNAL MEDICINE

## 2022-03-19 PROCEDURE — 6370000000 HC RX 637 (ALT 250 FOR IP): Performed by: INTERNAL MEDICINE

## 2022-03-19 PROCEDURE — 70544 MR ANGIOGRAPHY HEAD W/O DYE: CPT

## 2022-03-19 PROCEDURE — 36415 COLL VENOUS BLD VENIPUNCTURE: CPT

## 2022-03-19 PROCEDURE — 2580000003 HC RX 258: Performed by: EMERGENCY MEDICINE

## 2022-03-19 RX ORDER — HYDROCODONE BITARTRATE AND ACETAMINOPHEN 5; 325 MG/1; MG/1
1 TABLET ORAL EVERY 6 HOURS PRN
Status: DISCONTINUED | OUTPATIENT
Start: 2022-03-19 | End: 2022-03-23 | Stop reason: HOSPADM

## 2022-03-19 RX ORDER — SODIUM CHLORIDE 0.9 % (FLUSH) 0.9 %
5-40 SYRINGE (ML) INJECTION EVERY 12 HOURS SCHEDULED
Status: DISCONTINUED | OUTPATIENT
Start: 2022-03-19 | End: 2022-03-23 | Stop reason: HOSPADM

## 2022-03-19 RX ORDER — DILTIAZEM HYDROCHLORIDE 5 MG/ML
20 INJECTION INTRAVENOUS ONCE
Status: COMPLETED | OUTPATIENT
Start: 2022-03-19 | End: 2022-03-19

## 2022-03-19 RX ORDER — ONDANSETRON 2 MG/ML
4 INJECTION INTRAMUSCULAR; INTRAVENOUS EVERY 6 HOURS PRN
Status: DISCONTINUED | OUTPATIENT
Start: 2022-03-19 | End: 2022-03-20

## 2022-03-19 RX ORDER — OMEPRAZOLE 20 MG/1
20 CAPSULE, DELAYED RELEASE ORAL DAILY
COMMUNITY

## 2022-03-19 RX ORDER — ACETAMINOPHEN 325 MG/1
650 TABLET ORAL EVERY 4 HOURS PRN
Status: DISCONTINUED | OUTPATIENT
Start: 2022-03-19 | End: 2022-03-23 | Stop reason: HOSPADM

## 2022-03-19 RX ORDER — SODIUM CHLORIDE 9 MG/ML
25 INJECTION, SOLUTION INTRAVENOUS PRN
Status: DISCONTINUED | OUTPATIENT
Start: 2022-03-19 | End: 2022-03-23 | Stop reason: HOSPADM

## 2022-03-19 RX ORDER — SODIUM CHLORIDE 9 MG/ML
INJECTION, SOLUTION INTRAVENOUS CONTINUOUS
Status: DISCONTINUED | OUTPATIENT
Start: 2022-03-19 | End: 2022-03-23 | Stop reason: HOSPADM

## 2022-03-19 RX ORDER — WARFARIN SODIUM 3 MG/1
3 TABLET ORAL DAILY
COMMUNITY

## 2022-03-19 RX ORDER — ONDANSETRON 2 MG/ML
8 INJECTION INTRAMUSCULAR; INTRAVENOUS ONCE
Status: COMPLETED | OUTPATIENT
Start: 2022-03-19 | End: 2022-03-19

## 2022-03-19 RX ORDER — METOPROLOL TARTRATE 50 MG/1
50 TABLET, FILM COATED ORAL 2 TIMES DAILY
COMMUNITY

## 2022-03-19 RX ORDER — ONDANSETRON 4 MG/1
4 TABLET, ORALLY DISINTEGRATING ORAL EVERY 8 HOURS PRN
Status: DISCONTINUED | OUTPATIENT
Start: 2022-03-19 | End: 2022-03-20

## 2022-03-19 RX ORDER — SODIUM CHLORIDE 0.9 % (FLUSH) 0.9 %
5-40 SYRINGE (ML) INJECTION PRN
Status: DISCONTINUED | OUTPATIENT
Start: 2022-03-19 | End: 2022-03-23 | Stop reason: HOSPADM

## 2022-03-19 RX ORDER — POTASSIUM CHLORIDE 20 MEQ/1
40 TABLET, EXTENDED RELEASE ORAL ONCE
Status: COMPLETED | OUTPATIENT
Start: 2022-03-19 | End: 2022-03-19

## 2022-03-19 RX ADMIN — ACETAMINOPHEN 650 MG: 325 TABLET ORAL at 20:52

## 2022-03-19 RX ADMIN — SODIUM CHLORIDE, PRESERVATIVE FREE 10 ML: 5 INJECTION INTRAVENOUS at 20:53

## 2022-03-19 RX ADMIN — DILTIAZEM HYDROCHLORIDE 20 MG: 5 INJECTION INTRAVENOUS at 11:55

## 2022-03-19 RX ADMIN — ONDANSETRON 8 MG: 2 INJECTION INTRAMUSCULAR; INTRAVENOUS at 11:32

## 2022-03-19 RX ADMIN — MAGNESIUM SULFATE HEPTAHYDRATE 2000 MG: 500 INJECTION, SOLUTION INTRAMUSCULAR; INTRAVENOUS at 12:39

## 2022-03-19 RX ADMIN — HYDROCODONE BITARTRATE AND ACETAMINOPHEN 1 TABLET: 5; 325 TABLET ORAL at 16:11

## 2022-03-19 RX ADMIN — SODIUM CHLORIDE: 9 INJECTION, SOLUTION INTRAVENOUS at 11:14

## 2022-03-19 RX ADMIN — SODIUM CHLORIDE: 9 INJECTION, SOLUTION INTRAVENOUS at 23:58

## 2022-03-19 RX ADMIN — ONDANSETRON 4 MG: 2 INJECTION INTRAMUSCULAR; INTRAVENOUS at 17:28

## 2022-03-19 RX ADMIN — POTASSIUM CHLORIDE 40 MEQ: 1500 TABLET, EXTENDED RELEASE ORAL at 12:34

## 2022-03-19 ASSESSMENT — PAIN SCALES - GENERAL
PAINLEVEL_OUTOF10: 10
PAINLEVEL_OUTOF10: 0
PAINLEVEL_OUTOF10: 0
PAINLEVEL_OUTOF10: 10
PAINLEVEL_OUTOF10: 10
PAINLEVEL_OUTOF10: 0

## 2022-03-19 ASSESSMENT — PAIN DESCRIPTION - PAIN TYPE
TYPE: ACUTE PAIN
TYPE: ACUTE PAIN

## 2022-03-19 ASSESSMENT — PAIN DESCRIPTION - LOCATION
LOCATION: HEAD
LOCATION: HEAD

## 2022-03-19 NOTE — ED PROVIDER NOTES
EMERGENCY DEPARTMENT ENCOUNTER    Pt Name: Jonathan Solis  MRN: [id number]  [place] 1945  Date of evaluation: 3/19/22  CHIEF COMPLAINT       Chief Complaint   Patient presents with    Headache    Dizziness     HISTORY OF PRESENT ILLNESS     Headache  Pain location:  Occipital  Radiates to:  Does not radiate  Onset quality:  Sudden  Duration:  2 days  Timing:  Constant  Progression:  Worsening  Chronicity:  New  Relieved by:  Nothing  Worsened by:  Nothing  Ineffective treatments:  None tried  Associated symptoms: dizziness            REVIEW OF SYSTEMS     Review of Systems   Neurological: Positive for dizziness and headaches. All other systems reviewed and are negative.     PASTMEDICAL HISTORY     Past Medical History:   Diagnosis Date    Aneurysm, cerebral 05/22/2017    Arthritis     gout    Breast cancer ([facility] [address] 2011    left-lumpectomy followed by chemo and radiation    Diabetes mellitus ([facility] [address]     not any more, ACTOS discontinued     H/O transfusion 2011    2 Units    Hyperlipidemia     ON RX    Hypertension     ON RX    Porphyria ([facility] [address]     Wears glasses      Past Problem List  Patient Active Problem List   Diagnosis Code    DDD (degenerative disc disease), lumbar M51.36    Lumbar stenosis with neurogenic claudication M48.062    Lumbago M54.50    Lumbar radiculopathy, chronic M54.16    Cerebral aneurysm without rupture I67.1    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    MRI-safe endovascular aneurysm coil present Z95.828    Morbid obesity with BMI of 40.0-44.9, adult ([address] E66.01, Z68.41    Aneurysm ([facility] [address] I72.9    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    Hypomagnesemia E83.42     SURGICAL HISTORY       Past Surgical History:   Procedure Laterality Date    BREAST LUMPECTOMY Left 2011    Chemo & Radiation    COLONOSCOPY N/A 5/24/2019    COLONOSCOPY POLYPECTOMY HOT BIOPSY performed by Kerrie [facility] at [facility] 2017    CEREBRA EMBO COILING WITH STENT    OTHER SURGICAL HISTORY Left 2017    coil embolization     CURRENT MEDICATIONS       Previous Medications    ACETAMINOPHEN (TYLENOL) 500 MG TABLET    Take 1,000 mg by mouth every 6 hours as needed for Pain    ALLOPURINOL (ZYLOPRIM) 300 MG TABLET    Take 300 mg by mouth daily    ASPIRIN 325 MG TABLET    Take 325 mg by mouth daily     DOXYCYCLINE MONOHYDRATE (ADOXA) 100 MG TABLET    Take 1 tablet by mouth 2 times daily for 10 days    LISINOPRIL-HYDROCHLOROTHIAZIDE (PRINZIDE;ZESTORETIC) 20-12.5 MG PER TABLET    Take 2 tablets by mouth daily    ONDANSETRON (ZOFRAN ODT) 4 MG DISINTEGRATING TABLET    Take 1 tablet by mouth every 8 hours as needed for Nausea    OXYBUTYNIN (DITROPAN-XL) 10 MG EXTENDED RELEASE TABLET    TAKE ONE TABLET BY MOUTH DAILY    PRAVASTATIN (PRAVACHOL) 40 MG TABLET    Take 40 mg by mouth nightly    PREDNISONE (DELTASONE) 10 MG TABLET    Take 10 mg by mouth daily    WARFARIN (COUMADIN) 3 MG TABLET    Take 3 mg by mouth daily     ALLERGIES     is allergic to dicloxacillin, pcn [penicillins], and sulfa antibiotics. FAMILY HISTORY     She indicated that her mother is . She indicated that her father is . She indicated that both of her brothers are . She indicated that her maternal grandmother is . She indicated that her maternal grandfather is . She indicated that her paternal grandmother is . She indicated that her paternal grandfather is . She indicated that her son is alive.      SOCIAL HISTORY       Social History     Tobacco Use    Smoking status: Former Smoker     Packs/day: 1.00     Types: Cigarettes     Start date:      Quit date: 2004     Years since quittin.4    Smokeless tobacco: Never Used   Vaping Use    Vaping Use: Never used   Substance Use Topics    Alcohol use: No    Drug use: No     PHYSICAL EXAM     INITIAL VITALS: /87   Pulse 110   Temp 97.8 °F (36.6 °C)   Resp 20   LMP 12/20/2011 (Approximate)   SpO2 97%    Physical Exam  Constitutional:       General: She is not in acute distress. Appearance: Normal appearance. She is well-developed. She is not diaphoretic. HENT:      Head: Normocephalic and atraumatic. Right Ear: External ear normal.      Left Ear: External ear normal.      Nose: Nose normal. No congestion. Mouth/Throat:      Mouth: Mucous membranes are moist.      Pharynx: Oropharynx is clear. Eyes:      General:         Right eye: No discharge. Left eye: No discharge. Conjunctiva/sclera: Conjunctivae normal.      Pupils: Pupils are equal, round, and reactive to light. Neck:      Trachea: No tracheal deviation. Cardiovascular:      Rate and Rhythm: Normal rate and regular rhythm. Pulses: Normal pulses. Heart sounds: Normal heart sounds. Pulmonary:      Effort: Pulmonary effort is normal. No respiratory distress. Breath sounds: Normal breath sounds. No stridor. No wheezing or rales. Abdominal:      Palpations: Abdomen is soft. Tenderness: There is no abdominal tenderness. There is no guarding or rebound. Musculoskeletal:         General: No tenderness or deformity. Normal range of motion. Cervical back: Normal range of motion and neck supple. Skin:     General: Skin is warm and dry. Capillary Refill: Capillary refill takes less than 2 seconds. Findings: No erythema or rash. Neurological:      General: No focal deficit present. Mental Status: She is alert and oriented to person, place, and time.       Coordination: Coordination normal.      Comments: GCS 15  Strength in arms 5/5  Strength in legs 5/5  Sensation intact bl arms and legs  No facial droop  Speech is clear, no dysarthria or aphasia  No ataxia in arms or legs  No gaze preference or nystagums  Visual fields intact  NIHSS 0   Psychiatric:         Mood and Affect: Mood normal.         Behavior: Behavior normal. Thought Content: Thought content normal.         Judgment: Judgment normal.         MEDICAL DECISION MAKING:       ED Course as of 03/19/22 1300   Sat Mar 19, 2022   1134 Starting cardizem for afib with rvr  Ordering MRI and MRA brain for her headache, hx of aneurysm and stent [WM]   1135 INR 2.8, so avoiding LP [WM]   1144 Replacing MG and K [WM]   18 DW Dr Cesar Benites for admission, she will follow up MRI MRA brain, accepts admit to pcu [WM]   1253 HR improved  Going to MRI now [WM]      ED Course User Index  [WM] Dez Cervantes MD       Procedures    DIAGNOSTIC RESULTS   EKG:All EKG's are interpreted by the Emergency Department Physician who either signs or Co-signs this chart in the absence of a cardiologist.  Atrial fibrillation with rvr, rate 11 bpm, no acute dm elev or dep, some artifact present      RADIOLOGY:All plain film, CT, MRI, and formal ultrasound images (except ED bedside ultrasound) are read by the radiologist, see reports below, unless otherwisenoted in MDM or here. CT HEAD WO CONTRAST   Preliminary Result   No acute intracranial abnormality. Redemonstration of metallic coils in the   left cavernous area. Redemonstration of left internal carotid artery stent   in this area. Redemonstration of atrophy and small-vessel disease ischemic   changes. MRI BRAIN WO CONTRAST    (Results Pending)   MRA HEAD WO CONTRAST    (Results Pending)     LABS: All lab results were reviewed by myself, and all abnormals are listed below.   Labs Reviewed   CBC WITH AUTO DIFFERENTIAL - Abnormal; Notable for the following components:       Result Value    RBC 3.51 (*)     Hemoglobin 10.0 (*)     Hematocrit 31.2 (*)     RDW 18.6 (*)     Seg Neutrophils 69 (*)     Lymphocytes 23 (*)     Monocytes 8 (*)     All other components within normal limits   COMPREHENSIVE METABOLIC PANEL - Abnormal; Notable for the following components:    Glucose 120 (*)     CREATININE 2.24 (*)     Calcium 7.6 (*)     Potassium 3.3 (*) Total Protein 6.3 (*)     GFR Non- 21 (*)     GFR  26 (*)     All other components within normal limits   MAGNESIUM - Abnormal; Notable for the following components:    Magnesium 1.0 (*)     All other components within normal limits   PROTIME-INR - Abnormal; Notable for the following components:    Protime 29.7 (*)     All other components within normal limits   TROPONIN - Abnormal; Notable for the following components:    Troponin, High Sensitivity 27 (*)     All other components within normal limits   TROPONIN       EMERGENCY DEPARTMENTCOURSE:         Vitals:    Vitals:    03/19/22 1033   BP: 121/87   Pulse: 110   Resp: 20   Temp: 97.8 °F (36.6 °C)   SpO2: 97%       The patient was given the following medications while in the emergency department:  Orders Placed This Encounter   Medications    0.9 % sodium chloride infusion    ondansetron (ZOFRAN) injection 8 mg    dilTIAZem injection 20 mg    magnesium sulfate 2,000 mg in dextrose 5 % 100 mL IVPB    potassium chloride (KLOR-CON M) extended release tablet 40 mEq     CONSULTS:  IP CONSULT TO FAMILY MEDICINE    FINAL IMPRESSION      1. Acute intractable headache, unspecified headache type    2. Dizziness    3. Atrial fibrillation with RVR ([address]    4. Hypomagnesemia syndrome    5. Hypokalemia    6. Chronic kidney disease, unspecified CKD stage          DISPOSITION/PLAN   DISPOSITION        PATIENT REFERRED TO:  No follow-up provider specified. DISCHARGE MEDICATIONS:  New Prescriptions    No medications on file     The care is provided during an unprecedented national emergency due to the novel coronavirus, COVID 19.   MD Cherelle Winslow MD  03/19/22 1300

## 2022-03-19 NOTE — PLAN OF CARE
Problem: Pain:  Goal: Pain level will decrease  Description: Pain level will decrease  3/19/2022 1834 by Sierra Brown RN  Outcome: Ongoing  3/19/2022 1832 by Sierra Brown RN  Outcome: Ongoing  Goal: Control of acute pain  Description: Control of acute pain  3/19/2022 1834 by Sierra Brown RN  Outcome: Ongoing  3/19/2022 1832 by Sierra Brown RN  Outcome: Ongoing  Goal: Control of chronic pain  Description: Control of chronic pain  3/19/2022 1834 by Sierra Brown RN  Outcome: Ongoing  3/19/2022 1832 by Sierra Brown RN  Outcome: Ongoing     Problem: Skin Integrity:  Goal: Will show no infection signs and symptoms  Description: Will show no infection signs and symptoms  3/19/2022 1834 by Sierra Brown RN  Outcome: Ongoing  3/19/2022 1832 by Sierra Brown RN  Outcome: Ongoing  Goal: Absence of new skin breakdown  Description: Absence of new skin breakdown  3/19/2022 1834 by Sierra Brown RN  Outcome: Ongoing  3/19/2022 1832 by Sierra Brown RN  Outcome: Ongoing     Problem: Fluid Volume:  Goal: Ability to achieve a balanced intake and output will improve  Description: Ability to achieve a balanced intake and output will improve  3/19/2022 1834 by Sierra Brown RN  Outcome: Ongoing  3/19/2022 1832 by Sierra Brown RN  Outcome: Ongoing     Problem: Physical Regulation:  Goal: Ability to maintain clinical measurements within normal limits will improve  Description: Ability to maintain clinical measurements within normal limits will improve  3/19/2022 1834 by Sierra Brown RN  Outcome: Ongoing  3/19/2022 1832 by Sierra Brown RN  Outcome: Ongoing  Goal: Will show no signs and symptoms of electrolyte imbalance  Description: Will show no signs and symptoms of electrolyte imbalance  3/19/2022 1834 by Sierra Brown RN  Outcome: Ongoing  3/19/2022 1832 by Sierra Brown RN  Outcome: Ongoing

## 2022-03-19 NOTE — PROGRESS NOTES
Pt arrived to unit around 1600, stable, 10/10 headache. Physician notified of pain, norco ordered. Zofran given x1 for nausea.

## 2022-03-20 LAB
ABSOLUTE EOS #: 0 K/UL (ref 0–0.4)
ABSOLUTE LYMPH #: 0.9 K/UL (ref 1–4.8)
ABSOLUTE MONO #: 0.4 K/UL (ref 0.1–1.3)
ANION GAP SERPL CALCULATED.3IONS-SCNC: 15 MMOL/L (ref 9–17)
BASOPHILS # BLD: 0 % (ref 0–2)
BASOPHILS ABSOLUTE: 0 K/UL (ref 0–0.2)
BUN BLDV-MCNC: 18 MG/DL (ref 8–23)
C-REACTIVE PROTEIN: 79.5 MG/L (ref 0–5)
CALCIUM SERPL-MCNC: 7.4 MG/DL (ref 8.6–10.4)
CHLORIDE BLD-SCNC: 104 MMOL/L (ref 98–107)
CO2: 20 MMOL/L (ref 20–31)
CREAT SERPL-MCNC: 1.85 MG/DL (ref 0.5–0.9)
EOSINOPHILS RELATIVE PERCENT: 0 % (ref 0–4)
GFR AFRICAN AMERICAN: 32 ML/MIN
GFR NON-AFRICAN AMERICAN: 27 ML/MIN
GFR SERPL CREATININE-BSD FRML MDRD: ABNORMAL ML/MIN/{1.73_M2}
GLUCOSE BLD-MCNC: 127 MG/DL (ref 70–99)
HCT VFR BLD CALC: 30.3 % (ref 36–46)
HEMOGLOBIN: 9.9 G/DL (ref 12–16)
LYMPHOCYTES # BLD: 18 % (ref 24–44)
MAGNESIUM: 1.3 MG/DL (ref 1.6–2.6)
MAGNESIUM: 2.1 MG/DL (ref 1.6–2.6)
MCH RBC QN AUTO: 28.9 PG (ref 26–34)
MCHC RBC AUTO-ENTMCNC: 32.6 G/DL (ref 31–37)
MCV RBC AUTO: 88.7 FL (ref 80–100)
MONOCYTES # BLD: 8 % (ref 1–7)
PDW BLD-RTO: 19 % (ref 11.5–14.9)
PLATELET # BLD: 168 K/UL (ref 150–450)
PMV BLD AUTO: 8.2 FL (ref 6–12)
POTASSIUM SERPL-SCNC: 3.3 MMOL/L (ref 3.7–5.3)
POTASSIUM SERPL-SCNC: 3.8 MMOL/L (ref 3.7–5.3)
RBC # BLD: 3.41 M/UL (ref 4–5.2)
SEDIMENTATION RATE, ERYTHROCYTE: 52 MM/HR (ref 0–30)
SEG NEUTROPHILS: 74 % (ref 36–66)
SEGMENTED NEUTROPHILS ABSOLUTE COUNT: 3.9 K/UL (ref 1.3–9.1)
SODIUM BLD-SCNC: 139 MMOL/L (ref 135–144)
WBC # BLD: 5.3 K/UL (ref 3.5–11)

## 2022-03-20 PROCEDURE — 6360000002 HC RX W HCPCS: Performed by: INTERNAL MEDICINE

## 2022-03-20 PROCEDURE — 85652 RBC SED RATE AUTOMATED: CPT

## 2022-03-20 PROCEDURE — 36415 COLL VENOUS BLD VENIPUNCTURE: CPT

## 2022-03-20 PROCEDURE — 83735 ASSAY OF MAGNESIUM: CPT

## 2022-03-20 PROCEDURE — 6370000000 HC RX 637 (ALT 250 FOR IP): Performed by: FAMILY MEDICINE

## 2022-03-20 PROCEDURE — 86140 C-REACTIVE PROTEIN: CPT

## 2022-03-20 PROCEDURE — 6360000002 HC RX W HCPCS: Performed by: FAMILY MEDICINE

## 2022-03-20 PROCEDURE — 87506 IADNA-DNA/RNA PROBE TQ 6-11: CPT

## 2022-03-20 PROCEDURE — 2060000000 HC ICU INTERMEDIATE R&B

## 2022-03-20 PROCEDURE — 2500000003 HC RX 250 WO HCPCS: Performed by: FAMILY MEDICINE

## 2022-03-20 PROCEDURE — 84132 ASSAY OF SERUM POTASSIUM: CPT

## 2022-03-20 PROCEDURE — 2580000003 HC RX 258: Performed by: INTERNAL MEDICINE

## 2022-03-20 PROCEDURE — 85025 COMPLETE CBC W/AUTO DIFF WBC: CPT

## 2022-03-20 PROCEDURE — 80048 BASIC METABOLIC PNL TOTAL CA: CPT

## 2022-03-20 RX ORDER — POTASSIUM CHLORIDE 7.45 MG/ML
10 INJECTION INTRAVENOUS PRN
Status: DISCONTINUED | OUTPATIENT
Start: 2022-03-20 | End: 2022-03-23 | Stop reason: HOSPADM

## 2022-03-20 RX ORDER — CLINDAMYCIN PHOSPHATE 300 MG/50ML
300 INJECTION INTRAVENOUS EVERY 12 HOURS
Status: DISCONTINUED | OUTPATIENT
Start: 2022-03-20 | End: 2022-03-23 | Stop reason: HOSPADM

## 2022-03-20 RX ORDER — PANTOPRAZOLE SODIUM 40 MG/1
40 TABLET, DELAYED RELEASE ORAL
Status: DISCONTINUED | OUTPATIENT
Start: 2022-03-21 | End: 2022-03-23 | Stop reason: HOSPADM

## 2022-03-20 RX ORDER — ALLOPURINOL 300 MG/1
300 TABLET ORAL DAILY
Status: DISCONTINUED | OUTPATIENT
Start: 2022-03-21 | End: 2022-03-23 | Stop reason: HOSPADM

## 2022-03-20 RX ORDER — ONDANSETRON 4 MG/1
4 TABLET, ORALLY DISINTEGRATING ORAL EVERY 8 HOURS PRN
Status: DISCONTINUED | OUTPATIENT
Start: 2022-03-20 | End: 2022-03-23 | Stop reason: HOSPADM

## 2022-03-20 RX ORDER — LISINOPRIL AND HYDROCHLOROTHIAZIDE 20; 12.5 MG/1; MG/1
1 TABLET ORAL DAILY
Status: DISCONTINUED | OUTPATIENT
Start: 2022-03-20 | End: 2022-03-20 | Stop reason: RX

## 2022-03-20 RX ORDER — MAGNESIUM SULFATE 1 G/100ML
1000 INJECTION INTRAVENOUS PRN
Status: DISCONTINUED | OUTPATIENT
Start: 2022-03-20 | End: 2022-03-23 | Stop reason: HOSPADM

## 2022-03-20 RX ORDER — WARFARIN SODIUM 2.5 MG/1
2.5 TABLET ORAL DAILY
Status: DISCONTINUED | OUTPATIENT
Start: 2022-03-20 | End: 2022-03-21

## 2022-03-20 RX ORDER — METOPROLOL TARTRATE 50 MG/1
50 TABLET, FILM COATED ORAL 2 TIMES DAILY
Status: DISCONTINUED | OUTPATIENT
Start: 2022-03-20 | End: 2022-03-23 | Stop reason: HOSPADM

## 2022-03-20 RX ORDER — PRAVASTATIN SODIUM 40 MG
40 TABLET ORAL NIGHTLY
Status: DISCONTINUED | OUTPATIENT
Start: 2022-03-20 | End: 2022-03-23 | Stop reason: HOSPADM

## 2022-03-20 RX ORDER — ONDANSETRON 2 MG/ML
4 INJECTION INTRAMUSCULAR; INTRAVENOUS EVERY 4 HOURS PRN
Status: DISCONTINUED | OUTPATIENT
Start: 2022-03-20 | End: 2022-03-23 | Stop reason: HOSPADM

## 2022-03-20 RX ORDER — POTASSIUM CHLORIDE 20 MEQ/1
40 TABLET, EXTENDED RELEASE ORAL PRN
Status: DISCONTINUED | OUTPATIENT
Start: 2022-03-20 | End: 2022-03-23 | Stop reason: HOSPADM

## 2022-03-20 RX ORDER — OXYBUTYNIN CHLORIDE 10 MG/1
1 TABLET, EXTENDED RELEASE ORAL DAILY
Status: DISCONTINUED | OUTPATIENT
Start: 2022-03-20 | End: 2022-03-23 | Stop reason: HOSPADM

## 2022-03-20 RX ORDER — LISINOPRIL 20 MG/1
20 TABLET ORAL DAILY
Status: DISCONTINUED | OUTPATIENT
Start: 2022-03-20 | End: 2022-03-23 | Stop reason: HOSPADM

## 2022-03-20 RX ORDER — HYDROCHLOROTHIAZIDE 25 MG/1
12.5 TABLET ORAL DAILY
Status: DISCONTINUED | OUTPATIENT
Start: 2022-03-20 | End: 2022-03-23 | Stop reason: HOSPADM

## 2022-03-20 RX ORDER — ACETAMINOPHEN 325 MG/1
325 TABLET ORAL EVERY 6 HOURS PRN
Status: DISCONTINUED | OUTPATIENT
Start: 2022-03-20 | End: 2022-03-23 | Stop reason: HOSPADM

## 2022-03-20 RX ORDER — METOPROLOL TARTRATE 5 MG/5ML
5 INJECTION INTRAVENOUS EVERY 6 HOURS PRN
Status: DISCONTINUED | OUTPATIENT
Start: 2022-03-20 | End: 2022-03-23 | Stop reason: HOSPADM

## 2022-03-20 RX ORDER — ASPIRIN 325 MG
325 TABLET ORAL DAILY
Status: DISCONTINUED | OUTPATIENT
Start: 2022-03-20 | End: 2022-03-23 | Stop reason: HOSPADM

## 2022-03-20 RX ADMIN — MAGNESIUM SULFATE HEPTAHYDRATE 1000 MG: 1 INJECTION, SOLUTION INTRAVENOUS at 16:47

## 2022-03-20 RX ADMIN — ANTI-FUNGAL POWDER MICONAZOLE NITRATE TALC FREE: 1.42 POWDER TOPICAL at 20:21

## 2022-03-20 RX ADMIN — METOPROLOL TARTRATE 50 MG: 50 TABLET, FILM COATED ORAL at 20:12

## 2022-03-20 RX ADMIN — ONDANSETRON 4 MG: 2 INJECTION INTRAMUSCULAR; INTRAVENOUS at 06:29

## 2022-03-20 RX ADMIN — HYDROCHLOROTHIAZIDE 12.5 MG: 25 TABLET ORAL at 20:19

## 2022-03-20 RX ADMIN — CLINDAMYCIN PHOSPHATE 300 MG: 300 INJECTION, SOLUTION INTRAVENOUS at 18:20

## 2022-03-20 RX ADMIN — WARFARIN SODIUM 2.5 MG: 2.5 TABLET ORAL at 18:17

## 2022-03-20 RX ADMIN — MAGNESIUM SULFATE HEPTAHYDRATE 1000 MG: 1 INJECTION, SOLUTION INTRAVENOUS at 14:28

## 2022-03-20 RX ADMIN — PRAVASTATIN SODIUM 40 MG: 40 TABLET ORAL at 20:12

## 2022-03-20 RX ADMIN — ONDANSETRON 4 MG: 2 INJECTION INTRAMUSCULAR; INTRAVENOUS at 12:44

## 2022-03-20 RX ADMIN — MAGNESIUM SULFATE HEPTAHYDRATE 1000 MG: 1 INJECTION, SOLUTION INTRAVENOUS at 11:03

## 2022-03-20 RX ADMIN — MAGNESIUM SULFATE HEPTAHYDRATE 1000 MG: 1 INJECTION, SOLUTION INTRAVENOUS at 12:40

## 2022-03-20 RX ADMIN — POTASSIUM CHLORIDE 10 MEQ: 7.46 INJECTION, SOLUTION INTRAVENOUS at 14:24

## 2022-03-20 RX ADMIN — POTASSIUM CHLORIDE 10 MEQ: 7.46 INJECTION, SOLUTION INTRAVENOUS at 12:15

## 2022-03-20 RX ADMIN — POTASSIUM CHLORIDE 10 MEQ: 7.46 INJECTION, SOLUTION INTRAVENOUS at 11:01

## 2022-03-20 RX ADMIN — ONDANSETRON 4 MG: 2 INJECTION INTRAMUSCULAR; INTRAVENOUS at 21:30

## 2022-03-20 RX ADMIN — ASPIRIN 325 MG ORAL TABLET 325 MG: 325 PILL ORAL at 20:12

## 2022-03-20 RX ADMIN — POTASSIUM CHLORIDE 10 MEQ: 7.46 INJECTION, SOLUTION INTRAVENOUS at 16:41

## 2022-03-20 RX ADMIN — ONDANSETRON 4 MG: 2 INJECTION INTRAMUSCULAR; INTRAVENOUS at 17:31

## 2022-03-20 RX ADMIN — SODIUM CHLORIDE, PRESERVATIVE FREE 10 ML: 5 INJECTION INTRAVENOUS at 11:00

## 2022-03-20 RX ADMIN — OXYBUTYNIN CHLORIDE 10 MG: 10 TABLET, EXTENDED RELEASE ORAL at 20:12

## 2022-03-20 RX ADMIN — LISINOPRIL 20 MG: 20 TABLET ORAL at 20:19

## 2022-03-20 ASSESSMENT — PAIN SCALES - GENERAL
PAINLEVEL_OUTOF10: 0

## 2022-03-20 NOTE — PLAN OF CARE
Problem: Pain:  Goal: Pain level will decrease  Description: Pain level will decrease  3/20/2022 0252 by Patrice Ryena RN  Outcome: Ongoing  Note: Prn tylenol effective for c/o headache, will continue to monitor     Goal: Control of acute pain  Description: Control of acute pain  3/19/2022 1834 by Anahi Delagdo RN  Outcome: Ongoing    Goal: Control of chronic pain  Description: Control of chronic pain  3/19/2022 1834 by Anahi Delgado RN  Outcome: Ongoing    Problem: Skin Integrity:  Goal: Will show no infection signs and symptoms  Description: Will show no infection signs and symptoms  3/20/2022 0252 by Patrice Reyna RN  Outcome: Ongoing  Note: Cellulitits noted to bilateral legs, patient afebrile, wbc within normal range, waiting for doctor to confirm if patient needs antibiotics  3/19/2022 1834 by Anahi Delgado RN  Outcome: Ongoing  3/19/2022 1832 by Anahi Delgado RN  Outcome: Ongoing  Goal: Absence of new skin breakdown  Description: Absence of new skin breakdown  3/20/2022 0252 by Patrice Reyna RN  Outcome: Ongoing    Problem: Falls - Risk of:  Goal: Will remain free from falls  Description: Will remain free from falls  Outcome: Ongoing  Note: Fall precautions in place, patient free from injuries.  Patient educated about safety precautions, will continue to monitor    Goal: Absence of physical injury  Description: Absence of physical injury  Outcome: Ongoing

## 2022-03-20 NOTE — H&P
Hospital Medicine  History and Physical                                                                                                                                                 Full Code    Patient:  Sulma Raman  MRN: [id number]                                                                                                                                                                     CHIEF COMPLAINT:  diarrhea    History Obtained From:  patient, electronic medical record  PCP: Lynsey Ronquillo    HISTORY OF PRESENT ILLNESS:   The patient is a 78 y.o. female who presents with h/o of having had diarrhea for last 1 week, no associated fever, pt has had covid shot in past, pt was also having some nausea, pt has h/o of aneurysm cerbral which is being watched closely. Past Medical History:        Diagnosis Date    Aneurysm, cerebral 05/22/2017    Arthritis     gout    Breast cancer ([address] 2011    left-lumpectomy followed by chemo and radiation    Diabetes mellitus ([facility]     not any more, ACTOS discontinued     H/O transfusion 2011    2 Units    Hyperlipidemia     ON RX    Hypertension     ON RX    Porphyria ([facility]    [facility] Wears glasses        Past Surgical History:        Procedure Laterality Date    BREAST LUMPECTOMY Left 2011    Chemo & Radiation    COLONOSCOPY N/A 5/24/2019    COLONOSCOPY POLYPECTOMY HOT BIOPSY performed by Temi Wells MD at [address] [facility] HISTORY  06/2017    CEREBRA EMBO COILING WITH STENT    OTHER SURGICAL HISTORY Left 12/21/2017    coil embolization       Medications Prior to Admission:    Prior to Admission medications    Medication Sig Start Date End Date Taking?  Authorizing Provider   warfarin (COUMADIN) 3 MG tablet Take 3 mg by mouth daily   Yes Historical Provider, MD   metoprolol tartrate (LOPRESSOR) 50 MG tablet Take 50 mg by mouth 2 times daily   Yes Historical Provider, MD   omeprazole (PRILOSEC) 20 MG delayed release capsule Take 20 mg by mouth Daily   Yes Historical Provider, MD   doxycycline monohydrate (ADOXA) 100 MG tablet Take 1 tablet by mouth 2 times daily for 10 days 3/18/22 3/28/22  Kenneth Nip, DO   ondansetron (ZOFRAN ODT) 4 MG disintegrating tablet Take 1 tablet by mouth every 8 hours as needed for Nausea 3/18/22   Kenneth Nip, DO   oxybutynin (DITROPAN-XL) 10 MG extended release tablet TAKE ONE TABLET BY MOUTH DAILY 9/8/20   Kristi Paulson MD   predniSONE (DELTASONE) 10 MG tablet Take 10 mg by mouth 2 times daily     Historical Provider, MD   acetaminophen (TYLENOL) 500 MG tablet Take 1,000 mg by mouth every 6 hours as needed for Pain    Historical Provider, MD   aspirin 325 MG tablet Take 325 mg by mouth daily     Historical Provider, MD   pravastatin (PRAVACHOL) 40 MG tablet Take 40 mg by mouth nightly  Patient not taking: Reported on 3/19/2022    Historical Provider, MD   lisinopril-hydrochlorothiazide (PRINZIDE;ZESTORETIC) 20-12.5 MG per tablet Take 1 tablet by mouth daily     Historical Provider, MD   allopurinol (ZYLOPRIM) 300 MG tablet Take 300 mg by mouth daily    Historical Provider, MD       Current meds  Scheduled Meds:   clindamycin (CLEOCIN) IV  300 mg IntraVENous Q12H    miconazole   Topical BID    warfarin  2.5 mg Oral Daily    sodium chloride flush  5-40 mL IntraVENous 2 times per day     Continuous Infusions:   sodium chloride 100 mL/hr at 03/20/22 0631    sodium chloride       PRN Meds:.metoprolol, magnesium sulfate, potassium chloride **OR** potassium alternative oral replacement **OR** potassium chloride, ondansetron **OR** ondansetron, sodium chloride flush, sodium chloride, acetaminophen, HYDROcodone 5 mg - acetaminophen    Allergies:  Dicloxacillin, Pcn [penicillins], and Sulfa antibiotics    Social History:   TOBACCO:   reports that she quit smoking about 17 years ago. Her smoking use included cigarettes. She started smoking about 59 years ago. She smoked 1.00 pack per day.  She has never used smokeless tobacco.  ETOH:   reports no history of alcohol use. OCCUPATION:      Family History:       Problem Relation Age of Onset    Heart Disease Mother     Kidney Disease Mother         One kindney    Other Father         DROWN    Heart Attack Brother     No Known Problems Maternal Grandmother     No Known Problems Maternal Grandfather     Diabetes Paternal Grandmother     No Known Problems Paternal Grandfather     Colon Cancer Brother     High Blood Pressure Son     Arthritis Son        REVIEW OF SYSTEMS:  Constitutional:  negative for  fevers, chills, sweats and weight loss  HEENT:  negative for  hearing loss, ear drainage, nasal congestion, snoring, hoarseness and voice change  Neck:  No swollen glands and No h/o goiter or thyroid disease  Cardiac:  negative for  chest pain, dyspnea, palpitations, orthopnea, PND, edema  Respiratory:  negative for  dry cough, cough with sputum, dyspnea, wheezing and hemoptysis  Gastrointestinal:  See hpi  :  negative for frequency, dysuria, urinary incontinence, hesitancy, decreased stream and hematuria  Musculoskeletal:  negative for  myalgias, arthralgias, joint swelling and stiff joints  Neuro:  negative for headaches, dizziness, seizures, memory problems, visual disturbance, weakness and syncope      Physical Exam:    Vitals: /83   Pulse 109   Temp 97.9 °F (36.6 °C) (Oral)   Resp 20   LMP 12/20/2011 (Approximate)   SpO2 92%   General appearance: alert, appears stated age and cooperative  Skin: Skin color, texture, turgor normal. No rashes or lesions  HEENT: Head: Normocephalic, no lesions, without obvious abnormality.   Eye: Normal external eye, conjunctiva, lids cornea, GALA  Neck: no adenopathy, no carotid bruit, no JVD, supple, symmetrical, trachea midline and thyroid not enlarged, symmetric, no tenderness/mass/nodules  Lungs: clear to auscultation bilaterally  Heart: regular rate and rhythm, S1, S2 normal, no murmur, click, rub or gallop  Abdomen: soft, non-tender; bowel sounds normal; no masses,  no organomegaly  Extremities: extremities normal, atraumatic, no cyanosis or edema, cellulitis in both lower extremities  Neurologic: Mental status: Alert, oriented, thought content appropriate    CBC:   Recent Labs     03/18/22  0113 03/19/22  1046 03/20/22  0854   WBC 8.9 7.6 5.3   HGB 10.2* 10.0* 9.9*    201 168     BMP:    Recent Labs     03/18/22  0113 03/19/22  1046 03/20/22  0854    139 139   K 3.5* 3.3* 3.3*    100 104   CO2 24 23 20   BUN 20 20 18   CREATININE 1.82* 2.24* 1.85*   GLUCOSE 152* 120* 127*     Hepatic:   Recent Labs     03/18/22  0113 03/19/22  1046   AST 14 15   ALT 9 9   BILITOT 1.02 0.78   ALKPHOS 58 60     Troponin: No results for input(s): TROPONINI in the last 72 hours. BNP: No results for input(s): BNP in the last 72 hours. Lipids: No results for input(s): CHOL, HDL in the last 72 hours. Invalid input(s): LDLCALCU  ABGs: No results found for: PHART, PO2ART, LPI4YJO  INR:   Recent Labs     03/19/22  1046   INR 2.8     -----------------------------------------------------------------  PA/lat CXR: CT HEAD WO CONTRAST    Result Date: 3/19/2022  EXAMINATION: CT OF THE HEAD WITHOUT CONTRAST  3/19/2022 10:02 am TECHNIQUE: CT of the head was performed without the administration of intravenous contrast. Dose modulation, iterative reconstruction, and/or weight based adjustment of the mA/kV was utilized to reduce the radiation dose to as low as reasonably achievable.  COMPARISON: January 5, 2018 HISTORY: ORDERING SYSTEM PROVIDED HISTORY: headache 2 days, hx of aneurysm TECHNOLOGIST PROVIDED HISTORY: headache 2 days, hx of aneurysm Decision Support Exception - unselect if not a suspected or confirmed emergency medical condition->Emergency Medical Condition (MA) Reason for Exam: head pain x 2 weeks getting worse, hx of aneurysm and stent FINDINGS: BRAIN/VENTRICLES: There is no acute intracranial hemorrhage, mass effect or midline shift. No abnormal extra-axial fluid collection. The gray-white differentiation is maintained without evidence of an acute infarct. There is no evidence of hydrocephalus. Redemonstration of coils in the left cavernous area and internal carotid stent is noted crossing from the skull base into the supraclinoid area. There is redemonstration of atrophy and small-vessel disease ischemic changes. No evidence of new or acute infarcts. ORBITS: The visualized portion of the orbits demonstrate no acute abnormality. SINUSES: The visualized paranasal sinuses and mastoid air cells demonstrate no acute abnormality. SOFT TISSUES/SKULL:  No acute abnormality of the visualized skull or soft tissues. No acute intracranial abnormality. Redemonstration of metallic coils in the left cavernous area. Redemonstration of left internal carotid artery stent in this area. Redemonstration of atrophy and small-vessel disease ischemic changes. MRA HEAD WO CONTRAST    Result Date: 3/19/2022  EXAMINATION: MRA OF THE HEAD WITHOUT CONTRAST; MRI OF THE BRAIN WITHOUT CONTRAST 3/19/2022 1:10 pm; 3/19/2022 1:23 pm: TECHNIQUE: MRA of the head was performed utilizing time-of-flight imaging with MIP images. No intravenous contrast was administered.; Multiplanar multisequence MRI of the brain was performed without the administration of intravenous contrast. COMPARISON: MRA 06/31/2020. HISTORY: ORDERING SYSTEM PROVIDED HISTORY: headache, hx of aneurysm with coil TECHNOLOGIST PROVIDED HISTORY: headache, hx of aneurysm with coil What is the sedation requirement?->None Decision Support Exception - unselect if not a suspected or confirmed emergency medical condition->Emergency Medical Condition (MA) Reason for Exam: Pt having dizziness and headaches, sharp at times.   Pt has Pipeline embolization device from aneurysm repair 2017.; ORDERING SYSTEM PROVIDED HISTORY: headache 2 days, hx of aneurysm with coil TECHNOLOGIST PROVIDED HISTORY: headache 2 days, hx of aneurysm with coil Decision Support Exception - unselect if not a suspected or confirmed emergency medical condition->Emergency Medical Condition (MA) Reason for Exam: Pt having dizziness and headaches, sharp at times. Pt has Pipeline embolization device from aneurysm repair 2017. FINDINGS: MRI BRAIN: INTRACRANIAL STRUCTURES/VENTRICLES: There is a small chronic infarct within the left cerebellum as well as the right frontal lobe. No mass effect or midline shift. No evidence of an acute intracranial hemorrhage. Areas of T2 FLAIR hyperintensity are seen in the periventricular and subcortical white matter, which are nonspecific, but may represent chronic microvascular ischemic change. There is mild-to-moderate global parenchymal volume loss. Otherwise, the ventricles and sulci are normal in size and configuration. The sellar/suprasellar regions appear unremarkable. The normal signal voids within the major intracranial vessels appear maintained. ORBITS: The visualized portion of the orbits demonstrate no acute abnormality. SINUSES: There is trace mucosal thickening of the left maxillary sinus. The mastoid air cells demonstrate no acute abnormality. BONES/SOFT TISSUES: The bone marrow signal intensity appears normal. The soft tissues demonstrate no acute abnormality. MRA HEAD: ANTERIOR CIRCULATION: Susceptibility is again seen in the region of the left internal carotid artery due to the stent assisted coiling of the left ICA aneurysm. This limits evaluation of the left internal carotid artery. No focal stenosis is seen of the right internal carotid artery. No convincing flow limiting stenosis of the anterior cerebral or middle cerebral arteries. POSTERIOR CIRCULATION: There is a right dominant vertebral artery. The distal branches of the left PCA are not well visualized, which may be partially due to motion.   Otherwise, no convincing focal stenosis seen of the posterior cerebral arteries. No convincing intracranial aneurysm. 1. No acute intracranial abnormality. No acute infarct. 2. 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 3. Mild-to-moderate global parenchymal volume loss with moderate chronic microvascular ischemic changes. 4. Susceptibility in the region of the left internal carotid artery due to the stent assisted coiling limits evaluation of the left internal carotid artery. 5. The distal branches of the left PCA are not well visualized, which may be partially due to motion. 6. Otherwise, no significant stenosis is seen of the [id number]. 7. No convincing intracranial aneurysm. MRI BRAIN WO CONTRAST    Result Date: 3/19/2022  EXAMINATION: MRA OF THE HEAD WITHOUT CONTRAST; MRI OF THE BRAIN WITHOUT CONTRAST 3/19/2022 1:10 pm; 3/19/2022 1:23 pm: TECHNIQUE: MRA of the head was performed utilizing time-of-flight imaging with MIP images. No intravenous contrast was administered.; Multiplanar multisequence MRI of the brain was performed without the administration of intravenous contrast. COMPARISON: MRA 06/31/2020. HISTORY: ORDERING SYSTEM PROVIDED HISTORY: headache, hx of aneurysm with coil TECHNOLOGIST PROVIDED HISTORY: headache, hx of aneurysm with coil What is the sedation requirement?->None Decision Support Exception - unselect if not a suspected or confirmed emergency medical condition->Emergency Medical Condition (MA) Reason for Exam: Pt having dizziness and headaches, sharp at times. Pt has Pipeline embolization device from aneurysm repair 2017.; ORDERING SYSTEM PROVIDED HISTORY: headache 2 days, hx of aneurysm with coil TECHNOLOGIST PROVIDED HISTORY: headache 2 days, hx of aneurysm with coil Decision Support Exception - unselect if not a suspected or confirmed emergency medical condition->Emergency Medical Condition (MA) Reason for Exam: Pt having dizziness and headaches, sharp at times.   Pt has Pipeline embolization device from aneurysm repair 2017. FINDINGS: MRI BRAIN: INTRACRANIAL STRUCTURES/VENTRICLES: There is a small chronic infarct within the left cerebellum as well as the right frontal lobe. No mass effect or midline shift. No evidence of an acute intracranial hemorrhage. Areas of T2 FLAIR hyperintensity are seen in the periventricular and subcortical white matter, which are nonspecific, but may represent chronic microvascular ischemic change. There is mild-to-moderate global parenchymal volume loss. Otherwise, the ventricles and sulci are normal in size and configuration. The sellar/suprasellar regions appear unremarkable. The normal signal voids within the major intracranial vessels appear maintained. ORBITS: The visualized portion of the orbits demonstrate no acute abnormality. SINUSES: There is trace mucosal thickening of the left maxillary sinus. The mastoid air cells demonstrate no acute abnormality. BONES/SOFT TISSUES: The bone marrow signal intensity appears normal. The soft tissues demonstrate no acute abnormality. MRA HEAD: ANTERIOR CIRCULATION: Susceptibility is again seen in the region of the left internal carotid artery due to the stent assisted coiling of the left ICA aneurysm. This limits evaluation of the left internal carotid artery. No focal stenosis is seen of the right internal carotid artery. No convincing flow limiting stenosis of the anterior cerebral or middle cerebral arteries. POSTERIOR CIRCULATION: There is a right dominant vertebral artery. The distal branches of the left PCA are not well visualized, which may be partially due to motion. Otherwise, no convincing focal stenosis seen of the posterior cerebral arteries. No convincing intracranial aneurysm. 1. No acute intracranial abnormality. No acute infarct. 2. 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  3. Mild-to-moderate global parenchymal volume loss with moderate chronic microvascular ischemic changes. 4. Susceptibility in the region of the left internal carotid artery due to the stent assisted coiling limits evaluation of the left internal carotid artery. 5. The distal branches of the left PCA are not well visualized, which may be partially due to motion. 6. Otherwise, no significant stenosis is seen of the [id number]. 7. No convincing intracranial aneurysm. EKG: Atrial fibrillation with rapid ventricular response  Nonspecific ST and T wave abnormality  Abnormal ECG  When compared with ECG of 14-SEP-2019 19:38,  Atrial fibrillation has replaced Sinus rhythm  ST now depressed in Anterior leads  Nonspecific T wave abnormality now evident in Inferior leads  Nonspecific T wave abnormality now evident in Lateral leads    Prophylaxis:   DVT with  [] lovenox        [] heparin        [] Scd        [x] coumadin    Assessment and Plan   1. A fib/rate better/on coumadin  2. Diarrhea/check for stool pathogen/likely viral  3.  Check for stool for c diff  4. 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    Hypomagnesemia E83.42       Anticipated Disposition upon discharge: [] Home                                                                         [] Home with Home Health                                                                         [] [facility]                                                                         [] [address]          Patient is admitted as inpatient status because of co-morbidities listed above, severity of signs and symptoms as outlined, requirement for current medical therapies and most importantly because of direct risk to patient if care not provided in a hospital setting.     Elyse Burton MD, MD  Admitting Hospitalist

## 2022-03-20 NOTE — FLOWSHEET NOTE
03/20/22 1343   Treatment Team Notification   Reason for Communication Evaluate   Critical Lab Result Type Other (comment)   Team Member Name Dr Ben Pederson Attending Provider   Method of Communication Call   PAtient AFIB RVR with heart rate in 130s.  Patient taked Metoprolol 50 mg BID and experiencing Nausea and Vomiting

## 2022-03-20 NOTE — CARE COORDINATION
CASE MANAGEMENT NOTE:    Admission Date:  3/19/2022 Anastasia Fang is a 78 y.o.  female    Admitted for : Hypokalemia [E87.6]  Hypomagnesemia [E83.42]  Dizziness [R42]  Hypomagnesemia syndrome [E83.42]  Atrial fibrillation with RVR (HCC) [I48.91]  Acute intractable headache, unspecified headache type [R51.9]  Chronic kidney disease, unspecified CKD stage [N18.9]    Met with:  Patient    PCP:  Lu Mejia                                Insurance:  MEDICARE      Is patient alert and oriented at time of discussion:  Yes    Current Residence/ Living Arrangements:  independently at home , still drives       Current Services PTA:  No    Does patient go to outpatient dialysis: No  If yes, location and chair time: NA    Is patient agreeable to VNS: No    Freedom of choice provided:  No    List of [address] provided: No    VNS chosen:  No    DME:  straight cane, walker, shower chair and other grab bars    Home Oxygen: No    Nebulizer: No    CPAP/BIPAP: No    Supplier: N/A    Potential Assistance Needed: No    SNF needed: No    Freedom of choice and list provided: No    Pharmacy:  [facility]       Does Patient want to use MEDS to BEDS? No    Is patient currently receiving oral anticoagulation therapy? Yes- Warfarin PTA for afib . [place] [facility] [place] [facility]    Is the Patient an [facility] with Readmission Risk Score greater than 14%? No  If yes, pt needs a follow up appointment made within 7 days. Family Members/Caregivers that pt would like involved in their care:    Yes    If yes, list name here:  Luis Monroe    Transportation Provider:  Patient             Discharge Plan:  3/20 MEDICARE From home alone, independent and still drives. DME: cane , walker, SC, GB. VNS: none . Pt is current with [address] for coumadin for afib. Awaiting new orders . Pt denies needs for discharge.  Following for needs//JF              Electronically signed by: Mandi Zendejas RN on 3/20/2022 at 10:35 AM

## 2022-03-20 NOTE — PROGRESS NOTES
Brenda Lou for clarification on antibiotics, none ordered for treatment of cellulitis, will follow up

## 2022-03-21 LAB
ABSOLUTE EOS #: 0 K/UL (ref 0–0.4)
ABSOLUTE LYMPH #: 1.2 K/UL (ref 1–4.8)
ABSOLUTE MONO #: 0.4 K/UL (ref 0.1–1.3)
ANION GAP SERPL CALCULATED.3IONS-SCNC: 13 MMOL/L (ref 9–17)
BASOPHILS # BLD: 1 % (ref 0–2)
BASOPHILS ABSOLUTE: 0 K/UL (ref 0–0.2)
BUN BLDV-MCNC: 13 MG/DL (ref 8–23)
CALCIUM SERPL-MCNC: 7.6 MG/DL (ref 8.6–10.4)
CAMPYLOBACTER PCR: NORMAL
CHLORIDE BLD-SCNC: 101 MMOL/L (ref 98–107)
CO2: 21 MMOL/L (ref 20–31)
CREAT SERPL-MCNC: 1.62 MG/DL (ref 0.5–0.9)
E COLI ENTEROTOXIGENIC PCR: NORMAL
EOSINOPHILS RELATIVE PERCENT: 0 % (ref 0–4)
GFR AFRICAN AMERICAN: 38 ML/MIN
GFR NON-AFRICAN AMERICAN: 31 ML/MIN
GFR SERPL CREATININE-BSD FRML MDRD: ABNORMAL ML/MIN/{1.73_M2}
GLUCOSE BLD-MCNC: 109 MG/DL (ref 70–99)
HCT VFR BLD CALC: 30 % (ref 36–46)
HEMOGLOBIN: 9.9 G/DL (ref 12–16)
INR BLD: 3.8
LYMPHOCYTES # BLD: 26 % (ref 24–44)
MAGNESIUM: 2 MG/DL (ref 1.6–2.6)
MCH RBC QN AUTO: 28.9 PG (ref 26–34)
MCHC RBC AUTO-ENTMCNC: 32.8 G/DL (ref 31–37)
MCV RBC AUTO: 88 FL (ref 80–100)
MONOCYTES # BLD: 8 % (ref 1–7)
PDW BLD-RTO: 18.5 % (ref 11.5–14.9)
PLATELET # BLD: 188 K/UL (ref 150–450)
PLESIOMONAS SHIGELLOIDES PCR: NORMAL
PMV BLD AUTO: 7.9 FL (ref 6–12)
POTASSIUM SERPL-SCNC: 4 MMOL/L (ref 3.7–5.3)
PROTHROMBIN TIME: 37.4 SEC (ref 11.8–14.6)
RBC # BLD: 3.41 M/UL (ref 4–5.2)
SALMONELLA PCR: NORMAL
SEG NEUTROPHILS: 65 % (ref 36–66)
SEGMENTED NEUTROPHILS ABSOLUTE COUNT: 3.1 K/UL (ref 1.3–9.1)
SHIGATOXIN GENE PCR: NORMAL
SHIGELLA SP PCR: NORMAL
SODIUM BLD-SCNC: 135 MMOL/L (ref 135–144)
SPECIMEN DESCRIPTION: NORMAL
VIBRIO PCR: NORMAL
WBC # BLD: 4.8 K/UL (ref 3.5–11)
YERSINIA ENTEROCOLITICA PCR: NORMAL

## 2022-03-21 PROCEDURE — 2580000003 HC RX 258: Performed by: EMERGENCY MEDICINE

## 2022-03-21 PROCEDURE — 2500000003 HC RX 250 WO HCPCS: Performed by: FAMILY MEDICINE

## 2022-03-21 PROCEDURE — 83735 ASSAY OF MAGNESIUM: CPT

## 2022-03-21 PROCEDURE — 85610 PROTHROMBIN TIME: CPT

## 2022-03-21 PROCEDURE — 2060000000 HC ICU INTERMEDIATE R&B

## 2022-03-21 PROCEDURE — 80048 BASIC METABOLIC PNL TOTAL CA: CPT

## 2022-03-21 PROCEDURE — 85025 COMPLETE CBC W/AUTO DIFF WBC: CPT

## 2022-03-21 PROCEDURE — 2580000003 HC RX 258: Performed by: INTERNAL MEDICINE

## 2022-03-21 PROCEDURE — 36415 COLL VENOUS BLD VENIPUNCTURE: CPT

## 2022-03-21 PROCEDURE — 6370000000 HC RX 637 (ALT 250 FOR IP): Performed by: FAMILY MEDICINE

## 2022-03-21 RX ADMIN — CLINDAMYCIN PHOSPHATE 300 MG: 300 INJECTION, SOLUTION INTRAVENOUS at 05:26

## 2022-03-21 RX ADMIN — METOPROLOL TARTRATE 50 MG: 50 TABLET, FILM COATED ORAL at 21:16

## 2022-03-21 RX ADMIN — HYDROCHLOROTHIAZIDE 12.5 MG: 25 TABLET ORAL at 08:55

## 2022-03-21 RX ADMIN — SODIUM CHLORIDE, PRESERVATIVE FREE 10 ML: 5 INJECTION INTRAVENOUS at 21:18

## 2022-03-21 RX ADMIN — SODIUM CHLORIDE: 9 INJECTION, SOLUTION INTRAVENOUS at 15:27

## 2022-03-21 RX ADMIN — OXYBUTYNIN CHLORIDE 10 MG: 10 TABLET, EXTENDED RELEASE ORAL at 08:54

## 2022-03-21 RX ADMIN — ASPIRIN 325 MG ORAL TABLET 325 MG: 325 PILL ORAL at 08:55

## 2022-03-21 RX ADMIN — PRAVASTATIN SODIUM 40 MG: 40 TABLET ORAL at 21:16

## 2022-03-21 RX ADMIN — CLINDAMYCIN PHOSPHATE 300 MG: 300 INJECTION, SOLUTION INTRAVENOUS at 21:16

## 2022-03-21 RX ADMIN — METOPROLOL TARTRATE 50 MG: 50 TABLET, FILM COATED ORAL at 08:54

## 2022-03-21 RX ADMIN — CLINDAMYCIN PHOSPHATE 300 MG: 300 INJECTION, SOLUTION INTRAVENOUS at 11:03

## 2022-03-21 RX ADMIN — PANTOPRAZOLE SODIUM 40 MG: 40 TABLET, DELAYED RELEASE ORAL at 05:24

## 2022-03-21 RX ADMIN — ANTI-FUNGAL POWDER MICONAZOLE NITRATE TALC FREE: 1.42 POWDER TOPICAL at 08:56

## 2022-03-21 RX ADMIN — ANTI-FUNGAL POWDER MICONAZOLE NITRATE TALC FREE: 1.42 POWDER TOPICAL at 21:18

## 2022-03-21 RX ADMIN — LISINOPRIL 20 MG: 20 TABLET ORAL at 08:56

## 2022-03-21 RX ADMIN — ALLOPURINOL 300 MG: 300 TABLET ORAL at 08:55

## 2022-03-21 NOTE — PROGRESS NOTES
Physician Progress Note      Dayday Chaves  CSN #:                  [id number]  :                       1945  ADMIT DATE:       3/19/2022 10:30 AM  [address] [place] DATE:  RESPONDING  PROVIDER #:        Sergei Pham MD          QUERY TEXT:    Pt admitted with AFib w/ RVR, Intractable headache, hypokalemia and diarrhea. Pt noted to have elevated Creatinine of 2.24 on admission. If possible, please   document in the progress notes and discharge summary if you are evaluating   and/or treating any of the following: The medical record reflects the following:  Risk Factors: Dehydration secondary to diarrhea x1 week, HTN, hx of DM2  Clinical Indicators: Creatinine 2.24 on admission. Improved to 1.62; Prior   results indicate a baseline range of 1.05- 1.50. ED MD noted a diagnosis of   CKD, unknown stage. No previous documented medical hx of CKD. Treatment: labs, treatment of HTN,  IVF's at 100/hr    Defined by Kidney Disease Improving Global Outcomes (KDIGO) clinical practice   guideline for acute kidney injury:  -Increase in SCr by greater than or equal to 0.3 mg/dl within 48 hours; or  -Increase or decrease in SCr to greater than or equal to 1.5 times baseline,   which is known or presumed to have occurred within the prior 7 days; or  -Urine volume < 0.5ml/kg/h for 6 hours  Options provided:  -- Acute kidney failure  -- ASHLEE ruled out, CKD stage, please specify.   -- Other - I will add my own diagnosis  -- Disagree - Not applicable / Not valid  -- Disagree - Clinically unable to determine / Unknown  -- Refer to Clinical Documentation Reviewer    PROVIDER RESPONSE TEXT:    ASHLEE ruled out, this patient has CKD stage cr was high,at time of admission    Query created by: Nevin Mathis on 3/21/2022 4:48 PM      Electronically signed by:  Sergei Pham MD 3/21/2022 6:30 PM

## 2022-03-21 NOTE — PLAN OF CARE
Problem: Pain:  Goal: Pain level will decrease  Description: Pain level will decrease  Outcome: Ongoing  Goal: Control of acute pain  Description: Control of acute pain  Outcome: Ongoing  Goal: Control of chronic pain  Description: Control of chronic pain  Outcome: Ongoing     Problem: Skin Integrity:  Goal: Will show no infection signs and symptoms  Description: Will show no infection signs and symptoms  Outcome: Ongoing  Goal: Absence of new skin breakdown  Description: Absence of new skin breakdown  Outcome: Ongoing     Problem: Fluid Volume:  Goal: Ability to achieve a balanced intake and output will improve  Description: Ability to achieve a balanced intake and output will improve  Outcome: Ongoing     Problem: Physical Regulation:  Goal: Ability to maintain clinical measurements within normal limits will improve  Description: Ability to maintain clinical measurements within normal limits will improve  Outcome: Ongoing  Goal: Will show no signs and symptoms of electrolyte imbalance  Description: Will show no signs and symptoms of electrolyte imbalance  Outcome: Ongoing     Problem: Falls - Risk of:  Goal: Will remain free from falls  Description: Will remain free from falls  Outcome: Ongoing  Goal: Absence of physical injury  Description: Absence of physical injury  Outcome: Ongoing

## 2022-03-21 NOTE — PROGRESS NOTES
Pharmacy Note  Warfarin Consult follow-up      Recent Labs     03/19/22  1046 03/21/22  1111   INR 2.8 3.8     Recent Labs     03/19/22  1046 03/20/22  0854 03/21/22  0607   HGB 10.0* 9.9* 9.9*   HCT 31.2* 30.3* 30.0*    168 188       Significant Drug-Drug Interactions:  New warfarin drug-drug interactions: cleocin  Discontinued drug-drug interactions: none   Current warfarin drug-drug interactions: aspirin, cleocin      Date             INR        Dose given previous day  Dose scheduled for today  3/21/2022            3.8 (goal 2-3)       2.5 mg           Hold  Notes:                     INR supratherapeutic at 3.8 (goal 2-3). Hold this evenings dose. Daily PT/INR while inpatient.      Kenisha James, PharmD, BCPS  3/21/2022 11:43 AM

## 2022-03-21 NOTE — PROGRESS NOTES
chloride **OR** potassium alternative oral replacement **OR** potassium chloride, ondansetron **OR** ondansetron, acetaminophen, ondansetron, sodium chloride flush, sodium chloride, acetaminophen, HYDROcodone 5 mg - acetaminophen    Data:     Past Medical History:   has a past medical history of Aneurysm, cerebral, Arthritis, Breast cancer ([facility] [address] Diabetes mellitus ([facility] [address] H/O transfusion, Hyperlipidemia, Hypertension, Porphyria ([facility] [address] and Wears glasses. Social History:   reports that she quit smoking about 17 years ago. Her smoking use included cigarettes. She started smoking about 59 years ago. She smoked 1.00 pack per day. She has never used smokeless tobacco. She reports that she does not drink alcohol and does not use drugs. Family History:   Family History   Problem Relation Age of Onset    Heart Disease Mother     Kidney Disease Mother         One kindney    Other Father         DROWN    Heart Attack Brother     No Known Problems Maternal Grandmother     No Known Problems Maternal Grandfather     Diabetes Paternal Grandmother     No Known Problems Paternal Grandfather     Colon Cancer Brother     High Blood Pressure Son     Arthritis Son        Vitals:  BP (!) 110/58   Pulse 82   Temp 97.6 °F (36.4 °C) (Oral)   Resp 18   LMP 2011 (Approximate)   SpO2 98%   Temp (24hrs), Av °F (36.7 °C), Min:97.4 °F (36.3 °C), Max:98.9 °F (37.2 °C)    No results for input(s): POCGLU in the last 72 hours. I/O (24Hr):     Intake/Output Summary (Last 24 hours) at 3/21/2022 0830  Last data filed at 3/20/2022 1922  Gross per 24 hour   Intake 220 ml   Output 900 ml   Net -680 ml       Labs:  Hematology:  Recent Labs     22  1046 22  0854 22  0607   WBC 7.6 5.3 4.8   RBC 3.51* 3.41* 3.41*   HGB 10.0* 9.9* 9.9*   HCT 31.2* 30.3* 30.0*   MCV 88.9 88.7 88.0   MCH 28.4 28.9 28.9   MCHC 31.9 32.6 32.8   RDW 18.6* 19.0* 18.5*    168 188   MPV 8.1 8.2 7.9   SEDRATE  --  52*  -- CRP  --  79.5*  --    INR 2.8  --   --      Chemistry:  Recent Labs     03/19/22  1046 03/19/22  1046 03/19/22  1430 03/20/22  0854 03/20/22  2107 03/21/22  0607     --   --  139  --  135   K 3.3*   < >  --  3.3* 3.8 4.0     --   --  104  --  101   CO2 23  --   --  20  --  21   GLUCOSE 120*  --   --  127*  --  109*   BUN 20  --   --  18  --  13   CREATININE 2.24*  --   --  1.85*  --  1.62*   MG 1.0*   < >  --  1.3* 2.1 2.0   ANIONGAP 16  --   --  15  --  13   LABGLOM 21*  --   --  27*  --  31*   GFRAA 26*  --   --  32*  --  38*   CALCIUM 7.6*  --   --  7.4*  --  7.6*   TROPHS 27*  --  24*  --   --   --     < > = values in this interval not displayed. Recent Labs     03/19/22  1046   PROT 6.3*   LABALBU 3.6   AST 15   ALT 9   ALKPHOS 60   BILITOT 0.78     ABG:No results found for: POCPH, PHART, PH, POCPCO2, GTX2YSM, PCO2, POCPO2, PO2ART, PO2, POCHCO3, YAZ8EVI, HCO3, NBEA, PBEA, BEART, BE, THGBART, THB, ZIG5IEB, LMWM4FNP, W0YZLXLN, O2SAT, FIO2  Lab Results   Component Value Date/Time    SPECIAL NOT REPORTED 07/27/2021 09:36 AM     Lab Results   Component Value Date/Time    CULTURE NO SIGNIFICANT GROWTH 07/27/2021 09:36 AM       Radiology:  CT ABDOMEN PELVIS WO CONTRAST Additional Contrast? None    Result Date: 3/18/2022  1. No acute findings in the abdomen or pelvis to account for the patient's pain. 2. Colonic diverticulosis. 3. The previously noted urethral diverticulum is less conspicuous. 4. Indeterminate proteinaceous cysts in the left kidney with the largest in the lower pole measuring 1.3 cm. Dedicated renal CT or MRI could be performed for further evaluation. CT HEAD WO CONTRAST    Result Date: 3/19/2022  No acute intracranial abnormality. Redemonstration of metallic coils in the left cavernous area. Redemonstration of left internal carotid artery stent in this area. Redemonstration of atrophy and small-vessel disease ischemic changes.      MRA HEAD WO CONTRAST    Result Date: 3/19/2022  1. No acute intracranial abnormality. No acute infarct. 2. 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 3. Mild-to-moderate global parenchymal volume loss with moderate chronic microvascular ischemic changes. 4. Susceptibility in the region of the left internal carotid artery due to the stent assisted coiling limits evaluation of the left internal carotid artery. 5. The distal branches of the left PCA are not well visualized, which may be partially due to motion. 6. Otherwise, no significant stenosis is seen of the [id number]-oi-Rknkty. 7. No convincing intracranial aneurysm. MRI BRAIN WO CONTRAST    Result Date: 3/19/2022  1. No acute intracranial abnormality. No acute infarct. 2. 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 3. Mild-to-moderate global parenchymal volume loss with moderate chronic microvascular ischemic changes. 4. Susceptibility in the region of the left internal carotid artery due to the stent assisted coiling limits evaluation of the left internal carotid artery. 5. The distal branches of the left PCA are not well visualized, which may be partially due to motion. 6. Otherwise, no significant stenosis is seen of the yzkerw-gy-Gzuphp. 7. No convincing intracranial aneurysm. Physical Examination:        General appearance:  alert, cooperative and no distress  Mental Status:  oriented to person, place and time and normal affect  Lungs:  clear to auscultation bilaterally, normal effort  Heart:  regular rate and rhythm, no murmur  Abdomen:  soft, nontender, nondistended, normal bowel sounds,   Extremities:   Lower extremity erythema and edema present    Assessment:        Hospital Problems           Last Modified POA    * (Principal) Hypomagnesemia 3/19/2022 Yes          Plan:        1.  diarrhea-  Improving  2.  lower extremity cellulitis-  Improving. Continue IV clindamycin.   Continue IV fluids  3.  AFib-  Metoprolol. Warfarin. Pharmacy to dose warfarin  4.  hypertension-  Lisinopril 20 mg, HCTZ 12.5 mg, metoprolol  5.  hyperlipidemia-  Pravastatin 40 mg. Also on aspirin  6.   GERD-  Pantoprazole  7.  hypomagnesemia-  improved    Maria D Muse MD  3/21/2022  8:30 AM

## 2022-03-21 NOTE — CARE COORDINATION
ONGOING DISCHARGE PLAN:    Patient is alert and oriented x4. Spoke with patient regarding discharge plan and patient confirms that plan is still to DC to home alone. Denies VNS. Remains on IV Cleocin. INR today 2.8, on Coumadin, follows at [facility]. Cr 1.62, K+ 4.0. Will continue to follow for additional discharge needs.     Electronically signed by Claire Warner RN on 3/21/2022 at 12:49 PM

## 2022-03-22 LAB
ABSOLUTE EOS #: 0 K/UL (ref 0–0.4)
ABSOLUTE LYMPH #: 1.5 K/UL (ref 1–4.8)
ABSOLUTE MONO #: 0.4 K/UL (ref 0.1–1.3)
ANION GAP SERPL CALCULATED.3IONS-SCNC: 12 MMOL/L (ref 9–17)
BASOPHILS # BLD: 0 % (ref 0–2)
BASOPHILS ABSOLUTE: 0 K/UL (ref 0–0.2)
BUN BLDV-MCNC: 10 MG/DL (ref 8–23)
CALCIUM SERPL-MCNC: 7.9 MG/DL (ref 8.6–10.4)
CHLORIDE BLD-SCNC: 106 MMOL/L (ref 98–107)
CO2: 20 MMOL/L (ref 20–31)
CREAT SERPL-MCNC: 1.49 MG/DL (ref 0.5–0.9)
EKG ATRIAL RATE: 113 BPM
EKG Q-T INTERVAL: 366 MS
EKG QRS DURATION: 74 MS
EKG QTC CALCULATION (BAZETT): 497 MS
EKG R AXIS: 36 DEGREES
EKG T AXIS: 44 DEGREES
EKG VENTRICULAR RATE: 111 BPM
EOSINOPHILS RELATIVE PERCENT: 0 % (ref 0–4)
GFR AFRICAN AMERICAN: 41 ML/MIN
GFR NON-AFRICAN AMERICAN: 34 ML/MIN
GFR SERPL CREATININE-BSD FRML MDRD: ABNORMAL ML/MIN/{1.73_M2}
GLUCOSE BLD-MCNC: 116 MG/DL (ref 70–99)
HCT VFR BLD CALC: 29.1 % (ref 36–46)
HEMOGLOBIN: 9.5 G/DL (ref 12–16)
INR BLD: 3.8
LYMPHOCYTES # BLD: 28 % (ref 24–44)
MAGNESIUM: 1.6 MG/DL (ref 1.6–2.6)
MCH RBC QN AUTO: 28.4 PG (ref 26–34)
MCHC RBC AUTO-ENTMCNC: 32.4 G/DL (ref 31–37)
MCV RBC AUTO: 87.5 FL (ref 80–100)
MONOCYTES # BLD: 8 % (ref 1–7)
PDW BLD-RTO: 18.4 % (ref 11.5–14.9)
PLATELET # BLD: 196 K/UL (ref 150–450)
PMV BLD AUTO: 8.2 FL (ref 6–12)
POTASSIUM SERPL-SCNC: 4.1 MMOL/L (ref 3.7–5.3)
PROTHROMBIN TIME: 37.6 SEC (ref 11.8–14.6)
RBC # BLD: 3.33 M/UL (ref 4–5.2)
REASON FOR REJECTION: NORMAL
SEG NEUTROPHILS: 64 % (ref 36–66)
SEGMENTED NEUTROPHILS ABSOLUTE COUNT: 3.4 K/UL (ref 1.3–9.1)
SODIUM BLD-SCNC: 138 MMOL/L (ref 135–144)
WBC # BLD: 5.3 K/UL (ref 3.5–11)
ZZ NTE CLEAN UP: ORDERED TEST: NORMAL
ZZ NTE WITH NAME CLEAN UP: SPECIMEN SOURCE: NORMAL

## 2022-03-22 PROCEDURE — 83735 ASSAY OF MAGNESIUM: CPT

## 2022-03-22 PROCEDURE — 93010 ELECTROCARDIOGRAM REPORT: CPT | Performed by: INTERNAL MEDICINE

## 2022-03-22 PROCEDURE — 85610 PROTHROMBIN TIME: CPT

## 2022-03-22 PROCEDURE — 6370000000 HC RX 637 (ALT 250 FOR IP): Performed by: FAMILY MEDICINE

## 2022-03-22 PROCEDURE — 2060000000 HC ICU INTERMEDIATE R&B

## 2022-03-22 PROCEDURE — 2580000003 HC RX 258: Performed by: EMERGENCY MEDICINE

## 2022-03-22 PROCEDURE — 36415 COLL VENOUS BLD VENIPUNCTURE: CPT

## 2022-03-22 PROCEDURE — 6360000002 HC RX W HCPCS: Performed by: FAMILY MEDICINE

## 2022-03-22 PROCEDURE — 85025 COMPLETE CBC W/AUTO DIFF WBC: CPT

## 2022-03-22 PROCEDURE — 80048 BASIC METABOLIC PNL TOTAL CA: CPT

## 2022-03-22 PROCEDURE — 2500000003 HC RX 250 WO HCPCS: Performed by: FAMILY MEDICINE

## 2022-03-22 RX ADMIN — PRAVASTATIN SODIUM 40 MG: 40 TABLET ORAL at 22:08

## 2022-03-22 RX ADMIN — ALLOPURINOL 300 MG: 300 TABLET ORAL at 08:01

## 2022-03-22 RX ADMIN — METOPROLOL TARTRATE 50 MG: 50 TABLET, FILM COATED ORAL at 08:01

## 2022-03-22 RX ADMIN — ONDANSETRON 4 MG: 2 INJECTION INTRAMUSCULAR; INTRAVENOUS at 06:31

## 2022-03-22 RX ADMIN — ANTI-FUNGAL POWDER MICONAZOLE NITRATE TALC FREE: 1.42 POWDER TOPICAL at 08:02

## 2022-03-22 RX ADMIN — ANTI-FUNGAL POWDER MICONAZOLE NITRATE TALC FREE: 1.42 POWDER TOPICAL at 22:14

## 2022-03-22 RX ADMIN — LISINOPRIL 20 MG: 20 TABLET ORAL at 08:01

## 2022-03-22 RX ADMIN — SODIUM CHLORIDE: 9 INJECTION, SOLUTION INTRAVENOUS at 02:00

## 2022-03-22 RX ADMIN — SODIUM CHLORIDE: 9 INJECTION, SOLUTION INTRAVENOUS at 14:50

## 2022-03-22 RX ADMIN — CLINDAMYCIN PHOSPHATE 300 MG: 300 INJECTION, SOLUTION INTRAVENOUS at 22:07

## 2022-03-22 RX ADMIN — ASPIRIN 325 MG ORAL TABLET 325 MG: 325 PILL ORAL at 08:01

## 2022-03-22 RX ADMIN — HYDROCHLOROTHIAZIDE 12.5 MG: 25 TABLET ORAL at 08:01

## 2022-03-22 RX ADMIN — CLINDAMYCIN PHOSPHATE 300 MG: 300 INJECTION, SOLUTION INTRAVENOUS at 08:03

## 2022-03-22 RX ADMIN — PANTOPRAZOLE SODIUM 40 MG: 40 TABLET, DELAYED RELEASE ORAL at 06:31

## 2022-03-22 RX ADMIN — OXYBUTYNIN CHLORIDE 10 MG: 10 TABLET, EXTENDED RELEASE ORAL at 08:01

## 2022-03-22 NOTE — PROGRESS NOTES
chloride **OR** potassium alternative oral replacement **OR** potassium chloride, ondansetron **OR** ondansetron, acetaminophen, ondansetron, sodium chloride flush, sodium chloride, acetaminophen, HYDROcodone 5 mg - acetaminophen    Data:     Past Medical History:   has a past medical history of Aneurysm, cerebral, Arthritis, Breast cancer ([facility] [address] Diabetes mellitus ([facility] [address] H/O transfusion, Hyperlipidemia, Hypertension, Porphyria ([facility] [address] and Wears glasses. Social History:   reports that she quit smoking about 17 years ago. Her smoking use included cigarettes. She started smoking about 59 years ago. She smoked 1.00 pack per day. She has never used smokeless tobacco. She reports that she does not drink alcohol and does not use drugs. Family History:   Family History   Problem Relation Age of Onset    Heart Disease Mother     Kidney Disease Mother         One kindney    Other Father         DROWN    Heart Attack Brother     No Known Problems Maternal Grandmother     No Known Problems Maternal Grandfather     Diabetes Paternal Grandmother     No Known Problems Paternal Grandfather     Colon Cancer Brother     High Blood Pressure Son     Arthritis Son        Vitals:  /71   Pulse 97   Temp 97.9 °F (36.6 °C) (Axillary)   Resp 20   Wt (!) 316 lb 12.8 oz (143.7 kg)   LMP 2011 (Approximate)   SpO2 97%   BMI 48.17 kg/m²   Temp (24hrs), Av °F (36.7 °C), Min:97.5 °F (36.4 °C), Max:98.4 °F (36.9 °C)    No results for input(s): POCGLU in the last 72 hours. I/O (24Hr):     Intake/Output Summary (Last 24 hours) at 3/22/2022 0838  Last data filed at 3/22/2022 9169  Gross per 24 hour   Intake 120 ml   Output 675 ml   Net -555 ml       Labs:  Hematology:  Recent Labs     22  1046 22  1046 22  0854 22  0607 22  1111 22  0510 22  0623   WBC 7.6   < > 5.3 4.8  --  5.3  --    RBC 3.51*   < > 3.41* 3.41*  --  3.33*  --    HGB 10.0*   < > 9.9* 9.9*  --  9.5*  -- HCT 31.2*   < > 30.3* 30.0*  --  29.1*  --    MCV 88.9   < > 88.7 88.0  --  87.5  --    MCH 28.4   < > 28.9 28.9  --  28.4  --    MCHC 31.9   < > 32.6 32.8  --  32.4  --    RDW 18.6*   < > 19.0* 18.5*  --  18.4*  --       < > 168 188  --  196  --    MPV 8.1   < > 8.2 7.9  --  8.2  --    SEDRATE  --   --  52*  --   --   --   --    CRP  --   --  79.5*  --   --   --   --    INR 2.8  --   --   --  3.8  --  3.8    < > = values in this interval not displayed. Chemistry:  Recent Labs     03/19/22  1046 03/19/22  1046 03/19/22  1430 03/20/22  0854 03/20/22  0854 03/20/22  2107 03/21/22  0607 03/22/22  0510      < >  --  139  --   --  135 138   K 3.3*   < >  --  3.3*   < > 3.8 4.0 4.1      < >  --  104  --   --  101 106   CO2 23   < >  --  20  --   --  21 20   GLUCOSE 120*   < >  --  127*  --   --  109* 116*   BUN 20   < >  --  18  --   --  13 10   CREATININE 2.24*   < >  --  1.85*  --   --  1.62* 1.49*   MG 1.0*   < >  --  1.3*   < > 2.1 2.0 1.6   ANIONGAP 16   < >  --  15  --   --  13 12   LABGLOM 21*   < >  --  27*  --   --  31* 34*   GFRAA 26*   < >  --  32*  --   --  38* 41*   CALCIUM 7.6*   < >  --  7.4*  --   --  7.6* 7.9*   TROPHS 27*  --  24*  --   --   --   --   --     < > = values in this interval not displayed. Recent Labs     03/19/22  1046   PROT 6.3*   LABALBU 3.6   AST 15   ALT 9   ALKPHOS 60   BILITOT 0.78     ABG:No results found for: POCPH, PHART, PH, POCPCO2, QGX3EBI, PCO2, POCPO2, PO2ART, PO2, POCHCO3, VOS3VLW, HCO3, NBEA, PBEA, BEART, BE, THGBART, THB, VRC2WRI, KKAX4SBV, N9BZKLKQ, O2SAT, FIO2  Lab Results   Component Value Date/Time    SPECIAL NOT REPORTED 07/27/2021 09:36 AM     Lab Results   Component Value Date/Time    CULTURE NO SIGNIFICANT GROWTH 07/27/2021 09:36 AM       Radiology:  CT ABDOMEN PELVIS WO CONTRAST Additional Contrast? None    Result Date: 3/18/2022  1. No acute findings in the abdomen or pelvis to account for the patient's pain.  2. Colonic diverticulosis. 3. The previously noted urethral diverticulum is less conspicuous. 4. Indeterminate proteinaceous cysts in the left kidney with the largest in the lower pole measuring 1.3 cm. Dedicated renal CT or MRI could be performed for further evaluation. CT HEAD WO CONTRAST    Result Date: 3/19/2022  No acute intracranial abnormality. Redemonstration of metallic coils in the left cavernous area. Redemonstration of left internal carotid artery stent in this area. Redemonstration of atrophy and small-vessel disease ischemic changes. MRA HEAD WO CONTRAST    Result Date: 3/19/2022  1. No acute intracranial abnormality. No acute infarct. 2. 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 3. Mild-to-moderate global parenchymal volume loss with moderate chronic microvascular ischemic changes. 4. Susceptibility in the region of the left internal carotid artery due to the stent assisted coiling limits evaluation of the left internal carotid artery. 5. The distal branches of the left PCA are not well visualized, which may be partially due to motion. 6. Otherwise, no significant stenosis is seen of the [id number]-hg-Ergxvq. 7. No convincing intracranial aneurysm. MRI BRAIN WO CONTRAST    Result Date: 3/19/2022  1. No acute intracranial abnormality. No acute infarct. 2. 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 3. Mild-to-moderate global parenchymal volume loss with moderate chronic microvascular ischemic changes. 4. Susceptibility in the region of the left internal carotid artery due to the stent assisted coiling limits evaluation of the left internal carotid artery. 5. The distal branches of the left PCA are not well visualized, which may be partially due to motion. 6. Otherwise, no significant stenosis is seen of the fjuzmc-zc-Djvaio. 7. No convincing intracranial aneurysm.        Physical Examination:        General appearance:  alert, cooperative and no distress  Mental Status:  oriented to person, place and time and normal affect  Lungs:  clear to auscultation bilaterally, normal effort  Heart:  regular rate and rhythm, no murmur  Abdomen:  soft, nontender, nondistended, normal bowel sounds,   Extremities:   Lower extremity erythema and edema present    Assessment:        Hospital Problems           Last Modified POA    * (Principal) Hypomagnesemia 3/19/2022 Yes          Plan:        1.  diarrhea-  Improving  2.  lower extremity cellulitis-  Improving. Continue IV clindamycin Day 3 today. continue IV fluid  3.  AFib-  Metoprolol. Warfarin. Pharmacy to dose warfarin  4.  hypertension-  Lisinopril 20 mg, HCTZ 12.5 mg, metoprolol  5.  hyperlipidemia-  Pravastatin 40 mg. Also on aspirin  6.   GERD-  Pantoprazole  7.  hypomagnesemia-  Improved  8. DC planning    Argentina Whitman MD  3/22/2022  8:38 AM

## 2022-03-22 NOTE — CARE COORDINATION
ONGOING DISCHARGE PLAN:    Patient is alert and oriented x4. Spoke with patient regarding discharge plan and patient confirms that plan is still  to DC to home alone.      Denies VNS.     Remains on IV Cleocin.      INR today 3.8, on Coumadin, follows at [facility].      Cr 1.49, Mg 1.6. Anticipate DC florencia. Will continue to follow for additional discharge needs.     Electronically signed by Leighann Prieto RN on 3/22/2022 at 3:53 PM

## 2022-03-22 NOTE — PROGRESS NOTES
Pharmacy Note  Warfarin Consult follow-up      Recent Labs     03/19/22  1046 03/21/22  1111 03/22/22  0623   INR 2.8 3.8 3.8     Recent Labs     03/20/22  0854 03/21/22  0607 03/22/22  0510   HGB 9.9* 9.9* 9.5*   HCT 30.3* 30.0* 29.1*    188 196       Significant Drug-Drug Interactions:  New warfarin drug-drug interactions: none  Discontinued drug-drug interactions: none  Current warfarin drug-drug interactions: clindamycin, pravastatin      Date             INR        Dose given previous day  Dose scheduled for today  3/22/2022            3.8       none           none        Notes:                   INR -  3.8, no warfarin today. Daily PT/INR while inpatient. Wale Gonzalez Pharm. [address]

## 2022-03-22 NOTE — FLOWSHEET NOTE
03/22/22 1344   Encounter Summary   Services provided to: Patient   Referral/Consult From: Marisol   Continue Visiting   (3-22-22)   Complexity of Encounter Low   Length of Encounter 15 minutes   Spiritual Assessment Completed Yes   Routine   Type Initial   Assessment Calm; Approachable   Intervention Active listening;Explored feelings, thoughts, concerns;Sustaining presence/ Ministry of presence; Discussed illness/injury and it's impact   Outcome Expressed gratitude;Engaged in conversation;Expressed feelings/needs/concerns;Coping

## 2022-03-22 NOTE — PLAN OF CARE
Problem: Pain:  Goal: Pain level will decrease  Description: Pain level will decrease  Outcome: Met This Shift  Goal: Control of acute pain  Description: Control of acute pain  Outcome: Met This Shift  Goal: Control of chronic pain  Description: Control of chronic pain  Outcome: Met This Shift     Problem: Skin Integrity:  Goal: Will show no infection signs and symptoms  Description: Will show no infection signs and symptoms  Outcome: Met This Shift  Note: No fevers noted   Goal: Absence of new skin breakdown  Description: Absence of new skin breakdown  Outcome: Met This Shift  Note: Skin integrity unchanged      Problem: Physical Regulation:  Goal: Will show no signs and symptoms of electrolyte imbalance  Description: Will show no signs and symptoms of electrolyte imbalance  Outcome: Met This Shift     Problem: Falls - Risk of:  Goal: Will remain free from falls  Description: Will remain free from falls  Outcome: Met This Shift  Note: Bed alarm on   Goal: Absence of physical injury  Description: Absence of physical injury  Outcome: Met This Shift

## 2022-03-22 NOTE — PROGRESS NOTES
RN mentioned possibility of working with PT today. Pt refusing, saying she has had PT before and it never helps. Discussed that pt lives alone and the safety of continuing that, pt reports that her COPD is the reason she has difficulty moving around, not physical limitations.  RN will attempt to reinforce benefits of PT.

## 2022-03-23 VITALS
TEMPERATURE: 97.7 F | HEART RATE: 95 BPM | BODY MASS INDEX: 48.17 KG/M2 | OXYGEN SATURATION: 96 % | SYSTOLIC BLOOD PRESSURE: 119 MMHG | WEIGHT: 293 LBS | DIASTOLIC BLOOD PRESSURE: 65 MMHG | RESPIRATION RATE: 18 BRPM

## 2022-03-23 LAB
ABSOLUTE EOS #: 0 K/UL (ref 0–0.4)
ABSOLUTE LYMPH #: 1.16 K/UL (ref 1–4.8)
ABSOLUTE MONO #: 0.32 K/UL (ref 0.1–1.3)
ANION GAP SERPL CALCULATED.3IONS-SCNC: 10 MMOL/L (ref 9–17)
BASOPHILS # BLD: 0 % (ref 0–2)
BASOPHILS ABSOLUTE: 0 K/UL (ref 0–0.2)
BUN BLDV-MCNC: 9 MG/DL (ref 8–23)
CALCIUM IONIZED: 1.11 MMOL/L (ref 1.13–1.33)
CALCIUM SERPL-MCNC: 7.7 MG/DL (ref 8.6–10.4)
CHLORIDE BLD-SCNC: 107 MMOL/L (ref 98–107)
CO2: 20 MMOL/L (ref 20–31)
CREAT SERPL-MCNC: 1.4 MG/DL (ref 0.5–0.9)
EOSINOPHILS RELATIVE PERCENT: 0 % (ref 0–4)
GFR AFRICAN AMERICAN: 45 ML/MIN
GFR NON-AFRICAN AMERICAN: 37 ML/MIN
GFR SERPL CREATININE-BSD FRML MDRD: ABNORMAL ML/MIN/{1.73_M2}
GLUCOSE BLD-MCNC: 123 MG/DL (ref 70–99)
HCT VFR BLD CALC: 26.7 % (ref 36–46)
HEMOGLOBIN: 8.6 G/DL (ref 12–16)
INR BLD: 3.4
LYMPHOCYTES # BLD: 29 % (ref 24–44)
MAGNESIUM: 1.3 MG/DL (ref 1.6–2.6)
MAGNESIUM: 2.1 MG/DL (ref 1.6–2.6)
MCH RBC QN AUTO: 28.6 PG (ref 26–34)
MCHC RBC AUTO-ENTMCNC: 32.2 G/DL (ref 31–37)
MCV RBC AUTO: 88.6 FL (ref 80–100)
MONOCYTES # BLD: 8 % (ref 1–7)
MORPHOLOGY: ABNORMAL
PDW BLD-RTO: 18.8 % (ref 11.5–14.9)
PLATELET # BLD: 174 K/UL (ref 150–450)
PMV BLD AUTO: 7.7 FL (ref 6–12)
POTASSIUM SERPL-SCNC: 3.8 MMOL/L (ref 3.7–5.3)
PROTHROMBIN TIME: 34 SEC (ref 11.8–14.6)
RBC # BLD: 3.01 M/UL (ref 4–5.2)
SEG NEUTROPHILS: 63 % (ref 36–66)
SEGMENTED NEUTROPHILS ABSOLUTE COUNT: 2.52 K/UL (ref 1.3–9.1)
SODIUM BLD-SCNC: 137 MMOL/L (ref 135–144)
WBC # BLD: 4 K/UL (ref 3.5–11)

## 2022-03-23 PROCEDURE — 6370000000 HC RX 637 (ALT 250 FOR IP): Performed by: INTERNAL MEDICINE

## 2022-03-23 PROCEDURE — 6360000002 HC RX W HCPCS: Performed by: INTERNAL MEDICINE

## 2022-03-23 PROCEDURE — 83735 ASSAY OF MAGNESIUM: CPT

## 2022-03-23 PROCEDURE — 85025 COMPLETE CBC W/AUTO DIFF WBC: CPT

## 2022-03-23 PROCEDURE — 85610 PROTHROMBIN TIME: CPT

## 2022-03-23 PROCEDURE — 82330 ASSAY OF CALCIUM: CPT

## 2022-03-23 PROCEDURE — 6370000000 HC RX 637 (ALT 250 FOR IP): Performed by: FAMILY MEDICINE

## 2022-03-23 PROCEDURE — 2500000003 HC RX 250 WO HCPCS: Performed by: FAMILY MEDICINE

## 2022-03-23 PROCEDURE — 36415 COLL VENOUS BLD VENIPUNCTURE: CPT

## 2022-03-23 PROCEDURE — 2580000003 HC RX 258: Performed by: EMERGENCY MEDICINE

## 2022-03-23 PROCEDURE — 2500000003 HC RX 250 WO HCPCS: Performed by: INTERNAL MEDICINE

## 2022-03-23 PROCEDURE — 80048 BASIC METABOLIC PNL TOTAL CA: CPT

## 2022-03-23 RX ORDER — CALCIUM GLUCONATE 10 MG/ML
1000 INJECTION, SOLUTION INTRAVENOUS ONCE
Status: COMPLETED | OUTPATIENT
Start: 2022-03-23 | End: 2022-03-23

## 2022-03-23 RX ORDER — CALCIUM GLUCONATE 94 MG/ML
1000 INJECTION, SOLUTION INTRAVENOUS ONCE
Status: DISCONTINUED | OUTPATIENT
Start: 2022-03-23 | End: 2022-03-23

## 2022-03-23 RX ORDER — ADHESIVE TAPE 3"X 2.3 YD
200 TAPE, NON-MEDICATED TOPICAL DAILY
Qty: 30 TABLET | Refills: 0 | Status: SHIPPED | OUTPATIENT
Start: 2022-03-23

## 2022-03-23 RX ORDER — CLINDAMYCIN HYDROCHLORIDE 300 MG/1
300 CAPSULE ORAL 3 TIMES DAILY
Qty: 9 CAPSULE | Refills: 0 | Status: SHIPPED | OUTPATIENT
Start: 2022-03-23 | End: 2022-03-26

## 2022-03-23 RX ORDER — ONDANSETRON 4 MG/1
4 TABLET, ORALLY DISINTEGRATING ORAL EVERY 8 HOURS PRN
Qty: 20 TABLET | Refills: 0 | Status: CANCELLED | OUTPATIENT
Start: 2022-03-23

## 2022-03-23 RX ADMIN — CALCIUM GLUCONATE 1000 MG: 10 INJECTION, SOLUTION INTRAVENOUS at 13:57

## 2022-03-23 RX ADMIN — MAGNESIUM SULFATE HEPTAHYDRATE 1000 MG: 1 INJECTION, SOLUTION INTRAVENOUS at 11:24

## 2022-03-23 RX ADMIN — ACETAMINOPHEN 650 MG: 325 TABLET ORAL at 00:33

## 2022-03-23 RX ADMIN — CLINDAMYCIN PHOSPHATE 300 MG: 300 INJECTION, SOLUTION INTRAVENOUS at 08:08

## 2022-03-23 RX ADMIN — ALLOPURINOL 300 MG: 300 TABLET ORAL at 08:44

## 2022-03-23 RX ADMIN — PANTOPRAZOLE SODIUM 40 MG: 40 TABLET, DELAYED RELEASE ORAL at 06:09

## 2022-03-23 RX ADMIN — HYDROCHLOROTHIAZIDE 12.5 MG: 25 TABLET ORAL at 08:44

## 2022-03-23 RX ADMIN — ASPIRIN 325 MG ORAL TABLET 325 MG: 325 PILL ORAL at 08:44

## 2022-03-23 RX ADMIN — OXYBUTYNIN CHLORIDE 10 MG: 10 TABLET, EXTENDED RELEASE ORAL at 08:44

## 2022-03-23 RX ADMIN — ONDANSETRON 4 MG: 4 TABLET, ORALLY DISINTEGRATING ORAL at 10:05

## 2022-03-23 RX ADMIN — ANTI-FUNGAL POWDER MICONAZOLE NITRATE TALC FREE: 1.42 POWDER TOPICAL at 08:51

## 2022-03-23 RX ADMIN — METOPROLOL TARTRATE 50 MG: 50 TABLET, FILM COATED ORAL at 08:44

## 2022-03-23 RX ADMIN — MAGNESIUM SULFATE HEPTAHYDRATE 1000 MG: 1 INJECTION, SOLUTION INTRAVENOUS at 10:03

## 2022-03-23 RX ADMIN — MAGNESIUM SULFATE HEPTAHYDRATE 1000 MG: 1 INJECTION, SOLUTION INTRAVENOUS at 08:45

## 2022-03-23 RX ADMIN — MAGNESIUM SULFATE HEPTAHYDRATE 1000 MG: 1 INJECTION, SOLUTION INTRAVENOUS at 12:32

## 2022-03-23 RX ADMIN — LISINOPRIL 20 MG: 20 TABLET ORAL at 08:44

## 2022-03-23 RX ADMIN — SODIUM CHLORIDE: 9 INJECTION, SOLUTION INTRAVENOUS at 02:26

## 2022-03-23 ASSESSMENT — PAIN DESCRIPTION - LOCATION: LOCATION: NECK

## 2022-03-23 ASSESSMENT — PAIN SCALES - GENERAL
PAINLEVEL_OUTOF10: 4
PAINLEVEL_OUTOF10: 4
PAINLEVEL_OUTOF10: 0

## 2022-03-23 ASSESSMENT — PAIN DESCRIPTION - PAIN TYPE: TYPE: ACUTE PAIN

## 2022-03-23 ASSESSMENT — PAIN DESCRIPTION - DESCRIPTORS: DESCRIPTORS: DISCOMFORT

## 2022-03-23 NOTE — PROGRESS NOTES
Physical Therapy        Physical Therapy Cancel Note      DATE: 3/23/2022    NAME: Wilma Aquino  MRN: [id number]   : 1945      Patient not seen this date for Physical Therapy due to:    3- at [phone]- Pt adamantly refused PT evaluation. Case discussed w/ nurse [facility].       Electronically signed by Luann Velarde PT on 3/23/2022 at 2:52 PM

## 2022-03-23 NOTE — PROGRESS NOTES
Pharmacy Note  Warfarin Consult follow-up      Recent Labs     03/21/22  1111 03/22/22  0623 03/23/22  0507   INR 3.8 3.8 3.4     Recent Labs     03/21/22  0607 03/22/22  0510 03/23/22  0507   HGB 9.9* 9.5* 8.6*   HCT 30.0* 29.1* 26.7*    196 174       Significant Drug-Drug Interactions:  New warfarin drug-drug interactions: none  Discontinued drug-drug interactions: none  Current warfarin drug-drug interactions: clindamycin      Date             INR        Dose given previous day  Dose scheduled for today  3/23/2022            3.4       none           none        Notes:                   INR - 3.4, no warfarin today. Daily PT/INR while inpatient. Julianna Castañeda Pharm. [address] [facility]

## 2022-03-23 NOTE — CARE COORDINATION
ONGOING DISCHARGE PLAN:    Patient is alert and oriented x4. Spoke with patient regarding discharge plan and patient confirms that plan is still  to DC to home alone.      Denies VNS.     Remains on IV Cleocin.      INR today 3.4, on Coumadin, follows at [facility].      Cr 1.40, Mg 1.3,Calcium, 7.7. Replacing.     Anticipate DC today. Will continue to follow for additional discharge needs.     Electronically signed by Briseyda Billings RN on 3/23/2022 at 1:05 PM

## 2022-03-23 NOTE — DISCHARGE INSTR - COC
Continuity of Care Form    Patient Name: Rajiv Peña   :  1945  MRN:  [id number]    Admit date:  3/19/2022  Discharge date:  ***    Code Status Order: Full Code   Advance Directives:      Admitting Physician:  Jim Gilliland MD  PCP: Ronel Alvares    Discharging Nurse: [facility] Unit/Room#: 2116/2116-01  Discharging Unit Phone Number: ***    Emergency Contact:   Extended Emergency Contact Information  Primary Emergency Contact: [address] [facility] Phone: [phone]  Work Phone: [phone]  Mobile Phone: [phone]  Relation: Child  Secondary Emergency Contact: Tiburcio Joyner, [address] [place] [address] Phone: [phone]  Relation: Child    Past Surgical History:  Past Surgical History:   Procedure Laterality Date    BREAST LUMPECTOMY Left     Chemo & Radiation    COLONOSCOPY N/A 2019    COLONOSCOPY POLYPECTOMY HOT BIOPSY performed by Merissa Colby MD at . 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    MRI-safe endovascular aneurysm coil present Z95.828    Morbid obesity with BMI of 40.0-44.9, adult ([address] E66.01, Z68.41    Aneurysm ([address] I72.9    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    Hypomagnesemia E83.42       Isolation/Infection:   Isolation            No Isolation          Patient Infection Status Infection Onset Added Last Indicated Last Indicated By Review Planned Expiration Resolved Resolved By    None active    Resolved    C-diff Rule Out 03/20/22 03/20/22 03/20/22 C DIFF TOXIN/ANTIGEN (Ordered)   03/21/22 Rule-Out Test Resulted    COVID-19 (Rule Out) 03/18/22 03/18/22 03/18/22 COVID-19 & Influenza Combo (Ordered)   03/18/22 Rule-Out Test Resulted            Nurse Assessment:  Last Vital Signs: BP (!) 124/93   Pulse 110   Temp 97.9 °F (36.6 °C) (Oral)   Resp 18   Wt (!) 316 lb 12.8 oz (143.7 kg)   LMP 12/20/2011 (Approximate)   SpO2 95%   BMI 48.17 kg/m²     Last documented pain score (0-10 scale): Pain Level: 0  Last Weight:   Wt Readings from Last 1 Encounters:   03/22/22 (!) 316 lb 12.8 oz (143.7 kg)     Mental Status:  {IP PT MENTAL STATUS:50898}    IV Access:  { KLARISSA IV ACCESS:274649772}    Nursing Mobility/ADLs:  Walking   {CHP DME LBAK:523521477}  Transfer  {CHP DME IKAO:277279261}  Bathing  {CHP DME MUUD:442553179}  Dressing  {CHP DME TSDC:318657026}  Toileting  {CHP DME HXTU:970360119}  Feeding  {CHP DME NXDD:475956899}  Med Admin  {CHP DME QWCN:552130840}  Med Delivery   { KLARISSA MED Delivery:800916656}    Wound Care Documentation and Therapy:        Elimination:  Continence: Bowel: {YES / KQ:70682}  Bladder: {YES / HW:88328}  Urinary Catheter: {Urinary Catheter:545408947}   Colostomy/Ileostomy/Ileal Conduit: {YES / JE:22557}       Date of Last BM: ***    Intake/Output Summary (Last 24 hours) at 3/23/2022 1157  Last data filed at 3/23/2022 1124  Gross per 24 hour   Intake 530 ml   Output 800 ml   Net -270 ml     I/O last 3 completed shifts:   In: 650 [P.O.:650]  Out: 950 [Urine:950]    Safety Concerns:     [address] KLARISSA Safety Concerns:284906842}    Impairments/Disabilities:      [address] Impairments/Disabilities:888201817}    Nutrition Therapy:  Current Nutrition Therapy:   [address] Diet List:268608490}    Routes of Feeding: {CHP DME Other Feedings:051430613}  Liquids: {Slp liquid thickness:04020}  Daily Fluid Restriction: {CHP DME Yes amt example:274013508}  Last Modified Barium Swallow with Video (Video Swallowing Test): {Done Not Done QDMF:423836474}    Treatments at the Time of Hospital Discharge:   Respiratory Treatments: ***  Oxygen Therapy:  {Therapy; copd oxygen:98539}  Ventilator:    {MH CC Vent FPTN:699123040}    Rehab Therapies: {THERAPEUTIC INTERVENTION:1457580027}  Weight Bearing Status/Restrictions: { CC Weight Bearin}  Other Medical Equipment (for information only, NOT a DME order):  {EQUIPMENT:432957435}  Other Treatments: ***    Patient's personal belongings (please select all that are sent with patient):  {CHP DME Belongings:356751333}    RN SIGNATURE:  {Esignature:454629193}    CASE MANAGEMENT/SOCIAL WORK SECTION    Inpatient Status Date: ***    Readmission Risk Assessment Score:  Readmission Risk              Risk of Unplanned Readmission:  22           Discharging to Facility/ Agency   Name:   Address:  Phone:  Fax:    Dialysis Facility (if applicable)   Name:  Address:  Dialysis Schedule:  Phone:  Fax:    / signature: {Esignature:028653804}    PHYSICIAN SECTION    Prognosis: {Prognosis:3035871334}    Condition at Discharge: [facility] Patient Condition:384383729}    Rehab Potential (if transferring to Rehab): {Prognosis:7758285469}    Recommended Labs or Other Treatments After Discharge: ***    Physician Certification: I certify the above information and transfer of Chet Nicholas  is necessary for the continuing treatment of the diagnosis listed and that she requires {Admit to Appropriate Level of Care:08042} for {GREATER/LESS:657842810} 30 days.      Update Admission H&P: {CHP DME Changes in [facility]:869657604}    PHYSICIAN SIGNATURE:  Electronically signed by Argentina Whitman MD on 3/23/2022 at 11:57 AM

## 2022-03-23 NOTE — PROGRESS NOTES
RN entered room to do pts discharge paperwork. Pts daughter in law was upset and expressed concern over pts legs being reddened still. Pts daughter in law stated \"that antibiotic is not working\". RN assured her the the physician saw the patient today and has medically cleared her. Also discussed how pt is going home on oral antibiotics and that the cellulitis is in the process of being treated still but no longer needs to stay in the hospital for IV antibiotics. Pts daughter in law responded \"you know Evelin Blake happen? Rashmi Certain go home, and tomorrow we are gonna be right back. \" RN continues to reinforce education on being medically cleared from needing inpatient hospitalization.

## 2022-03-23 NOTE — PROGRESS NOTES
[facility]  Occupational Therapy    Date: 3/23/2022  Patient Name: Parth Alvarez        : 1945       [x] Pt Refusal    pt refusing therapy, states she has had therapy before and does not want therapy, ed re need due to pt report she is needing assist to get out of bed, BSC seen in room. Ed re need to keep strong and safe with indep adl mobility. pt continues to adamantly refuse post ed. pt thinks she will be d/c today. continue to check for OT needs while here.      [] Pt Unavailable due to:          Breanna Parker, OT,  Date: 3/23/2022

## 2022-03-23 NOTE — PLAN OF CARE
Problem: Pain:  Goal: Pain level will decrease  Description: Pain level will decrease  3/23/2022 0300 by Joe Soto RN  Outcome: Ongoing  3/22/2022 1617 by Abbey Vieyra RN  Outcome: Met This Shift  Note: Pt denied pain this shift. Goal: Control of acute pain  Description: Control of acute pain  Outcome: Ongoing  Note: Tylenol effective for c/o headache, will continue to monitor  Goal: Control of chronic pain  Description: Control of chronic pain  Outcome: Ongoing  Note: PRN narcotic effective for c/o chronic leg pain     Problem: Skin Integrity:  Goal: Will show no infection signs and symptoms  Description: Will show no infection signs and symptoms  3/23/2022 0300 by Joe Soto RN  Outcome: Ongoing  Note: Patient on IV antibiotics for treatment of cellulitis  3/22/2022 1617 by Abbey Vieyra RN  Outcome: Ongoing  Note: Pt has BLE redness and swelling from cellulitis. Otherwise stable skin  Goal: Absence of new skin breakdown  Description: Absence of new skin breakdown  Outcome: Ongoing     Problem: Physical Regulation:  Goal: Ability to maintain clinical measurements within normal limits will improve  Description: Ability to maintain clinical measurements within normal limits will improve  Outcome: Ongoing     Problem: Falls - Risk of:  Goal: Will remain free from falls  Description: Will remain free from falls  3/23/2022 0300 by Joe Soto RN  Outcome: Ongoing  Note: Fall precautions in place, patient free from injuries.  Patient educated about safety precautions, will continue to monitor    3/22/2022 1617 by Abbey Vieyra RN  Outcome: Ongoing  Note: Pt calls out appropriately   Goal: Absence of physical injury  Description: Absence of physical injury  Outcome: Ongoing

## 2022-03-23 NOTE — PROGRESS NOTES
Patient educated on discharge instructions which include: medication schedule, reasons for new medications and some side effects and need to follow-up. Patient given new prescriptions during teaching. Patient verbalizes understanding of discharge and states readiness for discharge. All belongings were gathered by patient and in patient's possession. No distress noted at this time.      Current vital signs:  /65   Pulse 95   Temp 97.7 °F (36.5 °C) (Oral)   Resp 18   Wt (!) 316 lb 12.8 oz (143.7 kg)   LMP 12/20/2011 (Approximate)   SpO2 96%   BMI 48.17 kg/m²                MEWS Score: 2

## 2022-03-23 NOTE — PROGRESS NOTES
Patient found walking from bedside commode back to bed without staff assistance. Patient was steady with walker, educated patient to call if she needs assistance and take time changing position.

## 2022-03-23 NOTE — PROGRESS NOTES
[facility]    Progress Note    3/23/2022    11:53 AM    Name:   Anastasia Fang  MRN:     [id number]     Acct:      [de-identified]   Room:   [address] [place] [address] Day:  4  Admit Date:  3/19/2022 10:30 AM    PCP:   Rebecca Norton  Code Status:  Full Code    Subjective:     C/C:   Chief Complaint   Patient presents with    Headache    Dizziness     Interval History Status: improved. Patient admitted with diarrhea, AFib, lower extremity cellulitis   Doing better overall. Diarrhea resolved. Patient states that she is doing well and is requesting discharge  Afebrile  BP stable  Respiratory status stable on room air  No leucocytosis  Hemoglobin  Dropped from 9.5-8.6  Platelets stable  Renal function improved from 1.85-1.6 - 1.49-1.4    Review of Systems:     Constitutional:  negative for chills, fevers, sweats  Respiratory:  negative for cough, dyspnea on exertion, wheezing  Cardiovascular:  negative for  Loss of consciousness, palpitations  Gastrointestinal:  negative for abdominal pain, constipation,  nausea, vomiting  Neurological:  negative for dizziness, headache    Medications: Allergies:     Allergies   Allergen Reactions    Dicloxacillin Hives    Pcn [Penicillins] Hives    Sulfa Antibiotics        Current Meds:   Scheduled Meds:    warfarin placeholder: dosing by pharmacy   Other RX Placeholder    clindamycin (CLEOCIN) IV  300 mg IntraVENous Q12H    miconazole   Topical BID    allopurinol  300 mg Oral Daily    aspirin  325 mg Oral Daily    metoprolol tartrate  50 mg Oral BID    pantoprazole  40 mg Oral QAM AC    oxybutynin  1 tablet Oral Daily    pravastatin  40 mg Oral Nightly    lisinopril  20 mg Oral Daily    And    hydroCHLOROthiazide  12.5 mg Oral Daily    sodium chloride flush  5-40 mL IntraVENous 2 times per day     Continuous Infusions:    sodium chloride 100 mL/hr at 03/23/22 0226    sodium chloride       PRN Meds: metoprolol, magnesium sulfate, potassium chloride **OR** potassium alternative oral replacement **OR** potassium chloride, ondansetron **OR** ondansetron, acetaminophen, ondansetron, sodium chloride flush, sodium chloride, acetaminophen, HYDROcodone 5 mg - acetaminophen    Data:     Past Medical History:   has a past medical history of Aneurysm, cerebral, Arthritis, Breast cancer ([facility] Diabetes mellitus ([facility] H/O transfusion, Hyperlipidemia, Hypertension, Porphyria ([facility] and Wears glasses. Social History:   reports that she quit smoking about 17 years ago. Her smoking use included cigarettes. She started smoking about 59 years ago. She smoked 1.00 pack per day. She has never used smokeless tobacco. She reports that she does not drink alcohol and does not use drugs. Family History:   Family History   Problem Relation Age of Onset    Heart Disease Mother     Kidney Disease Mother         One kindney    Other Father         DROWN    Heart Attack Brother     No Known Problems Maternal Grandmother     No Known Problems Maternal Grandfather     Diabetes Paternal Grandmother     No Known Problems Paternal Grandfather     Colon Cancer Brother     High Blood Pressure Son     Arthritis Son        Vitals:  BP (!) 124/93   Pulse 110   Temp 97.9 °F (36.6 °C) (Oral)   Resp 18   Wt (!) 316 lb 12.8 oz (143.7 kg)   LMP 2011 (Approximate)   SpO2 95%   BMI 48.17 kg/m²   Temp (24hrs), Av.5 °F (36.9 °C), Min:97.7 °F (36.5 °C), Max:99.3 °F (37.4 °C)    No results for input(s): POCGLU in the last 72 hours. I/O (24Hr):     Intake/Output Summary (Last 24 hours) at 3/23/2022 1153  Last data filed at 3/23/2022 1124  Gross per 24 hour   Intake 530 ml   Output 800 ml   Net -270 ml       Labs:  Hematology:  Recent Labs     22  0607 22  1111 22  0510 22  0623 22  0507   WBC 4.8  --  5.3  --  4.0   RBC 3.41*  --  3.33*  --  3.01*   HGB 9.9*  --  9.5*  --  8.6*   HCT 30.0*  --  29.1*  -- 26.7*   MCV 88.0  --  87.5  --  88.6   MCH 28.9  --  28.4  --  28.6   MCHC 32.8  --  32.4  --  32.2   RDW 18.5*  --  18.4*  --  18.8*     --  196  --  174   MPV 7.9  --  8.2  --  7.7   INR  --  3.8  --  3.8 3.4     Chemistry:  Recent Labs     03/21/22  0607 03/22/22  0510 03/23/22  0507    138 137   K 4.0 4.1 3.8    106 107   CO2 21 20 20   GLUCOSE 109* 116* 123*   BUN 13 10 9   CREATININE 1.62* 1.49* 1.40*   MG 2.0 1.6 1.3*   ANIONGAP 13 12 10   LABGLOM 31* 34* 37*   GFRAA 38* 41* 45*   CALCIUM 7.6* 7.9* 7.7*     No results for input(s): PROT, LABALBU, LABA1C, U0RDTRL, O6KMWJH, FT4, TSH, AST, ALT, LDH, GGT, ALKPHOS, LABGGT, BILITOT, BILIDIR, AMMONIA, AMYLASE, LIPASE, LACTATE, CHOL, HDL, LDLCHOLESTEROL, CHOLHDLRATIO, TRIG, VLDL, XVY18KU, PHENYTOIN, PHENYF, URICACID, POCGLU in the last 72 hours. ABG:No results found for: POCPH, PHART, PH, POCPCO2, IWW7SBR, PCO2, POCPO2, PO2ART, PO2, POCHCO3, KBR8SCG, HCO3, NBEA, PBEA, BEART, BE, THGBART, THB, EBZ6PVI, CMMT9YMH, C1WLZXSW, O2SAT, FIO2  Lab Results   Component Value Date/Time    SPECIAL NOT REPORTED 07/27/2021 09:36 AM     Lab Results   Component Value Date/Time    CULTURE NO SIGNIFICANT GROWTH 07/27/2021 09:36 AM       Radiology:  CT ABDOMEN PELVIS WO CONTRAST Additional Contrast? None    Result Date: 3/18/2022  1. No acute findings in the abdomen or pelvis to account for the patient's pain. 2. Colonic diverticulosis. 3. The previously noted urethral diverticulum is less conspicuous. 4. Indeterminate proteinaceous cysts in the left kidney with the largest in the lower pole measuring 1.3 cm. Dedicated renal CT or MRI could be performed for further evaluation. CT HEAD WO CONTRAST    Result Date: 3/19/2022  No acute intracranial abnormality. Redemonstration of metallic coils in the left cavernous area. Redemonstration of left internal carotid artery stent in this area. Redemonstration of atrophy and small-vessel disease ischemic changes. MRA HEAD WO CONTRAST    Result Date: 3/19/2022  1. No acute intracranial abnormality. No acute infarct. 2. 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 3. Mild-to-moderate global parenchymal volume loss with moderate chronic microvascular ischemic changes. 4. Susceptibility in the region of the left internal carotid artery due to the stent assisted coiling limits evaluation of the left internal carotid artery. 5. The distal branches of the left PCA are not well visualized, which may be partially due to motion. 6. Otherwise, no significant stenosis is seen of the nxpfpu-lm-Iihrql. 7. No convincing intracranial aneurysm. MRI BRAIN WO CONTRAST    Result Date: 3/19/2022  1. No acute intracranial abnormality. No acute infarct. 2. 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 3. Mild-to-moderate global parenchymal volume loss with moderate chronic microvascular ischemic changes. 4. Susceptibility in the region of the left internal carotid artery due to the stent assisted coiling limits evaluation of the left internal carotid artery. 5. The distal branches of the left PCA are not well visualized, which may be partially due to motion. 6. Otherwise, no significant stenosis is seen of the etgmjt-zn-Fgynqa. 7. No convincing intracranial aneurysm. Physical Examination:        General appearance:  alert, cooperative and no distress  Mental Status:  oriented to person, place and time and normal affect  Lungs:  clear to auscultation bilaterally, normal effort  Heart:  regular rate and rhythm, no murmur  Abdomen:  soft, nontender, nondistended, normal bowel sounds,   Extremities:   Lower extremity erythema and edema present    Assessment:        Hospital Problems           Last Modified POA    * (Principal) Hypomagnesemia 3/19/2022 Yes          Plan:        1.  diarrhea-  resolved  2.  lower extremity cellulitis-  Improving.    Will change clinda to p.o.  3.  AFib-  Metoprolol. Warfarin. Pharmacy to dose warfarin  4.  hypertension-  Lisinopril 20 mg, HCTZ 12.5 mg, metoprolol  5.  hyperlipidemia-  Pravastatin 40 mg. Also on aspirin  6. GERD-  Pantoprazole  7.  hypomagnesemia-  Improved.   Will discharge on oral magnesium  8. DC  today    Danielle Jaramillo MD  3/23/2022  11:53 AM

## 2022-03-24 NOTE — DISCHARGE SUMMARY
[facility]    Discharge Summary     Patient ID: Cecil Curiel  :  1945   MRN: [id number]     ACCOUNT:  [de-identified]   Patient's PCP: Edmonia Galeazzi  Admit Date: 3/19/2022   Discharge Date: 3/23/2022    Length of Stay: 4  Code Status:  Full Code  Admitting Physician: Yamilka Vale MD  Discharge Physician: Yamilka Vale MD     Active Discharge Diagnoses:     Hospital Problem Lists:  Principal Problem:    Hypomagnesemia  Resolved Problems:    * No resolved hospital problems. *      Admission Condition:  poor     Discharged Condition: good    Hospital Stay:     Hospital Course:  Cecil Curiel is a 78 y.o. female who was admitted for the management of   Hypomagnesemia , presented to ER withHeadache and Dizziness  Patient had diarrhea and nausea, found to have some magnesium me, magnesium was placed. She was also to lower extremity cellulitis, is allergic to multiple antibiotics, was started on  IV clindamycin. GI stool pathogen panel was negative, her diarrhea improved significantly. has history AFib, on metoprolol and warfarin which was continued. patient was discharged on oral clindamycin and was started on magnesium. Patient was discharged home in stable condition. Significant Diagnostic Studies:   Radiology:  CT ABDOMEN PELVIS WO CONTRAST Additional Contrast? None    Result Date: 3/18/2022  1. No acute findings in the abdomen or pelvis to account for the patient's pain. 2. Colonic diverticulosis. 3. The previously noted urethral diverticulum is less conspicuous. 4. Indeterminate proteinaceous cysts in the left kidney with the largest in the lower pole measuring 1.3 cm. Dedicated renal CT or MRI could be performed for further evaluation. CT HEAD WO CONTRAST    Result Date: 3/19/2022  No acute intracranial abnormality. Redemonstration of metallic coils in the left cavernous area.   Redemonstration of left internal carotid artery stent in this area. Redemonstration of atrophy and small-vessel disease ischemic changes. MRA HEAD WO CONTRAST    Result Date: 3/19/2022  1. No acute intracranial abnormality. No acute infarct. 2. 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 3. Mild-to-moderate global parenchymal volume loss with moderate chronic microvascular ischemic changes. 4. Susceptibility in the region of the left internal carotid artery due to the stent assisted coiling limits evaluation of the left internal carotid artery. 5. The distal branches of the left PCA are not well visualized, which may be partially due to motion. 6. Otherwise, no significant stenosis is seen of the ykjyez-ea-Xlaezu. 7. No convincing intracranial aneurysm. MRI BRAIN WO CONTRAST    Result Date: 3/19/2022  1. No acute intracranial abnormality. No acute infarct. 2. 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 3. Mild-to-moderate global parenchymal volume loss with moderate chronic microvascular ischemic changes. 4. Susceptibility in the region of the left internal carotid artery due to the stent assisted coiling limits evaluation of the left internal carotid artery. 5. The distal branches of the left PCA are not well visualized, which may be partially due to motion. 6. Otherwise, no significant stenosis is seen of the iepdpz-xk-Cgvuku. 7. No convincing intracranial aneurysm. Consultations:    Consults:     Final Specialist Recommendations/Findings:   IP CONSULT TO FAMILY MEDICINE  IP CONSULT TO SOCIAL WORK  PHARMACY TO DOSE WARFARIN      The patient was seen and examined on day of discharge and this discharge summary is in conjunction with any daily progress note from day of discharge. Discharge plan:     Disposition: Home    Physician Follow Up:      [address] [id number]    Call in 1 day  to inquire about coumadin dose, INR has been 3.8, 3.8, and 3.4 last 3 days. Hospital follow up appointment in 1 week         Activity: As tolerated    Discharge Medications:      Medication List      START taking these medications    clindamycin 300 MG capsule  Commonly known as: CLEOCIN  Take 1 capsule by mouth 3 times daily for 3 days     Magnesium Oxide 200 MG Tabs  Take 200 mg by mouth daily        CONTINUE taking these medications    acetaminophen 500 MG tablet  Commonly known as: TYLENOL     allopurinol 300 MG tablet  Commonly known as: ZYLOPRIM     aspirin 325 MG tablet     lisinopril-hydroCHLOROthiazide 20-12.5 MG per tablet  Commonly known as: PRINZIDE;ZESTORETIC     metoprolol tartrate 50 MG tablet  Commonly known as: LOPRESSOR     omeprazole 20 MG delayed release capsule  Commonly known as: PRILOSEC     ondansetron 4 MG disintegrating tablet  Commonly known as: Zofran ODT  Take 1 tablet by mouth every 8 hours as needed for Nausea     oxybutynin 10 MG extended release tablet  Commonly known as: DITROPAN-XL  TAKE ONE TABLET BY MOUTH DAILY     pravastatin 40 MG tablet  Commonly known as: PRAVACHOL     predniSONE 10 MG tablet  Commonly known as: DELTASONE     warfarin 3 MG tablet  Commonly known as: COUMADIN        STOP taking these medications    doxycycline monohydrate 100 MG tablet  Commonly known as: ADOXA           Where to Get Your Medications      These medications were sent to [place] [address]    Phone: [phone]   clindamycin 300 MG capsule  Magnesium Oxide 200 MG Tabs         No discharge procedures on file. Time Spent on discharge is  35 mins in patient examination, evaluation, counseling as well as medication reconciliation, prescriptions for required medications, discharge plan and follow up.     Electronically signed by   Saundra Wang MD  3/23/2022  8:06 PM      Thank you Dr. Gary Martini for the opportunity to be involved in this patient's care.

## 2022-04-01 ENCOUNTER — TELEPHONE (OUTPATIENT)
Dept: GASTROENTEROLOGY | Age: 77
End: 2022-04-01

## 2022-04-01 DIAGNOSIS — R11.0 NAUSEA: Primary | ICD-10-CM

## 2022-04-01 DIAGNOSIS — K21.9 GASTROESOPHAGEAL REFLUX DISEASE, UNSPECIFIED WHETHER ESOPHAGITIS PRESENT: ICD-10-CM

## 2022-04-01 NOTE — TELEPHONE ENCOUNTER
Spoke w/ Char at [facility]. Pt is scheduled w/  on tues 4/5/22. They will do COVID test prior to her discharge tonight at [facility]. Pt being released to a [facility]. PAT is not needed as this day is scheduled w/ LILIYA Pardo informed discharge nurse of proc and time and prep.

## 2022-04-01 NOTE — TELEPHONE ENCOUNTER
Dr Dominique Allison called office. He states pt is [place] [facility]. He wants OP EGD scheduled for Tues at 8:15. Pt is being d/c to a nursing home and procedure needs to be scheduled prior to d/c. Writer advised procedure . She will take care of scheduling.

## 2022-04-05 NOTE — TELEPHONE ENCOUNTER
Neel from [facility] called stating they needed to cancel and reschedule pt's EGD. Neel states egd instructions and date/time we're not communicated to the facility therefore transport was never set up. Pt now rescheduled for [facility] EGD Tues 5/31/22 @ 845am-cm- egd instructions faxed to [phone]- writer sent coumadin clearance sent to [facility] coumadin clinic- pt has appt on 4/8/22 @ 815am per nurse- SNF will do covid test there and updated H&P-Neel was notified [employer] will need written proof of negative covid results and updated H&P and he voiced understanding. He noted pt may be discharged home by then since pt normally only stay in rehab 30/45 days. If pt is discharged home we will need to schedule PAT and covid w/[place]. [facility] number [phone]. Per Jordna Black best time to call is between 9-11 and 2-4 when staff is at the desk charting.      Will need to fax clearance to Vincent when rec'd from [facility] coumadin [facility]

## 2022-04-25 NOTE — TELEPHONE ENCOUNTER
Melvina Sapp from [facility] coumadin [facility] called and lvm requesting call back and questioning how long the pt needs to be off the coumadin for the EGD. Writer called and spoke to Melvina Sapp and informed her that it is per their recommendation but it's typically 5 days. She voiced understanding and states she will also send a clearance to Dr Connie Jeong office.  [facility]

## 2022-05-02 ENCOUNTER — HOSPITAL ENCOUNTER (OUTPATIENT)
Dept: PREADMISSION TESTING | Age: 77
Discharge: HOME | End: 2022-05-06

## 2022-05-02 VITALS — WEIGHT: 270 LBS | BODY MASS INDEX: 40.92 KG/M2 | HEIGHT: 68 IN

## 2022-05-02 NOTE — PROGRESS NOTES
Pre-op Instructions For Out-Patient Surgery    Medication Instructions:  · Please stop herbs and any supplements now (includes vitamins and minerals). · Please contact your surgeon and prescribing physician for pre-op instructions for any blood thinners. Warfarin    · If you have inhalers/aerosol treatments at home, please use them the morning of your surgery and bring the inhalers with you to the hospital.    · Please take the following medications the morning of your surgery with a sip of water:    Metoprolol     Surgery Instructions:  1. After midnight before surgery:  Do not eat or drink anything, including water, mints, gum, and hard candy. You may brush your teeth without swallowing. No smoking, chewing tobacco, or street drugs. 2. Please shower or bathe before surgery. 3. Please do not wear any cologne, lotion, powder, deodorant, jewelry, piercings, perfume, makeup, nail polish, hair accessories, or hair spray on the day of surgery. Wear loose comfortable clothing. 4. Leave your valuables at home. Bring a storage case for any glasses/contacts. 5. An adult who is responsible for you MUST drive you home and should be with you for the first 24 hours after surgery. 6. If having out-patient knee and foot surgeries, please arrange for planned crutches, walker, or wheelchair before arriving to the hospital.    The Day of Surgery:  · Arrive at [address] [place] [address] Surgery Entrance at the time directed by your surgeon and check in at the desk. · If you have a living will or healthcare power of , please bring a copy. · You will be taken to the pre-op holding area where you will be prepared for surgery. A physical assessment will be performed by a nurse practitioner or house officer.   Your IV will be started and you will meet your anesthesiologist.    · When you go to surgery, your family will be directed to the surgical waiting room, where the doctor should speak with them after your surgery. · After surgery, you will be taken to the recovery room then when you are awake and stable you will go to the short stay unit for preparation to be discharged. · If you use a Bi-PAP or C-PAP machine, please bring it with you and leave it in the car in case it is needed in recovery room. Instructions read to Lisa Carroll and understanding verbalized.      5/6/22 RAVID

## 2022-05-13 ENCOUNTER — OFFICE VISIT (OUTPATIENT)
Dept: PODIATRY | Age: 77
End: 2022-05-13
Payer: MEDICARE

## 2022-05-13 VITALS — BODY MASS INDEX: 40.92 KG/M2 | WEIGHT: 270 LBS | HEIGHT: 68 IN

## 2022-05-13 DIAGNOSIS — M79.675 PAIN OF TOES OF BOTH FEET: ICD-10-CM

## 2022-05-13 DIAGNOSIS — M79.674 PAIN OF TOES OF BOTH FEET: ICD-10-CM

## 2022-05-13 DIAGNOSIS — B35.1 ONYCHOMYCOSIS OF TOENAIL: Primary | ICD-10-CM

## 2022-05-13 PROCEDURE — 3017F COLORECTAL CA SCREEN DOC REV: CPT | Performed by: PODIATRIST

## 2022-05-13 PROCEDURE — G8399 PT W/DXA RESULTS DOCUMENT: HCPCS | Performed by: PODIATRIST

## 2022-05-13 PROCEDURE — 1090F PRES/ABSN URINE INCON ASSESS: CPT | Performed by: PODIATRIST

## 2022-05-13 PROCEDURE — G8417 CALC BMI ABV UP PARAM F/U: HCPCS | Performed by: PODIATRIST

## 2022-05-13 PROCEDURE — 11721 DEBRIDE NAIL 6 OR MORE: CPT | Performed by: PODIATRIST

## 2022-05-13 PROCEDURE — 99203 OFFICE O/P NEW LOW 30 MIN: CPT | Performed by: PODIATRIST

## 2022-05-13 PROCEDURE — 4040F PNEUMOC VAC/ADMIN/RCVD: CPT | Performed by: PODIATRIST

## 2022-05-13 PROCEDURE — 1123F ACP DISCUSS/DSCN MKR DOCD: CPT | Performed by: PODIATRIST

## 2022-05-13 PROCEDURE — 1036F TOBACCO NON-USER: CPT | Performed by: PODIATRIST

## 2022-05-13 PROCEDURE — G8427 DOCREV CUR MEDS BY ELIG CLIN: HCPCS | Performed by: PODIATRIST

## 2022-05-13 RX ORDER — FUROSEMIDE 20 MG/1
20 TABLET ORAL DAILY
COMMUNITY
Start: 2022-04-01

## 2022-05-17 ASSESSMENT — ENCOUNTER SYMPTOMS
BACK PAIN: 0
COLOR CHANGE: 0
SHORTNESS OF BREATH: 0
DIARRHEA: 0
NAUSEA: 0

## 2022-05-17 NOTE — PROGRESS NOTES
SUBJECTIVE: Jennifer Perry is a 78 y.o. female who returns to the office with chief complaint of painful fungal toenails. Patient relates toe nails are thickened/difficult to trim as well as painful with ambulation and with shoe gear. Chief Complaint   Patient presents with    Nail Problem     b/l nail trim, last seen Bri Fuentes 1/31/22     Review of Systems   Constitutional: Negative for activity change, appetite change, chills, diaphoresis, fatigue and fever. Respiratory: Negative for shortness of breath. Cardiovascular: Negative for leg swelling. Gastrointestinal: Negative for diarrhea and nausea. Endocrine: Negative for cold intolerance, heat intolerance and polyuria. Musculoskeletal: Positive for arthralgias. Negative for back pain, gait problem, joint swelling and myalgias. Skin: Negative for color change, pallor, rash and wound. Allergic/Immunologic: Negative for environmental allergies and food allergies. Neurological: Negative for dizziness, weakness, light-headedness and numbness. Hematological: Does not bruise/bleed easily. Psychiatric/Behavioral: Negative for behavioral problems, confusion and self-injury. The patient is not nervous/anxious. OBJECTIVE: Clinical evaluation of patient reveals nails 1,2,3,4,5 of the right foot and nails 1,2,3,4,5 of the left foot to present with thickness, elongation, discoloration, brittleness, and subungual debris. There was pain with palpation and debridement of the toenails of the bilateral feet. No open lesions noted to either foot today. The right DP pulse is palpable. The left DP pulse is palpable. The right PT pulse is palpable. The left PT pulse is palpable. Class A Findings (1 needed)   [] Non-traumatic amputation of foot or integral skeleton portion thereof. [] Q7.      Class B Findings (2 needed)   1. [] Absent posterior tibial pulse   2. [] Absent dorsalis pedis pulse   3.  [] Advanced trophic changes; three of the following are required:   ·         [] hair growth (decrease or absence)   ·         [] nail changes (thickening)   ·         [] pigmentary changes (discoloration)   ·         [] skin texture (thin, shiny)   ·         [] skin color (rubor or redness)   [] Q8.      Class C Findings (1 Class B, 2 Class C needed)   1. [] Claudication   2. [] Temperature changes   3. [] Edema   4. [] Paresthesia   5. [] Burning   [] Q9.     NO CLASS FINDINGS ARE NOTED    ASSESSMENT:    Diagnosis Orders   1. Onychomycosis of toenail  MT DEBRIDEMENT OF NAILS, 6 OR MORE   2. Pain of toes of both feet  MT DEBRIDEMENT OF NAILS, 6 OR MORE     PLAN: Toenails 1,2,3,4,5 of the right foot and 1,2,3,4,5 of the left foot were debrided in length and thickness using a nail nipper and a . Return in about 9 weeks (around 7/15/2022) for Painful fungal nails.    5/13/2022      Philippe Moreau DPM

## 2022-05-19 NOTE — TELEPHONE ENCOUNTER
Writer called Dr Young Guidry office to f/u on coumadin clearance. Per Dr Young Guidry staff pt cancelled her appt because her procedure was cancelled. Writer called and spoke to pt to inform her that she had an EGD scheduled with Dr Elan Sherwood that was cancelled and one with Dr Sabrina Bob that is still scheduled. Pt stating she cancelled the one with Dr Elan Sherwood because she is now eating fine and no longer having issues. Pt notes she wants to cancel egd with Dr Sabrina Bob as well. Procedure is now cancelled and writer informed pt to call our office if she needs to reschedule at a later date.

## 2022-06-10 ENCOUNTER — HOSPITAL ENCOUNTER (OUTPATIENT)
Age: 77
Discharge: HOME | End: 2022-06-10
Payer: MEDICARE

## 2022-06-10 LAB
ANION GAP SERPL CALCULATED.3IONS-SCNC: 13 MMOL/L (ref 9–17)
BUN BLDV-MCNC: 30 MG/DL (ref 8–23)
CALCIUM SERPL-MCNC: 8.5 MG/DL (ref 8.6–10.4)
CHLORIDE BLD-SCNC: 105 MMOL/L (ref 98–107)
CO2: 25 MMOL/L (ref 20–31)
CREAT SERPL-MCNC: 1.13 MG/DL (ref 0.5–0.9)
GFR AFRICAN AMERICAN: 57 ML/MIN
GFR NON-AFRICAN AMERICAN: 47 ML/MIN
GFR SERPL CREATININE-BSD FRML MDRD: ABNORMAL ML/MIN/{1.73_M2}
GLUCOSE BLD-MCNC: 89 MG/DL (ref 70–99)
MAGNESIUM, IONIZED: 0.49 MMOL/L (ref 0.45–0.6)
POTASSIUM SERPL-SCNC: 4 MMOL/L (ref 3.7–5.3)
SEDIMENTATION RATE, ERYTHROCYTE: 17 MM/HR (ref 0–30)
SODIUM BLD-SCNC: 143 MMOL/L (ref 135–144)

## 2022-06-10 PROCEDURE — 80048 BASIC METABOLIC PNL TOTAL CA: CPT

## 2022-06-10 PROCEDURE — 36415 COLL VENOUS BLD VENIPUNCTURE: CPT

## 2022-06-10 PROCEDURE — 85652 RBC SED RATE AUTOMATED: CPT

## 2022-06-10 PROCEDURE — 83735 ASSAY OF MAGNESIUM: CPT

## 2022-07-15 ENCOUNTER — OFFICE VISIT (OUTPATIENT)
Dept: PODIATRY | Age: 77
End: 2022-07-15
Payer: MEDICARE

## 2022-07-15 VITALS — WEIGHT: 270 LBS | HEIGHT: 68 IN | BODY MASS INDEX: 40.92 KG/M2

## 2022-07-15 DIAGNOSIS — M79.675 PAIN OF TOES OF BOTH FEET: ICD-10-CM

## 2022-07-15 DIAGNOSIS — M79.674 PAIN OF TOES OF BOTH FEET: ICD-10-CM

## 2022-07-15 DIAGNOSIS — B35.1 ONYCHOMYCOSIS OF TOENAIL: Primary | ICD-10-CM

## 2022-07-15 PROCEDURE — 99999 PR OFFICE/OUTPT VISIT,PROCEDURE ONLY: CPT | Performed by: PODIATRIST

## 2022-07-15 PROCEDURE — 11721 DEBRIDE NAIL 6 OR MORE: CPT | Performed by: PODIATRIST

## 2022-07-19 ASSESSMENT — ENCOUNTER SYMPTOMS
NAUSEA: 0
BACK PAIN: 0
SHORTNESS OF BREATH: 0
DIARRHEA: 0
COLOR CHANGE: 0

## 2022-07-19 NOTE — PROGRESS NOTES
SUBJECTIVE: Tita Roland is a 78 y.o. female who returns to the office with chief complaint of painful fungal toenails. Patient relates toe nails are thickened/difficult to trim as well as painful with ambulation and with shoe gear. Chief Complaint   Patient presents with    Nail Problem     B/l nail trim/ last seen Dr. Debora Villavicencio 5/28/2022     Review of Systems   Constitutional:  Negative for activity change, appetite change, chills, diaphoresis, fatigue and fever. Respiratory:  Negative for shortness of breath. Cardiovascular:  Negative for leg swelling. Gastrointestinal:  Negative for diarrhea and nausea. Endocrine: Negative for cold intolerance, heat intolerance and polyuria. Musculoskeletal:  Positive for arthralgias. Negative for back pain, gait problem, joint swelling and myalgias. Skin:  Negative for color change, pallor, rash and wound. Allergic/Immunologic: Negative for environmental allergies and food allergies. Neurological:  Negative for dizziness, weakness, light-headedness and numbness. Hematological:  Does not bruise/bleed easily. Psychiatric/Behavioral:  Negative for behavioral problems, confusion and self-injury. The patient is not nervous/anxious. OBJECTIVE: Clinical evaluation of patient reveals nails 1,2,3,4,5 of the right foot and nails 1,2,3,4,5 of the left foot to present with thickness, elongation, discoloration, brittleness, and subungual debris. There was pain with palpation and debridement of the toenails of the bilateral feet. No open lesions noted to either foot today. The right DP pulse is palpable. The left DP pulse is palpable. The right PT pulse is palpable. The left PT pulse is palpable. Class A Findings (1 needed)   [] Non-traumatic amputation of foot or integral skeleton portion thereof. [] Q7.      Class B Findings (2 needed)   1. [] Absent posterior tibial pulse   2. [] Absent dorsalis pedis pulse   3.  [] Advanced trophic changes; three of the following are required:   ·         [] hair growth (decrease or absence)   ·         [] nail changes (thickening)   ·         [] pigmentary changes (discoloration)   ·         [] skin texture (thin, shiny)   ·         [] skin color (rubor or redness)   [] Q8.      Class C Findings (1 Class B, 2 Class C needed)   1. [] Claudication   2. [] Temperature changes   3. [] Edema   4. [] Paresthesia   5. [] Burning   [] Q9.     NO CLASS FINDINGS ARE NOTED    ASSESSMENT:    Diagnosis Orders   1. Onychomycosis of toenail  AK DEBRIDEMENT OF NAILS, 6 OR MORE      2. Pain of toes of both feet  AK DEBRIDEMENT OF NAILS, 6 OR MORE        PLAN: Toenails 1,2,3,4,5 of the right foot and 1,2,3,4,5 of the left foot were debrided in length and thickness using a nail nipper and a . Return in about 9 weeks (around 9/16/2022) for Painful fungal nails.    7/15/2022      Nhi Shane DPM

## 2022-11-04 ENCOUNTER — HOSPITAL ENCOUNTER (OUTPATIENT)
Age: 77
Setting detail: SPECIMEN
Discharge: HOME | End: 2022-11-04

## 2022-11-06 LAB
CULTURE: NORMAL
SPECIMEN DESCRIPTION: NORMAL

## 2024-07-18 ENCOUNTER — HOSPITAL ENCOUNTER (OUTPATIENT)
Age: 79
Setting detail: SPECIMEN
Discharge: HOME | End: 2024-07-18

## 2024-07-18 LAB
ALBUMIN SERPL-MCNC: 3 G/DL (ref 3.5–5.2)
ALP SERPL-CCNC: 85 U/L (ref 35–104)
ALT SERPL-CCNC: 8 U/L (ref 5–33)
ANION GAP SERPL CALCULATED.3IONS-SCNC: 15 MMOL/L (ref 9–17)
AST SERPL-CCNC: 16 U/L
BILIRUB SERPL-MCNC: 1.3 MG/DL (ref 0.3–1.2)
BUN SERPL-MCNC: 10 MG/DL (ref 8–23)
BUN/CREAT SERPL: 8 (ref 9–20)
CALCIUM SERPL-MCNC: 8.2 MG/DL (ref 8.6–10.4)
CHLORIDE SERPL-SCNC: 96 MMOL/L (ref 98–107)
CO2 SERPL-SCNC: 25 MMOL/L (ref 20–31)
CREAT SERPL-MCNC: 1.3 MG/DL (ref 0.5–0.9)
DIGOXIN SERPL-MCNC: 0.6 NG/ML (ref 0.5–2)
ERYTHROCYTE [DISTWIDTH] IN BLOOD BY AUTOMATED COUNT: 20.3 % (ref 11.8–14.4)
GFR, ESTIMATED: 43 ML/MIN/1.73M2
GLUCOSE SERPL-MCNC: 96 MG/DL (ref 70–99)
HCT VFR BLD AUTO: 32.8 % (ref 36.3–47.1)
HGB BLD-MCNC: 9.4 G/DL (ref 11.9–15.1)
MCH RBC QN AUTO: 26.2 PG (ref 25.2–33.5)
MCHC RBC AUTO-ENTMCNC: 28.7 G/DL (ref 28.4–34.8)
MCV RBC AUTO: 91.4 FL (ref 82.6–102.9)
NRBC BLD-RTO: 0 PER 100 WBC
PLATELET # BLD AUTO: 171 K/UL (ref 138–453)
PMV BLD AUTO: 10.7 FL (ref 8.1–13.5)
POTASSIUM SERPL-SCNC: 4 MMOL/L (ref 3.7–5.3)
PROT SERPL-MCNC: 5.5 G/DL (ref 6.4–8.3)
RBC # BLD AUTO: 3.59 M/UL (ref 3.95–5.11)
SODIUM SERPL-SCNC: 136 MMOL/L (ref 135–144)
WBC OTHER # BLD: 8.2 K/UL (ref 3.5–11.3)

## 2024-07-18 PROCEDURE — 80053 COMPREHEN METABOLIC PANEL: CPT

## 2024-07-18 PROCEDURE — 87506 IADNA-DNA/RNA PROBE TQ 6-11: CPT

## 2024-07-18 PROCEDURE — P9603 ONE-WAY ALLOW PRORATED MILES: HCPCS

## 2024-07-18 PROCEDURE — 36415 COLL VENOUS BLD VENIPUNCTURE: CPT

## 2024-07-18 PROCEDURE — 85027 COMPLETE CBC AUTOMATED: CPT

## 2024-07-18 PROCEDURE — 80162 ASSAY OF DIGOXIN TOTAL: CPT

## 2024-07-19 LAB
CAMPYLOBACTER DNA SPEC NAA+PROBE: NORMAL
ETEC ELTA+ESTB GENES STL QL NAA+PROBE: NORMAL
P SHIGELLOIDES DNA STL QL NAA+PROBE: NORMAL
SALMONELLA DNA SPEC QL NAA+PROBE: NORMAL
SHIGA TOXIN STX GENE SPEC NAA+PROBE: NORMAL
SHIGELLA DNA SPEC QL NAA+PROBE: NORMAL
SPECIMEN DESCRIPTION: NORMAL
V CHOL+PARA RFBL+TRKH+TNAA STL QL NAA+PR: NORMAL
Y ENTERO RECN STL QL NAA+PROBE: NORMAL

## 2024-07-20 ENCOUNTER — HOSPITAL ENCOUNTER (OUTPATIENT)
Age: 79
Setting detail: SPECIMEN
Discharge: HOME | End: 2024-07-20

## 2024-07-20 PROCEDURE — 81001 URINALYSIS AUTO W/SCOPE: CPT

## 2024-07-20 PROCEDURE — 87086 URINE CULTURE/COLONY COUNT: CPT

## 2024-07-21 LAB
BACTERIA URNS QL MICRO: ABNORMAL
BILIRUB UR QL STRIP: ABNORMAL
CLARITY UR: ABNORMAL
COLOR UR: ABNORMAL
EPI CELLS #/AREA URNS HPF: ABNORMAL /HPF (ref 0–5)
GLUCOSE UR STRIP-MCNC: ABNORMAL MG/DL
HGB UR QL STRIP.AUTO: ABNORMAL
KETONES UR STRIP-MCNC: ABNORMAL MG/DL
LEUKOCYTE ESTERASE UR QL STRIP: ABNORMAL
NITRITE UR QL STRIP: POSITIVE
PH UR STRIP: 5 [PH] (ref 5–8)
PROT UR STRIP-MCNC: ABNORMAL MG/DL
RBC #/AREA URNS HPF: ABNORMAL /HPF (ref 0–2)
SP GR UR STRIP: 1.03 (ref 1–1.03)
UROBILINOGEN UR STRIP-ACNC: NORMAL EU/DL (ref 0–1)
WBC #/AREA URNS HPF: ABNORMAL /HPF (ref 0–5)
YEAST URNS QL MICRO: ABNORMAL

## 2024-07-22 ENCOUNTER — HOSPITAL ENCOUNTER (OUTPATIENT)
Age: 79
Setting detail: SPECIMEN
Discharge: HOME | End: 2024-07-22

## 2024-07-22 LAB
ALBUMIN SERPL-MCNC: 2.7 G/DL (ref 3.5–5.2)
ALP SERPL-CCNC: 87 U/L (ref 35–104)
ALT SERPL-CCNC: 5 U/L (ref 5–33)
ANION GAP SERPL CALCULATED.3IONS-SCNC: 11 MMOL/L (ref 9–17)
AST SERPL-CCNC: 12 U/L
BILIRUB SERPL-MCNC: 0.5 MG/DL (ref 0.3–1.2)
BUN SERPL-MCNC: 22 MG/DL (ref 8–23)
BUN/CREAT SERPL: 15 (ref 9–20)
CALCIUM SERPL-MCNC: 8.3 MG/DL (ref 8.6–10.4)
CHLORIDE SERPL-SCNC: 99 MMOL/L (ref 98–107)
CO2 SERPL-SCNC: 26 MMOL/L (ref 20–31)
CREAT SERPL-MCNC: 1.5 MG/DL (ref 0.5–0.9)
DIGOXIN SERPL-MCNC: 0.8 NG/ML (ref 0.5–2)
ERYTHROCYTE [DISTWIDTH] IN BLOOD BY AUTOMATED COUNT: 20 % (ref 11.8–14.4)
GFR, ESTIMATED: 36 ML/MIN/1.73M2
GLUCOSE SERPL-MCNC: 65 MG/DL (ref 70–99)
HCT VFR BLD AUTO: 32.4 % (ref 36.3–47.1)
HGB BLD-MCNC: 9.2 G/DL (ref 11.9–15.1)
MCH RBC QN AUTO: 26.9 PG (ref 25.2–33.5)
MCHC RBC AUTO-ENTMCNC: 28.4 G/DL (ref 28.4–34.8)
MCV RBC AUTO: 94.7 FL (ref 82.6–102.9)
MICROORGANISM SPEC CULT: ABNORMAL
NRBC BLD-RTO: 0 PER 100 WBC
PLATELET # BLD AUTO: 179 K/UL (ref 138–453)
PMV BLD AUTO: 11.4 FL (ref 8.1–13.5)
POTASSIUM SERPL-SCNC: 3.9 MMOL/L (ref 3.7–5.3)
PROT SERPL-MCNC: 5.2 G/DL (ref 6.4–8.3)
RBC # BLD AUTO: 3.42 M/UL (ref 3.95–5.11)
SODIUM SERPL-SCNC: 136 MMOL/L (ref 135–144)
SPECIMEN DESCRIPTION: ABNORMAL
WBC OTHER # BLD: 4.2 K/UL (ref 3.5–11.3)

## 2024-07-22 PROCEDURE — 80162 ASSAY OF DIGOXIN TOTAL: CPT

## 2024-07-22 PROCEDURE — P9603 ONE-WAY ALLOW PRORATED MILES: HCPCS

## 2024-07-22 PROCEDURE — 80053 COMPREHEN METABOLIC PANEL: CPT

## 2024-07-22 PROCEDURE — 87493 C DIFF AMPLIFIED PROBE: CPT

## 2024-07-22 PROCEDURE — 36415 COLL VENOUS BLD VENIPUNCTURE: CPT

## 2024-07-22 PROCEDURE — 85027 COMPLETE CBC AUTOMATED: CPT

## 2024-07-24 LAB
C DIFFICILE TOXINS, PCR: NORMAL
SPECIMEN DESCRIPTION: NORMAL

## 2024-07-27 ENCOUNTER — HOSPITAL ENCOUNTER (INPATIENT)
Age: 79
LOS: 2 days | Discharge: NURSING, REHAB OR LONG-TERM CARE | DRG: 757 | End: 2024-07-30
Attending: EMERGENCY MEDICINE | Admitting: STUDENT IN AN ORGANIZED HEALTH CARE EDUCATION/TRAINING PROGRAM
Payer: MEDICARE

## 2024-07-27 ENCOUNTER — APPOINTMENT (OUTPATIENT)
Dept: CT IMAGING | Age: 79
DRG: 757 | End: 2024-07-27
Payer: MEDICARE

## 2024-07-27 DIAGNOSIS — R07.9 CHEST PAIN, UNSPECIFIED TYPE: ICD-10-CM

## 2024-07-27 DIAGNOSIS — R06.02 SHORTNESS OF BREATH: ICD-10-CM

## 2024-07-27 DIAGNOSIS — R11.2 NAUSEA AND VOMITING, UNSPECIFIED VOMITING TYPE: ICD-10-CM

## 2024-07-27 DIAGNOSIS — I21.4 NSTEMI (NON-ST ELEVATED MYOCARDIAL INFARCTION) (HCC): Primary | ICD-10-CM

## 2024-07-27 DIAGNOSIS — J41.0 SIMPLE CHRONIC BRONCHITIS (HCC): ICD-10-CM

## 2024-07-27 LAB
ALBUMIN SERPL-MCNC: 2.8 G/DL (ref 3.5–5.2)
ALBUMIN/GLOB SERPL: 1 {RATIO} (ref 1–2.5)
ALP SERPL-CCNC: 96 U/L (ref 35–104)
ALT SERPL-CCNC: 5 U/L (ref 5–33)
ANION GAP SERPL CALCULATED.3IONS-SCNC: 15 MMOL/L (ref 9–17)
AST SERPL-CCNC: 17 U/L
BACTERIA URNS QL MICRO: ABNORMAL
BASOPHILS # BLD: 0.06 K/UL (ref 0–0.2)
BASOPHILS NFR BLD: 1 % (ref 0–2)
BILIRUB SERPL-MCNC: 0.7 MG/DL (ref 0.3–1.2)
BILIRUB UR QL STRIP: ABNORMAL
BNP SERPL-MCNC: 2077 PG/ML
BUN SERPL-MCNC: 11 MG/DL (ref 8–23)
CALCIUM SERPL-MCNC: 8.3 MG/DL (ref 8.6–10.4)
CHARACTER UR: ABNORMAL
CHLORIDE SERPL-SCNC: 95 MMOL/L (ref 98–107)
CLARITY UR: ABNORMAL
CO2 SERPL-SCNC: 21 MMOL/L (ref 20–31)
COLOR UR: YELLOW
CREAT SERPL-MCNC: 1.1 MG/DL (ref 0.5–0.9)
EOSINOPHIL # BLD: 0.25 K/UL (ref 0–0.4)
EOSINOPHILS RELATIVE PERCENT: 4 % (ref 1–4)
EPI CELLS #/AREA URNS HPF: ABNORMAL /HPF (ref 0–5)
ERYTHROCYTE [DISTWIDTH] IN BLOOD BY AUTOMATED COUNT: 22.4 % (ref 12.5–15.4)
GFR, ESTIMATED: 52 ML/MIN/1.73M2
GLUCOSE SERPL-MCNC: 81 MG/DL (ref 70–99)
GLUCOSE UR STRIP-MCNC: NEGATIVE MG/DL
HCT VFR BLD AUTO: 30.4 % (ref 36–46)
HGB BLD-MCNC: 9.7 G/DL (ref 12–16)
HGB UR QL STRIP.AUTO: ABNORMAL
INR PPP: 1.4
KETONES UR STRIP-MCNC: ABNORMAL MG/DL
LACTATE BLDV-SCNC: 1.5 MMOL/L (ref 0.5–2.2)
LEUKOCYTE ESTERASE UR QL STRIP: ABNORMAL
LIPASE SERPL-CCNC: 18 U/L (ref 13–60)
LYMPHOCYTES NFR BLD: 2.02 K/UL (ref 1–4.8)
LYMPHOCYTES RELATIVE PERCENT: 32 % (ref 24–44)
MCH RBC QN AUTO: 27.4 PG (ref 26–34)
MCHC RBC AUTO-ENTMCNC: 31.8 G/DL (ref 31–37)
MCV RBC AUTO: 86.2 FL (ref 80–100)
MONOCYTES NFR BLD: 0.63 K/UL (ref 0.1–1.2)
MONOCYTES NFR BLD: 10 % (ref 2–11)
MORPHOLOGY: ABNORMAL
MORPHOLOGY: ABNORMAL
NEUTROPHILS NFR BLD: 53 % (ref 36–66)
NEUTS SEG NFR BLD: 3.34 K/UL (ref 1.8–7.7)
NITRITE UR QL STRIP: NEGATIVE
PH UR STRIP: 5.5 [PH] (ref 5–8)
PLATELET # BLD AUTO: 246 K/UL (ref 140–450)
PMV BLD AUTO: 8.4 FL (ref 6–12)
POTASSIUM SERPL-SCNC: 5 MMOL/L (ref 3.7–5.3)
PROT SERPL-MCNC: 5.5 G/DL (ref 6.4–8.3)
PROT UR STRIP-MCNC: ABNORMAL MG/DL
PROTHROMBIN TIME: 14.4 SEC (ref 9.4–12.6)
RBC # BLD AUTO: 3.53 M/UL (ref 4–5.2)
RBC #/AREA URNS HPF: ABNORMAL /HPF (ref 0–2)
SARS-COV-2 RDRP RESP QL NAA+PROBE: NOT DETECTED
SODIUM SERPL-SCNC: 131 MMOL/L (ref 135–144)
SP GR UR STRIP: 1.02 (ref 1–1.03)
SPECIMEN DESCRIPTION: NORMAL
TROPONIN I SERPL HS-MCNC: 74 NG/L (ref 0–14)
TROPONIN I SERPL HS-MCNC: 75 NG/L (ref 0–14)
UROBILINOGEN UR STRIP-ACNC: NORMAL EU/DL (ref 0–1)
WBC #/AREA URNS HPF: ABNORMAL /HPF (ref 0–5)
WBC OTHER # BLD: 6.3 K/UL (ref 3.5–11)
YEAST URNS QL MICRO: ABNORMAL

## 2024-07-27 PROCEDURE — 85610 PROTHROMBIN TIME: CPT

## 2024-07-27 PROCEDURE — 83880 ASSAY OF NATRIURETIC PEPTIDE: CPT

## 2024-07-27 PROCEDURE — 2580000003 HC RX 258: Performed by: NURSE PRACTITIONER

## 2024-07-27 PROCEDURE — 6360000002 HC RX W HCPCS: Performed by: NURSE PRACTITIONER

## 2024-07-27 PROCEDURE — 87106 FUNGI IDENTIFICATION YEAST: CPT

## 2024-07-27 PROCEDURE — 99285 EMERGENCY DEPT VISIT HI MDM: CPT

## 2024-07-27 PROCEDURE — 85025 COMPLETE CBC W/AUTO DIFF WBC: CPT

## 2024-07-27 PROCEDURE — 87086 URINE CULTURE/COLONY COUNT: CPT

## 2024-07-27 PROCEDURE — 96375 TX/PRO/DX INJ NEW DRUG ADDON: CPT

## 2024-07-27 PROCEDURE — 74176 CT ABD & PELVIS W/O CONTRAST: CPT

## 2024-07-27 PROCEDURE — 83605 ASSAY OF LACTIC ACID: CPT

## 2024-07-27 PROCEDURE — 36415 COLL VENOUS BLD VENIPUNCTURE: CPT

## 2024-07-27 PROCEDURE — 93005 ELECTROCARDIOGRAM TRACING: CPT | Performed by: NURSE PRACTITIONER

## 2024-07-27 PROCEDURE — 84484 ASSAY OF TROPONIN QUANT: CPT

## 2024-07-27 PROCEDURE — 2500000003 HC RX 250 WO HCPCS: Performed by: NURSE PRACTITIONER

## 2024-07-27 PROCEDURE — 87635 SARS-COV-2 COVID-19 AMP PRB: CPT

## 2024-07-27 PROCEDURE — 81001 URINALYSIS AUTO W/SCOPE: CPT

## 2024-07-27 PROCEDURE — 83690 ASSAY OF LIPASE: CPT

## 2024-07-27 PROCEDURE — 80162 ASSAY OF DIGOXIN TOTAL: CPT

## 2024-07-27 PROCEDURE — 96374 THER/PROPH/DIAG INJ IV PUSH: CPT

## 2024-07-27 PROCEDURE — 80053 COMPREHEN METABOLIC PANEL: CPT

## 2024-07-27 RX ORDER — ONDANSETRON 2 MG/ML
4 INJECTION INTRAMUSCULAR; INTRAVENOUS ONCE
Status: COMPLETED | OUTPATIENT
Start: 2024-07-27 | End: 2024-07-27

## 2024-07-27 RX ORDER — POTASSIUM CHLORIDE 1.5 G/1.58G
20 POWDER, FOR SOLUTION ORAL DAILY
COMMUNITY

## 2024-07-27 RX ORDER — 0.9 % SODIUM CHLORIDE 0.9 %
500 INTRAVENOUS SOLUTION INTRAVENOUS ONCE
Status: COMPLETED | OUTPATIENT
Start: 2024-07-27 | End: 2024-07-27

## 2024-07-27 RX ORDER — DIGOXIN 125 MCG
125 TABLET ORAL EVERY OTHER DAY
COMMUNITY

## 2024-07-27 RX ORDER — FERROUS SULFATE 325(65) MG
325 TABLET ORAL 2 TIMES DAILY
COMMUNITY

## 2024-07-27 RX ORDER — FLUCONAZOLE 100 MG/1
200 TABLET ORAL DAILY
Status: ON HOLD | COMMUNITY
End: 2024-07-30

## 2024-07-27 RX ORDER — LISINOPRIL 5 MG/1
5 TABLET ORAL DAILY
COMMUNITY

## 2024-07-27 RX ORDER — NYSTATIN 10B UNIT
POWDER (EA) MISCELLANEOUS 2 TIMES DAILY
COMMUNITY

## 2024-07-27 RX ORDER — IPRATROPIUM BROMIDE AND ALBUTEROL SULFATE 2.5; .5 MG/3ML; MG/3ML
1 SOLUTION RESPIRATORY (INHALATION) EVERY 6 HOURS PRN
COMMUNITY

## 2024-07-27 RX ORDER — ACETAMINOPHEN 325 MG/1
650 TABLET ORAL EVERY 6 HOURS PRN
COMMUNITY

## 2024-07-27 RX ADMIN — FAMOTIDINE 20 MG: 10 INJECTION, SOLUTION INTRAVENOUS at 20:41

## 2024-07-27 RX ADMIN — CEFTRIAXONE SODIUM 1000 MG: 1 INJECTION, POWDER, FOR SOLUTION INTRAMUSCULAR; INTRAVENOUS at 23:53

## 2024-07-27 RX ADMIN — ONDANSETRON 4 MG: 2 INJECTION INTRAMUSCULAR; INTRAVENOUS at 20:41

## 2024-07-27 RX ADMIN — SODIUM CHLORIDE 500 ML: 9 INJECTION, SOLUTION INTRAVENOUS at 20:40

## 2024-07-27 ASSESSMENT — PAIN - FUNCTIONAL ASSESSMENT: PAIN_FUNCTIONAL_ASSESSMENT: NONE - DENIES PAIN

## 2024-07-28 PROBLEM — K75.81 LIVER CIRRHOSIS SECONDARY TO NONALCOHOLIC STEATOHEPATITIS (NASH) (HCC): Status: ACTIVE | Noted: 2024-04-12

## 2024-07-28 PROBLEM — J44.9 COPD (CHRONIC OBSTRUCTIVE PULMONARY DISEASE) (HCC): Status: ACTIVE | Noted: 2023-12-21

## 2024-07-28 PROBLEM — I48.91 ATRIAL FIBRILLATION WITH RAPID VENTRICULAR RESPONSE (HCC): Status: ACTIVE | Noted: 2021-07-14

## 2024-07-28 PROBLEM — E11.21 DIABETIC RENAL DISEASE (HCC): Status: ACTIVE | Noted: 2023-10-25

## 2024-07-28 PROBLEM — K21.9 GASTROESOPHAGEAL REFLUX DISEASE WITHOUT ESOPHAGITIS: Status: ACTIVE | Noted: 2023-10-25

## 2024-07-28 PROBLEM — Z85.3 HISTORY OF BREAST CANCER: Status: ACTIVE | Noted: 2023-12-21

## 2024-07-28 PROBLEM — Z79.01 CHRONIC ANTICOAGULATION: Status: ACTIVE | Noted: 2023-12-21

## 2024-07-28 PROBLEM — D63.1 ANEMIA IN CHRONIC KIDNEY DISEASE: Status: ACTIVE | Noted: 2024-05-10

## 2024-07-28 PROBLEM — N30.21 CHRONIC CYSTITIS WITH HEMATURIA: Status: ACTIVE | Noted: 2023-10-25

## 2024-07-28 PROBLEM — E11.9 TYPE 2 DIABETES MELLITUS WITHOUT COMPLICATION, WITHOUT LONG-TERM CURRENT USE OF INSULIN (HCC): Status: ACTIVE | Noted: 2023-10-25

## 2024-07-28 PROBLEM — N18.9 ANEMIA IN CHRONIC KIDNEY DISEASE: Status: ACTIVE | Noted: 2024-05-10

## 2024-07-28 PROBLEM — E78.5 HYPERLIPIDEMIA: Status: ACTIVE | Noted: 2019-10-10

## 2024-07-28 PROBLEM — I21.4 NSTEMI (NON-ST ELEVATED MYOCARDIAL INFARCTION) (HCC): Status: ACTIVE | Noted: 2024-07-28

## 2024-07-28 PROBLEM — K74.60 LIVER CIRRHOSIS SECONDARY TO NONALCOHOLIC STEATOHEPATITIS (NASH) (HCC): Status: ACTIVE | Noted: 2024-04-12

## 2024-07-28 PROBLEM — N18.32 STAGE 3B CHRONIC KIDNEY DISEASE (HCC): Status: ACTIVE | Noted: 2023-10-25

## 2024-07-28 LAB
ANION GAP SERPL CALCULATED.3IONS-SCNC: 11 MMOL/L (ref 9–17)
BUN SERPL-MCNC: 10 MG/DL (ref 8–23)
CALCIUM SERPL-MCNC: 8.3 MG/DL (ref 8.6–10.4)
CHLORIDE SERPL-SCNC: 98 MMOL/L (ref 98–107)
CHOLEST SERPL-MCNC: 144 MG/DL (ref 0–199)
CHOLESTEROL/HDL RATIO: 7
CO2 SERPL-SCNC: 22 MMOL/L (ref 20–31)
CREAT SERPL-MCNC: 1.2 MG/DL (ref 0.5–0.9)
DATE LAST DOSE: ABNORMAL
DIGOXIN DOSE TIME: 900
DIGOXIN DOSE: ABNORMAL MG
DIGOXIN SERPL-MCNC: 0.6 NG/ML (ref 0.8–2)
DIGOXIN SERPL-MCNC: 0.7 NG/ML (ref 0.8–2)
ERYTHROCYTE [DISTWIDTH] IN BLOOD BY AUTOMATED COUNT: 22.5 % (ref 12.5–15.4)
GFR, ESTIMATED: 47 ML/MIN/1.73M2
GLUCOSE BLD-MCNC: 105 MG/DL (ref 65–105)
GLUCOSE BLD-MCNC: 79 MG/DL (ref 65–105)
GLUCOSE BLD-MCNC: 91 MG/DL (ref 65–105)
GLUCOSE SERPL-MCNC: 78 MG/DL (ref 70–99)
HCT VFR BLD AUTO: 31.1 % (ref 36–46)
HDLC SERPL-MCNC: 21 MG/DL
HGB BLD-MCNC: 9.8 G/DL (ref 12–16)
LDLC SERPL CALC-MCNC: 97 MG/DL (ref 0–100)
MAGNESIUM SERPL-MCNC: 1.5 MG/DL (ref 1.6–2.6)
MCH RBC QN AUTO: 27.2 PG (ref 26–34)
MCHC RBC AUTO-ENTMCNC: 31.5 G/DL (ref 31–37)
MCV RBC AUTO: 86.6 FL (ref 80–100)
PLATELET # BLD AUTO: 218 K/UL (ref 140–450)
PMV BLD AUTO: 7.6 FL (ref 6–12)
POTASSIUM SERPL-SCNC: 4.8 MMOL/L (ref 3.7–5.3)
RBC # BLD AUTO: 3.59 M/UL (ref 4–5.2)
SODIUM SERPL-SCNC: 131 MMOL/L (ref 135–144)
TRIGL SERPL-MCNC: 132 MG/DL
TROPONIN I SERPL HS-MCNC: 72 NG/L (ref 0–14)
TROPONIN I SERPL HS-MCNC: 73 NG/L (ref 0–14)
VLDLC SERPL CALC-MCNC: 26 MG/DL
WBC OTHER # BLD: 6 K/UL (ref 3.5–11)

## 2024-07-28 PROCEDURE — 82947 ASSAY GLUCOSE BLOOD QUANT: CPT

## 2024-07-28 PROCEDURE — 2580000003 HC RX 258: Performed by: NURSE PRACTITIONER

## 2024-07-28 PROCEDURE — 6360000002 HC RX W HCPCS: Performed by: NURSE PRACTITIONER

## 2024-07-28 PROCEDURE — 97530 THERAPEUTIC ACTIVITIES: CPT

## 2024-07-28 PROCEDURE — 87449 NOS EACH ORGANISM AG IA: CPT

## 2024-07-28 PROCEDURE — 97535 SELF CARE MNGMENT TRAINING: CPT

## 2024-07-28 PROCEDURE — 85027 COMPLETE CBC AUTOMATED: CPT

## 2024-07-28 PROCEDURE — 87506 IADNA-DNA/RNA PROBE TQ 6-11: CPT

## 2024-07-28 PROCEDURE — 84484 ASSAY OF TROPONIN QUANT: CPT

## 2024-07-28 PROCEDURE — 2580000003 HC RX 258: Performed by: STUDENT IN AN ORGANIZED HEALTH CARE EDUCATION/TRAINING PROGRAM

## 2024-07-28 PROCEDURE — 97162 PT EVAL MOD COMPLEX 30 MIN: CPT

## 2024-07-28 PROCEDURE — 97166 OT EVAL MOD COMPLEX 45 MIN: CPT

## 2024-07-28 PROCEDURE — 6370000000 HC RX 637 (ALT 250 FOR IP): Performed by: NURSE PRACTITIONER

## 2024-07-28 PROCEDURE — 83735 ASSAY OF MAGNESIUM: CPT

## 2024-07-28 PROCEDURE — 80162 ASSAY OF DIGOXIN TOTAL: CPT

## 2024-07-28 PROCEDURE — 1210000000 HC MED SURG R&B

## 2024-07-28 PROCEDURE — 99222 1ST HOSP IP/OBS MODERATE 55: CPT | Performed by: STUDENT IN AN ORGANIZED HEALTH CARE EDUCATION/TRAINING PROGRAM

## 2024-07-28 PROCEDURE — 6370000000 HC RX 637 (ALT 250 FOR IP): Performed by: STUDENT IN AN ORGANIZED HEALTH CARE EDUCATION/TRAINING PROGRAM

## 2024-07-28 PROCEDURE — 87324 CLOSTRIDIUM AG IA: CPT

## 2024-07-28 PROCEDURE — 80061 LIPID PANEL: CPT

## 2024-07-28 PROCEDURE — 36415 COLL VENOUS BLD VENIPUNCTURE: CPT

## 2024-07-28 PROCEDURE — 80048 BASIC METABOLIC PNL TOTAL CA: CPT

## 2024-07-28 RX ORDER — SODIUM CHLORIDE 0.9 % (FLUSH) 0.9 %
5-40 SYRINGE (ML) INJECTION EVERY 12 HOURS SCHEDULED
Status: DISCONTINUED | OUTPATIENT
Start: 2024-07-28 | End: 2024-07-30 | Stop reason: HOSPADM

## 2024-07-28 RX ORDER — NITROGLYCERIN 0.4 MG/1
0.4 TABLET SUBLINGUAL EVERY 5 MIN PRN
Status: DISCONTINUED | OUTPATIENT
Start: 2024-07-28 | End: 2024-07-30 | Stop reason: HOSPADM

## 2024-07-28 RX ORDER — IPRATROPIUM BROMIDE AND ALBUTEROL SULFATE 2.5; .5 MG/3ML; MG/3ML
1 SOLUTION RESPIRATORY (INHALATION) EVERY 6 HOURS PRN
Status: DISCONTINUED | OUTPATIENT
Start: 2024-07-28 | End: 2024-07-30 | Stop reason: HOSPADM

## 2024-07-28 RX ORDER — ACETAMINOPHEN 325 MG/1
650 TABLET ORAL EVERY 6 HOURS PRN
Status: DISCONTINUED | OUTPATIENT
Start: 2024-07-28 | End: 2024-07-30 | Stop reason: HOSPADM

## 2024-07-28 RX ORDER — POTASSIUM CHLORIDE 20 MEQ/1
40 TABLET, EXTENDED RELEASE ORAL PRN
Status: DISCONTINUED | OUTPATIENT
Start: 2024-07-28 | End: 2024-07-30 | Stop reason: HOSPADM

## 2024-07-28 RX ORDER — SODIUM CHLORIDE 0.9 % (FLUSH) 0.9 %
10 SYRINGE (ML) INJECTION PRN
Status: DISCONTINUED | OUTPATIENT
Start: 2024-07-28 | End: 2024-07-30 | Stop reason: HOSPADM

## 2024-07-28 RX ORDER — MAGNESIUM SULFATE 1 G/100ML
1000 INJECTION INTRAVENOUS PRN
Status: DISCONTINUED | OUTPATIENT
Start: 2024-07-28 | End: 2024-07-28

## 2024-07-28 RX ORDER — ONDANSETRON 4 MG/1
4 TABLET, ORALLY DISINTEGRATING ORAL EVERY 8 HOURS PRN
Status: DISCONTINUED | OUTPATIENT
Start: 2024-07-28 | End: 2024-07-30 | Stop reason: HOSPADM

## 2024-07-28 RX ORDER — POTASSIUM CHLORIDE 7.45 MG/ML
10 INJECTION INTRAVENOUS PRN
Status: DISCONTINUED | OUTPATIENT
Start: 2024-07-28 | End: 2024-07-30 | Stop reason: HOSPADM

## 2024-07-28 RX ORDER — SODIUM BICARBONATE 650 MG/1
1300 TABLET ORAL 2 TIMES DAILY
Status: DISCONTINUED | OUTPATIENT
Start: 2024-07-28 | End: 2024-07-30 | Stop reason: HOSPADM

## 2024-07-28 RX ORDER — MAGNESIUM SULFATE IN WATER 40 MG/ML
2000 INJECTION, SOLUTION INTRAVENOUS ONCE
Status: DISCONTINUED | OUTPATIENT
Start: 2024-07-28 | End: 2024-07-28

## 2024-07-28 RX ORDER — PANTOPRAZOLE SODIUM 40 MG/1
40 TABLET, DELAYED RELEASE ORAL
Status: DISCONTINUED | OUTPATIENT
Start: 2024-07-28 | End: 2024-07-30 | Stop reason: HOSPADM

## 2024-07-28 RX ORDER — GABAPENTIN 100 MG/1
100 CAPSULE ORAL 2 TIMES DAILY
Status: DISCONTINUED | OUTPATIENT
Start: 2024-07-28 | End: 2024-07-30 | Stop reason: HOSPADM

## 2024-07-28 RX ORDER — DEXTROSE MONOHYDRATE 100 MG/ML
INJECTION, SOLUTION INTRAVENOUS CONTINUOUS PRN
Status: DISCONTINUED | OUTPATIENT
Start: 2024-07-28 | End: 2024-07-30 | Stop reason: HOSPADM

## 2024-07-28 RX ORDER — ACETAMINOPHEN 650 MG/1
650 SUPPOSITORY RECTAL EVERY 6 HOURS PRN
Status: DISCONTINUED | OUTPATIENT
Start: 2024-07-28 | End: 2024-07-30 | Stop reason: HOSPADM

## 2024-07-28 RX ORDER — ASPIRIN 81 MG/1
81 TABLET, CHEWABLE ORAL DAILY
Status: DISCONTINUED | OUTPATIENT
Start: 2024-07-29 | End: 2024-07-30 | Stop reason: HOSPADM

## 2024-07-28 RX ORDER — ATORVASTATIN CALCIUM 40 MG/1
80 TABLET, FILM COATED ORAL NIGHTLY
Status: DISCONTINUED | OUTPATIENT
Start: 2024-07-28 | End: 2024-07-30 | Stop reason: HOSPADM

## 2024-07-28 RX ORDER — MAGNESIUM SULFATE 1 G/100ML
1000 INJECTION INTRAVENOUS
Status: COMPLETED | OUTPATIENT
Start: 2024-07-28 | End: 2024-07-28

## 2024-07-28 RX ORDER — INSULIN LISPRO 100 [IU]/ML
0-8 INJECTION, SOLUTION INTRAVENOUS; SUBCUTANEOUS
Status: DISCONTINUED | OUTPATIENT
Start: 2024-07-28 | End: 2024-07-30 | Stop reason: HOSPADM

## 2024-07-28 RX ORDER — ONDANSETRON 2 MG/ML
4 INJECTION INTRAMUSCULAR; INTRAVENOUS EVERY 6 HOURS PRN
Status: DISCONTINUED | OUTPATIENT
Start: 2024-07-28 | End: 2024-07-30 | Stop reason: HOSPADM

## 2024-07-28 RX ORDER — INSULIN LISPRO 100 [IU]/ML
0-4 INJECTION, SOLUTION INTRAVENOUS; SUBCUTANEOUS NIGHTLY
Status: DISCONTINUED | OUTPATIENT
Start: 2024-07-28 | End: 2024-07-30 | Stop reason: HOSPADM

## 2024-07-28 RX ORDER — SODIUM CHLORIDE 9 MG/ML
INJECTION, SOLUTION INTRAVENOUS CONTINUOUS
Status: ACTIVE | OUTPATIENT
Start: 2024-07-28 | End: 2024-07-28

## 2024-07-28 RX ORDER — MIRTAZAPINE 15 MG/1
30 TABLET, FILM COATED ORAL NIGHTLY
Status: DISCONTINUED | OUTPATIENT
Start: 2024-07-28 | End: 2024-07-30 | Stop reason: HOSPADM

## 2024-07-28 RX ORDER — SODIUM CHLORIDE 9 MG/ML
INJECTION, SOLUTION INTRAVENOUS PRN
Status: DISCONTINUED | OUTPATIENT
Start: 2024-07-28 | End: 2024-07-30 | Stop reason: HOSPADM

## 2024-07-28 RX ORDER — GLUCAGON 1 MG/ML
1 KIT INJECTION PRN
Status: DISCONTINUED | OUTPATIENT
Start: 2024-07-28 | End: 2024-07-30 | Stop reason: HOSPADM

## 2024-07-28 RX ORDER — DIGOXIN 125 MCG
125 TABLET ORAL EVERY OTHER DAY
Status: DISCONTINUED | OUTPATIENT
Start: 2024-07-28 | End: 2024-07-30 | Stop reason: HOSPADM

## 2024-07-28 RX ORDER — SODIUM CHLORIDE 9 MG/ML
INJECTION, SOLUTION INTRAVENOUS CONTINUOUS
Status: DISCONTINUED | OUTPATIENT
Start: 2024-07-28 | End: 2024-07-28

## 2024-07-28 RX ORDER — FLUCONAZOLE 100 MG/1
200 TABLET ORAL DAILY
Status: COMPLETED | OUTPATIENT
Start: 2024-07-28 | End: 2024-07-30

## 2024-07-28 RX ADMIN — CEFTRIAXONE SODIUM 1000 MG: 1 INJECTION, POWDER, FOR SOLUTION INTRAMUSCULAR; INTRAVENOUS at 23:39

## 2024-07-28 RX ADMIN — GABAPENTIN 100 MG: 100 CAPSULE ORAL at 20:42

## 2024-07-28 RX ADMIN — GABAPENTIN 100 MG: 100 CAPSULE ORAL at 09:08

## 2024-07-28 RX ADMIN — MAGNESIUM SULFATE HEPTAHYDRATE 1000 MG: 1 INJECTION, SOLUTION INTRAVENOUS at 10:15

## 2024-07-28 RX ADMIN — SODIUM BICARBONATE 1300 MG: 650 TABLET ORAL at 09:08

## 2024-07-28 RX ADMIN — ATORVASTATIN CALCIUM 80 MG: 40 TABLET, FILM COATED ORAL at 20:42

## 2024-07-28 RX ADMIN — MIRTAZAPINE 30 MG: 15 TABLET, FILM COATED ORAL at 20:41

## 2024-07-28 RX ADMIN — APIXABAN 5 MG: 5 TABLET, FILM COATED ORAL at 20:42

## 2024-07-28 RX ADMIN — DIGOXIN 125 MCG: 125 TABLET ORAL at 09:08

## 2024-07-28 RX ADMIN — APIXABAN 5 MG: 5 TABLET, FILM COATED ORAL at 09:08

## 2024-07-28 RX ADMIN — SODIUM CHLORIDE: 9 INJECTION, SOLUTION INTRAVENOUS at 09:10

## 2024-07-28 RX ADMIN — SODIUM CHLORIDE 250 ML: 9 INJECTION, SOLUTION INTRAVENOUS at 23:38

## 2024-07-28 RX ADMIN — FLUCONAZOLE 200 MG: 100 TABLET ORAL at 09:08

## 2024-07-28 RX ADMIN — DEXTROSE MONOHYDRATE 1000 ML: 50 INJECTION, SOLUTION INTRAVENOUS at 16:36

## 2024-07-28 RX ADMIN — SODIUM BICARBONATE 1300 MG: 650 TABLET ORAL at 20:42

## 2024-07-28 RX ADMIN — PANTOPRAZOLE SODIUM 40 MG: 40 TABLET, DELAYED RELEASE ORAL at 09:18

## 2024-07-28 RX ADMIN — MAGNESIUM SULFATE HEPTAHYDRATE 1000 MG: 1 INJECTION, SOLUTION INTRAVENOUS at 09:11

## 2024-07-28 RX ADMIN — SODIUM CHLORIDE, PRESERVATIVE FREE 10 ML: 5 INJECTION INTRAVENOUS at 09:08

## 2024-07-28 NOTE — ED PROVIDER NOTES
[address]  EMERGENCY DEPARTMENT ENCOUNTER      Pt Name: Tita Shrestha  MRN: [id number]  Birthdate 1945  Date of evaluation: 7/27/2024  Provider: SHA Ferrari CNP  11:52 AM    CHIEF COMPLAINT       Chief Complaint   Patient presents with    Emesis     Intermit emesis for the past 4 days, phenergan IV admin PTA          HISTORY OF PRESENT ILLNESS    Tita Shrestha is a 78 y.o. female who presents to the emergency department for evaluation of 4 days of intermittent nausea.  Patient reports some right upper quadrant abdominal pain.  Denies any chest pain or shortness of breath.  No urinary symptoms.  No constipation.  She really is a poor historian.  She currently resides at a local nursing home.  This is a recent development.  She has a history of atrial fibrillation and some falls.  She had a recent stay in the hospital and then was placed in a rehab facility.  Family reports concern for failure to thrive    HPI    Nursing Notes were reviewed.    REVIEW OF SYSTEMS       Review of Systems   All other systems reviewed and are negative.      Except as noted above the remainder of the review of systems was reviewed and negative.       PAST MEDICAL HISTORY     Past Medical History:   Diagnosis Date    A-fib (HCC)     Aneurysm, cerebral 05/22/2017    Arthritis     gout    Breast cancer (HCC) 2011    left-lumpectomy followed by chemo and radiation    Diabetes mellitus (HCC)     not any more, ACTOS discontinued     H/O transfusion 2011    2 Units    Hyperlipidemia     ON RX    Hypertension     ON RX    Porphyria (HCC)     Wears glasses          SURGICAL HISTORY       Past Surgical History:   Procedure Laterality Date    BREAST LUMPECTOMY Left 2011    Chemo & Radiation    COLONOSCOPY N/A 5/24/2019    COLONOSCOPY POLYPECTOMY HOT BIOPSY performed by Russ Barton MD at [facility] OR    HYSTERECTOMY (CERVIX STATUS UNKNOWN)  1989    TOTAL    OTHER SURGICAL HISTORY  06/2017    CEREBRA EMBO COILING WITH STENT

## 2024-07-28 NOTE — CONSULTS
[facility]i [facility] M.D., M.H.A.  Donis Montague M.D., M.B.A.  REYNA Rene P.A.C.    [facility]  [address]    Cardio-Oncology Service  [address]  [place] [address]   [address] [place] [address]  Phone: [phone]   [place] [address]   Phone: [phone]  Fax: [phone]   [phone]  Fax: [phone]    Name:   Tita Shrestha :  1945   MRN:   [id number] Gender:   female   PCP:  Jake Berrios MD Age: 78 y.o.   PCP Fax:  [phone]     Hospital:          [facility]   Encounter Date:     24        Reason for Consult: NSTEMI     Primary Cardiologist: PPC  HPI: Tita Shrestha is an 78 y.o. female who presented to [facility] ED due to worsening nausea with intermittent emesis over the last 4 days.    Per patient reports she has been dealing with increased burning with urination as well as diarrhea.  Also reports worsening shortness of breath over the last few months.  Per family there was some concern that patient was not eating and drinking at home either.    Patient evaluated in the ED.  UA positive for urinary tract infection  Stool being run for C. difficile infection  Patient noted to have elevated troponin however this level was flat on repeat x 3.    Patient ultimately admitted to hospital for NSTEMI, comorbidities and acute cystitis.    Workup from the ED was reviewed.  CBC: White blood cell 6.0, hemoglobin 9.8, hematocrit 31.1, platelets 218  BMP: Sodium 131, potassium 4.8, chloride 98, CO2 22, BUN 10, creatinine 1.2  Magnesium 1.5  proBNP 2077  High-sensitivity troponin 75, 74, 73, 72  INR 1.4    Spoke with patient today in her room.  She reports she is sore throughout her body which is a chronic issue.  Reports her nausea has improved today.  Denies any sort of chest pain chest

## 2024-07-28 NOTE — H&P
Absolute 3.34 1.8 - 7.7 k/uL    Lymphocytes Absolute 2.02 1.0 - 4.8 k/uL    Monocytes Absolute 0.63 0.1 - 1.2 k/uL    Eosinophils Absolute 0.25 0.0 - 0.4 k/uL    Basophils Absolute 0.06 0.0 - 0.2 k/uL    Morphology ANISOCYTOSIS PRESENT     Morphology HYPOCHROMIA PRESENT    CMP    Collection Time: 07/27/24  8:08 PM   Result Value Ref Range    Sodium 131 (L) 135 - 144 mmol/L    Potassium 5.0 3.7 - 5.3 mmol/L    Chloride 95 (L) 98 - 107 mmol/L    CO2 21 20 - 31 mmol/L    Anion Gap 15 9 - 17 mmol/L    Glucose 81 70 - 99 mg/dL    BUN 11 8 - 23 mg/dL    Creatinine 1.1 (H) 0.5 - 0.9 mg/dL    Est, Glom Filt Rate 52 (L) >60 mL/min/1.73m2    Calcium 8.3 (L) 8.6 - 10.4 mg/dL    Total Protein 5.5 (L) 6.4 - 8.3 g/dL    Albumin 2.8 (L) 3.5 - 5.2 g/dL    Albumin/Globulin Ratio 1.0 1.0 - 2.5    Total Bilirubin 0.7 0.3 - 1.2 mg/dL    Alkaline Phosphatase 96 35 - 104 U/L    ALT 5 5 - 33 U/L    AST 17 <32 U/L   Lactic Acid    Collection Time: 07/27/24  8:08 PM   Result Value Ref Range    Lactic Acid 1.5 0.5 - 2.2 mmol/L   Troponin    Collection Time: 07/27/24  8:08 PM   Result Value Ref Range    Troponin, High Sensitivity 75 (HH) 0 - 14 ng/L   Lipase    Collection Time: 07/27/24  8:08 PM   Result Value Ref Range    Lipase 18 13 - 60 U/L   Protime-INR    Collection Time: 07/27/24  8:08 PM   Result Value Ref Range    Protime 14.4 (H) 9.4 - 12.6 sec    INR 1.4    Brain Natriuretic Peptide    Collection Time: 07/27/24  8:08 PM   Result Value Ref Range    Pro-BNP 2,077 (H) <300 pg/mL   COVID-19, Rapid    Collection Time: 07/27/24  9:13 PM    Specimen: Nasopharyngeal Swab   Result Value Ref Range    Specimen Description .NASOPHARYNGEAL SWAB     SARS-CoV-2, Rapid Not Detected Not Detected   Urinalysis with Reflex to Culture    Collection Time: 07/27/24  9:36 PM    Specimen: Urine   Result Value Ref Range    Color, UA Yellow Yellow    Turbidity UA Cloudy (A) Clear    Glucose, Ur NEGATIVE NEGATIVE mg/dL    Bilirubin, Urine MODERATE (A)

## 2024-07-28 NOTE — ED NOTES
Patients son and daughter in law at bedside, expressed concerns with patients mental health, states that patient may not be eating by choice and \"starving herself.\" Vane NP updated.

## 2024-07-28 NOTE — ED PROVIDER NOTES
[facility] Emergency Department  [address]  [place] [address]  Phone: [phone]  Fax: [phone]      Attending Physician Attestation    I performed a history and physical examination of the patient and discussed management with the mid level provider. I reviewed the mid level provider's note and agree with the documented findings and plan of care. Any areas of disagreement are noted on the chart. I was personally present for the key portions of any procedures. I have documented in the chart those procedures where I was not present during the key portions. I have reviewed the emergency nurses triage note. I agree with the chief complaint, past medical history, past surgical history, allergies, medications, social and family history as documented unless otherwise noted below. Documentation of the HPI, Physical Exam and Medical Decision Making performed by mid level providers is based on my personal performance of the HPI, PE and MDM. For Physician Assistant/ Nurse Practitioner cases/documentation I have personally evaluated this patient and have completed at least one if not all key elements of the E/M (history, physical exam, and MDM). Additional findings are as noted.      CHIEF COMPLAINT       Chief Complaint   Patient presents with    Emesis     Intermit emesis for the past 4 days, phenergan IV admin PTA          HISTORY OF PRESENT ILLNESS    Tita Shrestha is a 78 y.o. female who presents complaining of a 4-day history of nausea and vomiting.  She denies any pain.  No shortness of breath.  No diaphoresis.  She reports she cannot keep anything down.  No mitigating precipitating exacerbating factors.  No known sick contacts.  No diarrhea.      PAST MEDICAL HISTORY    has a past medical history of A-fib (HCC), Aneurysm, cerebral, Arthritis, Breast cancer (HCC), Diabetes mellitus (HCC), H/O transfusion, Hyperlipidemia, Hypertension, Porphyria (HCC), and Wears

## 2024-07-29 ENCOUNTER — APPOINTMENT (OUTPATIENT)
Dept: GENERAL RADIOLOGY | Age: 79
DRG: 757 | End: 2024-07-29
Payer: MEDICARE

## 2024-07-29 ENCOUNTER — APPOINTMENT (OUTPATIENT)
Dept: ULTRASOUND IMAGING | Age: 79
DRG: 757 | End: 2024-07-29
Attending: STUDENT IN AN ORGANIZED HEALTH CARE EDUCATION/TRAINING PROGRAM
Payer: MEDICARE

## 2024-07-29 ENCOUNTER — APPOINTMENT (OUTPATIENT)
Age: 79
DRG: 757 | End: 2024-07-29
Attending: STUDENT IN AN ORGANIZED HEALTH CARE EDUCATION/TRAINING PROGRAM
Payer: MEDICARE

## 2024-07-29 LAB
ANION GAP SERPL CALCULATED.3IONS-SCNC: 10 MMOL/L (ref 9–17)
BASOPHILS # BLD: 0 K/UL (ref 0–0.2)
BASOPHILS NFR BLD: 0 % (ref 0–2)
BUN SERPL-MCNC: 10 MG/DL (ref 8–23)
CALCIUM SERPL-MCNC: 7.9 MG/DL (ref 8.6–10.4)
CAMPYLOBACTER DNA SPEC NAA+PROBE: NORMAL
CHLORIDE SERPL-SCNC: 96 MMOL/L (ref 98–107)
CO2 SERPL-SCNC: 24 MMOL/L (ref 20–31)
CREAT SERPL-MCNC: 1.2 MG/DL (ref 0.5–0.9)
ECHO AO ROOT DIAM: 3.2 CM
ECHO AO ROOT INDEX: 1.43 CM/M2
ECHO BSA: 2.31 M2
ECHO EST RA PRESSURE: 10 MMHG
ECHO IVC PROX: 2.7 CM
ECHO LA DIAMETER INDEX: 1.88 CM/M2
ECHO LA DIAMETER: 4.2 CM
ECHO LA TO AORTIC ROOT RATIO: 1.31
ECHO LV EDV A2C: 58 ML
ECHO LV EDV A4C: 78 ML
ECHO LV EDV INDEX A4C: 35 ML/M2
ECHO LV EDV NDEX A2C: 26 ML/M2
ECHO LV EJECTION FRACTION A2C: 59 %
ECHO LV EJECTION FRACTION A4C: 63 %
ECHO LV EJECTION FRACTION BIPLANE: 62 % (ref 55–100)
ECHO LV ESV A2C: 24 ML
ECHO LV ESV A4C: 29 ML
ECHO LV ESV INDEX A2C: 11 ML/M2
ECHO LV ESV INDEX A4C: 13 ML/M2
ECHO LV FRACTIONAL SHORTENING: 31 % (ref 28–44)
ECHO LV INTERNAL DIMENSION DIASTOLE INDEX: 2.29 CM/M2
ECHO LV INTERNAL DIMENSION DIASTOLIC: 5.1 CM (ref 3.9–5.3)
ECHO LV INTERNAL DIMENSION SYSTOLIC INDEX: 1.57 CM/M2
ECHO LV INTERNAL DIMENSION SYSTOLIC: 3.5 CM
ECHO LV IVSD: 1.1 CM (ref 0.6–0.9)
ECHO LV MASS 2D: 213.9 G (ref 67–162)
ECHO LV MASS INDEX 2D: 95.9 G/M2 (ref 43–95)
ECHO LV POSTERIOR WALL DIASTOLIC: 1.1 CM (ref 0.6–0.9)
ECHO LV RELATIVE WALL THICKNESS RATIO: 0.43
ECHO LVOT AREA: 3.5 CM2
ECHO LVOT DIAM: 2.1 CM
ECHO RIGHT VENTRICULAR SYSTOLIC PRESSURE (RVSP): 63 MMHG
ECHO TV REGURGITANT MAX VELOCITY: 3.65 M/S
ECHO TV REGURGITANT PEAK GRADIENT: 53 MMHG
EOSINOPHIL # BLD: 0.2 K/UL (ref 0–0.4)
EOSINOPHILS RELATIVE PERCENT: 4 % (ref 1–4)
ERYTHROCYTE [DISTWIDTH] IN BLOOD BY AUTOMATED COUNT: 22.9 % (ref 12.5–15.4)
ETEC ELTA+ESTB GENES STL QL NAA+PROBE: NORMAL
GFR, ESTIMATED: 47 ML/MIN/1.73M2
GLUCOSE BLD-MCNC: 88 MG/DL (ref 65–105)
GLUCOSE BLD-MCNC: 88 MG/DL (ref 65–105)
GLUCOSE BLD-MCNC: 91 MG/DL (ref 65–105)
GLUCOSE SERPL-MCNC: 98 MG/DL (ref 70–99)
HCT VFR BLD AUTO: 28.9 % (ref 36–46)
HGB BLD-MCNC: 9.2 G/DL (ref 12–16)
INR PPP: 1.5
LYMPHOCYTES NFR BLD: 1.15 K/UL (ref 1–4.8)
LYMPHOCYTES RELATIVE PERCENT: 23 % (ref 24–44)
MCH RBC QN AUTO: 27.3 PG (ref 26–34)
MCHC RBC AUTO-ENTMCNC: 31.9 G/DL (ref 31–37)
MCV RBC AUTO: 85.8 FL (ref 80–100)
MONOCYTES NFR BLD: 0.5 K/UL (ref 0.1–0.8)
MONOCYTES NFR BLD: 10 % (ref 1–7)
MORPHOLOGY: ABNORMAL
NEUTROPHILS NFR BLD: 63 % (ref 36–66)
NEUTS SEG NFR BLD: 3.15 K/UL (ref 1.8–7.7)
P SHIGELLOIDES DNA STL QL NAA+PROBE: NORMAL
PLATELET # BLD AUTO: 213 K/UL (ref 140–450)
PMV BLD AUTO: 8.6 FL (ref 6–12)
POTASSIUM SERPL-SCNC: 4.4 MMOL/L (ref 3.7–5.3)
PROTHROMBIN TIME: 15.3 SEC (ref 9.4–12.6)
RBC # BLD AUTO: 3.37 M/UL (ref 4–5.2)
SALMONELLA DNA SPEC QL NAA+PROBE: NORMAL
SHIGA TOXIN STX GENE SPEC NAA+PROBE: NORMAL
SHIGELLA DNA SPEC QL NAA+PROBE: NORMAL
SODIUM SERPL-SCNC: 130 MMOL/L (ref 135–144)
SPECIMEN DESCRIPTION: NORMAL
V CHOL+PARA RFBL+TRKH+TNAA STL QL NAA+PR: NORMAL
WBC OTHER # BLD: 5 K/UL (ref 3.5–11)
Y ENTERO RECN STL QL NAA+PROBE: NORMAL

## 2024-07-29 PROCEDURE — 6370000000 HC RX 637 (ALT 250 FOR IP): Performed by: STUDENT IN AN ORGANIZED HEALTH CARE EDUCATION/TRAINING PROGRAM

## 2024-07-29 PROCEDURE — 99232 SBSQ HOSP IP/OBS MODERATE 35: CPT | Performed by: STUDENT IN AN ORGANIZED HEALTH CARE EDUCATION/TRAINING PROGRAM

## 2024-07-29 PROCEDURE — 2580000003 HC RX 258: Performed by: STUDENT IN AN ORGANIZED HEALTH CARE EDUCATION/TRAINING PROGRAM

## 2024-07-29 PROCEDURE — 6360000002 HC RX W HCPCS: Performed by: NURSE PRACTITIONER

## 2024-07-29 PROCEDURE — 2580000003 HC RX 258: Performed by: NURSE PRACTITIONER

## 2024-07-29 PROCEDURE — 87324 CLOSTRIDIUM AG IA: CPT

## 2024-07-29 PROCEDURE — 36415 COLL VENOUS BLD VENIPUNCTURE: CPT

## 2024-07-29 PROCEDURE — 93308 TTE F-UP OR LMTD: CPT | Performed by: INTERNAL MEDICINE

## 2024-07-29 PROCEDURE — 76770 US EXAM ABDO BACK WALL COMP: CPT

## 2024-07-29 PROCEDURE — 6370000000 HC RX 637 (ALT 250 FOR IP): Performed by: NURSE PRACTITIONER

## 2024-07-29 PROCEDURE — 87449 NOS EACH ORGANISM AG IA: CPT

## 2024-07-29 PROCEDURE — 92526 ORAL FUNCTION THERAPY: CPT

## 2024-07-29 PROCEDURE — 74230 X-RAY XM SWLNG FUNCJ C+: CPT

## 2024-07-29 PROCEDURE — 93308 TTE F-UP OR LMTD: CPT

## 2024-07-29 PROCEDURE — 85025 COMPLETE CBC W/AUTO DIFF WBC: CPT

## 2024-07-29 PROCEDURE — 85610 PROTHROMBIN TIME: CPT

## 2024-07-29 PROCEDURE — 92611 MOTION FLUOROSCOPY/SWALLOW: CPT

## 2024-07-29 PROCEDURE — 1210000000 HC MED SURG R&B

## 2024-07-29 PROCEDURE — 82947 ASSAY GLUCOSE BLOOD QUANT: CPT

## 2024-07-29 PROCEDURE — 80048 BASIC METABOLIC PNL TOTAL CA: CPT

## 2024-07-29 RX ORDER — SODIUM CHLORIDE 9 MG/ML
INJECTION, SOLUTION INTRAVENOUS CONTINUOUS
Status: ACTIVE | OUTPATIENT
Start: 2024-07-29 | End: 2024-07-29

## 2024-07-29 RX ADMIN — APIXABAN 5 MG: 5 TABLET, FILM COATED ORAL at 23:04

## 2024-07-29 RX ADMIN — GABAPENTIN 100 MG: 100 CAPSULE ORAL at 09:10

## 2024-07-29 RX ADMIN — ASPIRIN 81 MG: 81 TABLET, CHEWABLE ORAL at 09:11

## 2024-07-29 RX ADMIN — SODIUM CHLORIDE: 9 INJECTION, SOLUTION INTRAVENOUS at 18:33

## 2024-07-29 RX ADMIN — ACETAMINOPHEN 650 MG: 325 TABLET ORAL at 23:12

## 2024-07-29 RX ADMIN — SODIUM BICARBONATE 1300 MG: 650 TABLET ORAL at 23:04

## 2024-07-29 RX ADMIN — PANTOPRAZOLE SODIUM 40 MG: 40 TABLET, DELAYED RELEASE ORAL at 06:33

## 2024-07-29 RX ADMIN — APIXABAN 5 MG: 5 TABLET, FILM COATED ORAL at 09:10

## 2024-07-29 RX ADMIN — SODIUM CHLORIDE, PRESERVATIVE FREE 10 ML: 5 INJECTION INTRAVENOUS at 23:04

## 2024-07-29 RX ADMIN — FLUCONAZOLE 200 MG: 100 TABLET ORAL at 09:11

## 2024-07-29 RX ADMIN — SODIUM CHLORIDE: 9 INJECTION, SOLUTION INTRAVENOUS at 09:22

## 2024-07-29 RX ADMIN — CEFTRIAXONE SODIUM 1000 MG: 1 INJECTION, POWDER, FOR SOLUTION INTRAMUSCULAR; INTRAVENOUS at 23:15

## 2024-07-29 RX ADMIN — SODIUM CHLORIDE, PRESERVATIVE FREE 10 ML: 5 INJECTION INTRAVENOUS at 09:11

## 2024-07-29 RX ADMIN — ATORVASTATIN CALCIUM 80 MG: 40 TABLET, FILM COATED ORAL at 23:04

## 2024-07-29 RX ADMIN — MIRTAZAPINE 30 MG: 15 TABLET, FILM COATED ORAL at 23:04

## 2024-07-29 RX ADMIN — GABAPENTIN 100 MG: 100 CAPSULE ORAL at 23:04

## 2024-07-29 RX ADMIN — SODIUM BICARBONATE 1300 MG: 650 TABLET ORAL at 09:10

## 2024-07-29 ASSESSMENT — ENCOUNTER SYMPTOMS
VOMITING: 0
NAUSEA: 1
EYE ITCHING: 0
ABDOMINAL PAIN: 0
EYE DISCHARGE: 0
EYE REDNESS: 0
CONSTIPATION: 0
BACK PAIN: 0
ABDOMINAL DISTENTION: 0
WHEEZING: 0
PHOTOPHOBIA: 0
DIARRHEA: 0
CHEST TIGHTNESS: 0
TROUBLE SWALLOWING: 1
APNEA: 0
COLOR CHANGE: 0
FACIAL SWELLING: 0
SHORTNESS OF BREATH: 1
SORE THROAT: 1

## 2024-07-29 ASSESSMENT — PAIN SCALES - GENERAL: PAINLEVEL_OUTOF10: 4

## 2024-07-29 ASSESSMENT — PAIN DESCRIPTION - LOCATION: LOCATION: HEAD;NECK

## 2024-07-29 ASSESSMENT — PAIN DESCRIPTION - DESCRIPTORS: DESCRIPTORS: ACHING

## 2024-07-29 ASSESSMENT — PAIN - FUNCTIONAL ASSESSMENT: PAIN_FUNCTIONAL_ASSESSMENT: ACTIVITIES ARE NOT PREVENTED

## 2024-07-29 NOTE — PROCEDURES
INSTRUMENTAL SWALLOW REPORT  MODIFIED BARIUM SWALLOW    NAME: Tita Shrestha   : 1945  MRN: [id number]       Date of Eval: 2024              Referring Diagnosis(es):      Past Medical History:  has a past medical history of A-fib (HCC), Aneurysm, cerebral, Arthritis, Breast cancer (HCC), Diabetes mellitus (HCC), H/O transfusion, Hyperlipidemia, Hypertension, Porphyria (HCC), and Wears glasses.  Past Surgical History:  has a past surgical history that includes Hysterectomy (); Breast lumpectomy (Left, ); other surgical history (2017); other surgical history (Left, 2017); and Colonoscopy (N/A, 2019).     Type of Study: Initial MBS      Patient Complaints/Reason for Referral:  Tita Shrestha was referred for a MBS to assess the efficiency of his/her swallow function, assess for aspiration, and to make recommendations regarding safe dietary consistencies, effective compensatory strategies, and safe eating environment.       Onset of problem:      Tita Shrestha is a 78 y.o. Non- / non  female who presents with Emesis (Intermit emesis for the past 4 days, phenergan IV admin PTA )   and is admitted to the hospital for the management of NSTEMI (non-ST elevated myocardial infarction) ([facility]).     78 year old female with past medical history of afib, breast cancer with left lumpectomy, HLD< HTN, gout, obesity, COPD presents with shortness of breath, burning with urinartion and diarrhea. Patient states that for the past few months she has been having exertional dyspnea that is un resolving for the past 2-3 months. Has to take increased breaks when ambulating. Patient also endorsing burning with urination over the past week. Not resolving. Also endorsing waterry bowel movements thinks she had 2 watery bowel movements yesterday. Patient currently living at home, her son lives in [facility] and checks in on her as well as prepares her meals. EMR shows that patient has

## 2024-07-29 NOTE — CARE COORDINATION
CM attempted assessment patient OOB for swallow study at [facility], will follow up.    1500 CM spoke to patient she is agreeable to return to [place] [facility] [place] assessment deferred. ECHO setting up for testing.

## 2024-07-30 VITALS
HEART RATE: 68 BPM | BODY MASS INDEX: 37.22 KG/M2 | RESPIRATION RATE: 17 BRPM | DIASTOLIC BLOOD PRESSURE: 59 MMHG | WEIGHT: 245.59 LBS | SYSTOLIC BLOOD PRESSURE: 126 MMHG | HEIGHT: 68 IN | OXYGEN SATURATION: 98 % | TEMPERATURE: 98.1 F

## 2024-07-30 PROBLEM — F33.1 MDD (MAJOR DEPRESSIVE DISORDER), RECURRENT EPISODE, MODERATE (HCC): Status: ACTIVE | Noted: 2024-07-30

## 2024-07-30 LAB
ANION GAP SERPL CALCULATED.3IONS-SCNC: 13 MMOL/L (ref 9–17)
BUN SERPL-MCNC: 9 MG/DL (ref 8–23)
C DIFF GDH + TOXINS A+B STL QL IA.RAPID: NEGATIVE
CALCIUM SERPL-MCNC: 7.7 MG/DL (ref 8.6–10.4)
CHLORIDE SERPL-SCNC: 98 MMOL/L (ref 98–107)
CO2 SERPL-SCNC: 21 MMOL/L (ref 20–31)
CREAT SERPL-MCNC: 1.1 MG/DL (ref 0.5–0.9)
EKG ATRIAL RATE: 340 BPM
EKG Q-T INTERVAL: 388 MS
EKG QRS DURATION: 88 MS
EKG QTC CALCULATION (BAZETT): 412 MS
EKG R AXIS: 8 DEGREES
EKG T AXIS: 21 DEGREES
EKG VENTRICULAR RATE: 68 BPM
GFR, ESTIMATED: 52 ML/MIN/1.73M2
GLUCOSE BLD-MCNC: 86 MG/DL (ref 65–105)
GLUCOSE BLD-MCNC: 87 MG/DL (ref 65–105)
GLUCOSE SERPL-MCNC: 90 MG/DL (ref 70–99)
MICROORGANISM SPEC CULT: ABNORMAL
POTASSIUM SERPL-SCNC: 4.4 MMOL/L (ref 3.7–5.3)
SODIUM SERPL-SCNC: 132 MMOL/L (ref 135–144)
SPECIMEN DESCRIPTION: ABNORMAL
SPECIMEN DESCRIPTION: NORMAL

## 2024-07-30 PROCEDURE — 6370000000 HC RX 637 (ALT 250 FOR IP): Performed by: INTERNAL MEDICINE

## 2024-07-30 PROCEDURE — 97116 GAIT TRAINING THERAPY: CPT

## 2024-07-30 PROCEDURE — 90792 PSYCH DIAG EVAL W/MED SRVCS: CPT | Performed by: PSYCHIATRY & NEUROLOGY

## 2024-07-30 PROCEDURE — 80048 BASIC METABOLIC PNL TOTAL CA: CPT

## 2024-07-30 PROCEDURE — 97535 SELF CARE MNGMENT TRAINING: CPT

## 2024-07-30 PROCEDURE — 82947 ASSAY GLUCOSE BLOOD QUANT: CPT

## 2024-07-30 PROCEDURE — 36415 COLL VENOUS BLD VENIPUNCTURE: CPT

## 2024-07-30 PROCEDURE — 97530 THERAPEUTIC ACTIVITIES: CPT

## 2024-07-30 PROCEDURE — 6370000000 HC RX 637 (ALT 250 FOR IP): Performed by: NURSE PRACTITIONER

## 2024-07-30 PROCEDURE — 99239 HOSP IP/OBS DSCHRG MGMT >30: CPT | Performed by: INTERNAL MEDICINE

## 2024-07-30 PROCEDURE — 2580000003 HC RX 258: Performed by: NURSE PRACTITIONER

## 2024-07-30 PROCEDURE — 6370000000 HC RX 637 (ALT 250 FOR IP): Performed by: STUDENT IN AN ORGANIZED HEALTH CARE EDUCATION/TRAINING PROGRAM

## 2024-07-30 RX ORDER — MIRTAZAPINE 30 MG/1
30 TABLET, FILM COATED ORAL NIGHTLY
Qty: 30 TABLET | Refills: 0 | DISCHARGE
Start: 2024-07-30

## 2024-07-30 RX ORDER — FLUCONAZOLE 100 MG/1
200 TABLET ORAL DAILY
Qty: 4 TABLET | Refills: 0 | DISCHARGE
Start: 2024-07-30 | End: 2024-08-01

## 2024-07-30 RX ORDER — GABAPENTIN 100 MG/1
100 CAPSULE ORAL 2 TIMES DAILY
Qty: 60 CAPSULE | Refills: 0 | Status: SHIPPED | OUTPATIENT
Start: 2024-07-30 | End: 2024-08-29

## 2024-07-30 RX ORDER — CEFPODOXIME PROXETIL 100 MG/1
100 TABLET, FILM COATED ORAL 2 TIMES DAILY
Qty: 8 TABLET | Refills: 0 | DISCHARGE
Start: 2024-07-30 | End: 2024-08-03

## 2024-07-30 RX ORDER — FUROSEMIDE 20 MG/1
20 TABLET ORAL DAILY
Status: DISCONTINUED | OUTPATIENT
Start: 2024-07-30 | End: 2024-07-30 | Stop reason: HOSPADM

## 2024-07-30 RX ORDER — PANTOPRAZOLE SODIUM 40 MG/1
40 TABLET, DELAYED RELEASE ORAL
Qty: 14 TABLET | Refills: 0 | DISCHARGE
Start: 2024-07-31

## 2024-07-30 RX ORDER — ASPIRIN 81 MG/1
81 TABLET, CHEWABLE ORAL DAILY
Qty: 30 TABLET | Refills: 0 | DISCHARGE
Start: 2024-07-31

## 2024-07-30 RX ADMIN — FLUCONAZOLE 200 MG: 100 TABLET ORAL at 09:45

## 2024-07-30 RX ADMIN — GABAPENTIN 100 MG: 100 CAPSULE ORAL at 09:45

## 2024-07-30 RX ADMIN — ASPIRIN 81 MG: 81 TABLET, CHEWABLE ORAL at 09:45

## 2024-07-30 RX ADMIN — SODIUM CHLORIDE, PRESERVATIVE FREE 10 ML: 5 INJECTION INTRAVENOUS at 09:46

## 2024-07-30 RX ADMIN — FUROSEMIDE 20 MG: 20 TABLET ORAL at 13:21

## 2024-07-30 RX ADMIN — PANTOPRAZOLE SODIUM 40 MG: 40 TABLET, DELAYED RELEASE ORAL at 06:21

## 2024-07-30 RX ADMIN — APIXABAN 5 MG: 5 TABLET, FILM COATED ORAL at 09:45

## 2024-07-30 RX ADMIN — SODIUM BICARBONATE 1300 MG: 650 TABLET ORAL at 09:45

## 2024-07-30 RX ADMIN — DIGOXIN 125 MCG: 125 TABLET ORAL at 09:45

## 2024-07-30 NOTE — CONSULTS
Department of Psychiatry   Psychiatric Assessment      Thank you very much for allowing us to participate in the care of this patient.      Reason for Consult:  Depression    HISTORY OF PRESENT ILLNESS:      Patient is a 78-year-old female with history of mood disorder admitted for evaluation of shortness of breath dysuria and difficulty swallowing.  Psychiatry is consulted to evaluate for depression.  Patient does report that she has been feeling recently depressed for last 1 month now.  Identifies stressors as ongoing medical issues.  Reports that she has aversion to any food smell and lacks appetite.  Staff mentioned family members expressed concern that she made some comments of suicide and they are concerned that she is not eating food as an intent to kill self.  Discussed this with the patient and patient is adamantly denying this.  Denies any suicidal ideation plan or intent to me.  Mentions that she just does not have any appetite and is having complete aversion to food.  Reports that she does not want to eat and live.  Encouraged her to drink Ensure whenever they are in the room however she was very hesitant about it.  Reports that she has been feeling somewhat helpless and hopeless about her situation for last several weeks now.  Endorses anhedonia.  Reports some trouble falling asleep and staying asleep.    Could not elicit any manic or hypomanic symptoms.  Could not elicit any psychotic symptoms today.    PSYCHIATRIC HISTORY:      Mentions that she has never seen a psychiatrist in the past.  Denies any previous suicide attempts or inpatient psychiatric hospitalizations    [facility] Psychiatric Review of Systems         Obsessions and Compulsions: Denies       Yumiko or Hypomania: Denies     Hallucinations: Denies     Panic Attacks:  Denies     Delusions:  Denies     Phobias:  Denies     Trauma: Denies    Prior to Admission medications    Medication Sig Start Date End Date Taking? Authorizing Provider

## 2024-07-30 NOTE — FLOWSHEET NOTE
1421 Report called to [facility] to Selena FAULKNER     6416 Patients IV removed with no complications. AVS printed and sent with patient. Patients children updated on tranfers. Patient left via transport to [facility].

## 2024-07-30 NOTE — DISCHARGE SUMMARY
[facility]  Office: [phone]  Radames Kramer DO, Demetrio Kumar DO, Martir Christina DO, Hardik Hall DO, Esmer Pierce MD, Maddie Gordon MD, Merari Almaraz MD, Flory Alegria MD,  Oli Valentin MD, Bebeto Mitchell MD, Josue Webster MD,  Davi Harris DO, Joshua Briones MD, Jose Grimm MD, Rocky Kramer DO, Mignon Mccracken MD,  Dajuan Barrera DO, Shirley Wolfe MD, Sophie Mccauley MD, Kathi Alvarez MD, Harriet Ozuna MD,  Hernandez Askew MD, Elizabeth Coates MD, José Cardenas MD, Frankie Stewart MD, Benton Miner MD, Joaquim Benson MD, Andi Leon DO, Blaise Ann DO, Saroj Bang MD,  Rufus Stevenson MD, Shirley Waterhouse, CNP,  Natalya Still CNP, Donis Castro, CNP,  Lakeisha Spear, DNP, Rebecca Alex, CNP, Shu Jean, CNP, Nissa Soto, CNP, Makayla Salguero, CNP, Yvette Lockwood, PA-C, Patt Velarde PA-C, Loretta Douglas, CNP, Kait Shahid, CNP, Jaylyn Paez, CNP, Lisseth Nelson, CNP, Nazia Blanco, CNP, Sonali Garcia, CNS, Janneth Sam, CNP, Zandra Montemayor, CNP, Tracy Schwab, CNP         [facility]   IN-PATIENT SERVICE   [facility]    Discharge Summary     Patient ID: Tita Shrestha  :  1945   MRN: [id number]     ACCOUNT:  [id number]   Patient's PCP: Jake Berrios MD  Admit Date: 2024   Discharge Date: 2024     Length of Stay: 2  Code Status:  DNR-CCA  Admitting Physician: Josue Webster MD  Discharge Physician: Mohammad I Mashaleh, DO     Active Discharge Diagnoses:     Hospital Problem Lists:  Principal Problem:    Chronic cystitis with hematuria  Active Problems:    MRI-safe endovascular aneurysm coil present    Morbid obesity with BMI of 40.0-44.9, adult (HCC)    NSTEMI (non-ST elevated myocardial infarction) (HCC)    Anemia in chronic kidney disease    History of breast cancer    Gastroesophageal reflux disease without esophagitis    Diabetic renal disease (HCC)    COPD (chronic obstructive pulmonary

## 2024-07-30 NOTE — CARE COORDINATION
Case Management Assessment  Initial Evaluation    Date/Time of Evaluation: 7/30/2024 9:26 AM  Assessment Completed by: Shaista Fernandez RN    If patient is discharged prior to next notation, then this note serves as note for discharge by case management.    Patient Name: Tita Shrestha                   YOB: 1945  Diagnosis: NSTEMI (non-ST elevated myocardial infarction) (HCC) [I21.4]  Nausea and vomiting, unspecified vomiting type [R11.2]                   Date / Time: 7/27/2024  8:04 PM    Patient Admission Status: Inpatient   Readmission Risk (Low < 19, Mod (19-27), High > 27): Readmission Risk Score: 17.8    Current PCP: Jake Berrios MD  PCP verified by CM? Yes    Chart Reviewed: Yes      History Provided by: Patient  Patient Orientation: Alert and Oriented    Patient Cognition: Alert    Hospitalization in the last 30 days (Readmission):  No    If yes, Readmission Assessment in  Navigator will be completed.    Advance Directives:      Code Status: DNR-CCA   Patient's Primary Decision Maker is: Legal Next of Kin      Discharge Planning:    Patient lives with: Other (Comment) (SNF- lived with [facility]) Type of Home: Skilled Nursing Facility  Primary Care Giver: Self  Patient Support Systems include: Children, Family Members   Current Financial resources: Medicare  Current community resources: ECF/Home Care  Current services prior to admission: None            Current DME:              Type of Home Care services:  None    ADLS  Prior functional level: Assistance with the following:, Bathing, Dressing, Toileting, Shopping, Mobility, Housework, Cooking  Current functional level: Other (see comment) (Await PT/OT)    PT AM-PAC: 6 /24  OT AM-PAC: 11 /24    Family can provide assistance at DC:    Would you like Case Management to discuss the discharge plan with any other family members/significant others, and if so, who?    Plans to Return to Present Housing:    Other Identified

## 2024-07-30 NOTE — PLAN OF CARE
Problem: Discharge Planning  Goal: Discharge to home or other facility with appropriate resources  7/28/2024 1012 by Luz Carcamo RN  Outcome: Progressing  7/28/2024 0312 by Nazia Hanley, RN  Outcome: Progressing  Flowsheets (Taken 7/28/2024 0307)  Discharge to home or other facility with appropriate resources:   Identify barriers to discharge with patient and caregiver   Identify discharge learning needs (meds, wound care, etc)     Problem: Safety - Adult  Goal: Free from fall injury  7/28/2024 0312 by Nazia Hanley, RN  Outcome: Progressing     Problem: Skin/Tissue Integrity  Goal: Absence of new skin breakdown  Description: 1.  Monitor for areas of redness and/or skin breakdown  2.  Assess vascular access sites hourly  3.  Every 4-6 hours minimum:  Change oxygen saturation probe site  4.  Every 4-6 hours:  If on nasal continuous positive airway pressure, respiratory therapy assess nares and determine need for appliance change or resting period.  7/28/2024 0312 by Nazia Hanley, RN  Outcome: Progressing     
  Problem: Discharge Planning  Goal: Discharge to home or other facility with appropriate resources  Outcome: Progressing  Flowsheets  Taken 7/30/2024 0412  Discharge to home or other facility with appropriate resources:   Identify barriers to discharge with patient and caregiver   Arrange for needed discharge resources and transportation as appropriate   Identify discharge learning needs (meds, wound care, etc)   Refer to discharge planning if patient needs post-hospital services based on physician order or complex needs related to functional status, cognitive ability or social support system  Taken 7/30/2024 0000  Discharge to home or other facility with appropriate resources:   Identify barriers to discharge with patient and caregiver   Arrange for needed discharge resources and transportation as appropriate   Identify discharge learning needs (meds, wound care, etc)   Refer to discharge planning if patient needs post-hospital services based on physician order or complex needs related to functional status, cognitive ability or social support system  Taken 7/29/2024 2145  Discharge to home or other facility with appropriate resources:   Identify barriers to discharge with patient and caregiver   Arrange for needed discharge resources and transportation as appropriate   Identify discharge learning needs (meds, wound care, etc)   Refer to discharge planning if patient needs post-hospital services based on physician order or complex needs related to functional status, cognitive ability or social support system     Problem: Safety - Adult  Goal: Free from fall injury  Outcome: Progressing     Problem: Skin/Tissue Integrity  Goal: Absence of new skin breakdown  Description: 1.  Monitor for areas of redness and/or skin breakdown  2.  Assess vascular access sites hourly  3.  Every 4-6 hours minimum:  Change oxygen saturation probe site  4.  Every 4-6 hours:  If on nasal continuous positive airway pressure, respiratory 
  Problem: Discharge Planning  Goal: Discharge to home or other facility with appropriate resources  Outcome: Progressing  Flowsheets (Taken 7/28/2024 0307)  Discharge to home or other facility with appropriate resources:   Identify barriers to discharge with patient and caregiver   Identify discharge learning needs (meds, wound care, etc)     Problem: Safety - Adult  Goal: Free from fall injury  Outcome: Progressing     Problem: Skin/Tissue Integrity  Goal: Absence of new skin breakdown  Description: 1.  Monitor for areas of redness and/or skin breakdown  2.  Assess vascular access sites hourly  3.  Every 4-6 hours minimum:  Change oxygen saturation probe site  4.  Every 4-6 hours:  If on nasal continuous positive airway pressure, respiratory therapy assess nares and determine need for appliance change or resting period.  Outcome: Progressing     
  Problem: Discharge Planning  Goal: Discharge to home or other facility with appropriate resources  Outcome: Progressing  Flowsheets (Taken 7/28/2024 2000)  Discharge to home or other facility with appropriate resources:   Identify barriers to discharge with patient and caregiver   Arrange for needed discharge resources and transportation as appropriate   Identify discharge learning needs (meds, wound care, etc)   Arrange for interpreters to assist at discharge as needed     Problem: Safety - Adult  Goal: Free from fall injury  Outcome: Progressing     Problem: Skin/Tissue Integrity  Goal: Absence of new skin breakdown  Description: 1.  Monitor for areas of redness and/or skin breakdown  2.  Assess vascular access sites hourly  3.  Every 4-6 hours minimum:  Change oxygen saturation probe site  4.  Every 4-6 hours:  If on nasal continuous positive airway pressure, respiratory therapy assess nares and determine need for appliance change or resting period.  Outcome: Progressing     Problem: ABCDS Injury Assessment  Goal: Absence of physical injury  Outcome: Progressing     Problem: Chronic Conditions and Co-morbidities  Goal: Patient's chronic conditions and co-morbidity symptoms are monitored and maintained or improved  Outcome: Progressing  Flowsheets (Taken 7/28/2024 2000)  Care Plan - Patient's Chronic Conditions and Co-Morbidity Symptoms are Monitored and Maintained or Improved:   Monitor and assess patient's chronic conditions and comorbid symptoms for stability, deterioration, or improvement   Collaborate with multidisciplinary team to address chronic and comorbid conditions and prevent exacerbation or deterioration   Update acute care plan with appropriate goals if chronic or comorbid symptoms are exacerbated and prevent overall improvement and discharge     
[facility]  Office: [phone]  Radames Kramer DO, Demetrio Kumar, DO, Martir Christina DO, Hardik Hall DO, Esmer Pierce MD, Madide Gordon MD, Merari Almaraz MD, Flory Alegria MD,  Oli Valentin MD, Bebeto Mitchell MD, Josue Webster MD,  Davi Harris DO, Joshua Briones MD, Jose Grimm MD, Rocky Kramer DO, Mignon Mccracken MD,  Dajuan Barrera DO, Shirley Wolfe MD, Sophie Mccauley MD, Kathi Alvarez MD, Harriet Ozuna MD,  Hernandez Askew MD, Elizabeth Coates MD, José Cardenas MD, Frankie Stewart MD, Benton Miner MD, Joaquim Benson MD, Andi Leon DO, Blaise Ann DO, Saroj Bang MD,  Rufus Stevenson MD, Shirley Waterhouse, CNP,  Natalya Still CNP, Donis Castro, CNP,  Lakeisha Spear, DNP, Rebecca Alex, CNP, Shu Jean, CNP, Nissa Soto, CNP, Makayla Salguero, CNP, Yvette Lockwood, PA-C, Patt Velarde PA-C, Loretta Douglas, CNP, Kait Shahid, CNP, Jaylyn Paez, CNP, Lisseth Nelson, CNP, Nazia Blanco, CNP, Sonali Garcia, CNS, Janneth Sam, CNP, Zandra Montemayor, CNP, Tracy Schwab, CNP         [facility]   IN-PATIENT SERVICE   [facility]    Second Visit Note  For more detailed information please refer to the progress note of the day      7/28/2024    12:11 PM    Name:   Tita Shrestha  MRN:     [id number]     Acct:      [id number]   Room:   321/321-01  IP Day:  0  Admit Date:  7/27/2024  8:04 PM    PCP:   Jake Berrios MD  Code Status:  DNR-CCA        Pt vitals were reviewed   New labs were reviewed   Patient was seen    Updated plan :     Met with family and patient. Family concerned that she has been starving herself in an attempt to end her life  Confronted patient about depression. Agrees she may be feeling down. Denies any suicidal ideation and also denies any plan to harm her self. Denies trying to starve herself at the nursing home  Discussed about food intake. States taht only water is able to pass and that all other materials get 
Physical Therapy    Visit Type: treatment  SUBJECTIVE  Patient agreed to participate in therapy this date.  Patient verbally agrees to allow the following to be present during session: student  \"I stood up with the walker earlier.\"   Patient / Family Goal: maximize function    Pain     Location: incisional, did not rate     OBJECTIVE     Cognitive Status   Orientation    - Oriented to: person, place, time and situation  Functional Communication   - Overall Status: within functional limits   - Forms of Communication: verbal  Attention Span    - Attention: intact  Following Direction   - follows one step commands consistently  Safety Awareness/Insight   - good awareness of safety precautions  Awareness of Deficits   - fully aware of deficits      Sitting Balance  (TRISTON = base of support)  Static      - Trial 1 details: with back support, independent and with double UE support       Bed Mobility  Received pt in bedside recliner at start of session.   Transfers  - Sit to stand: patient refused  Pt refusing STS attempt 2' incisional pain and fatigue due to recent OT session.    Ambulation / Gait  : Pt refused to ambulate with physical therapy d/t complaints of feeling tired.        Interventions     Supine    Lower Extremity: Bilateral: ankle pumps, heel slides, hip abduction and hip adduction (Semi-dodge's position), AROM and AAROM, 1 sets  Seated    Lower Extremity: Bilateral: seated hip flexion, knee flexion and knee extensions, AROM, 10 reps, 1 sets    Upper Extremity: Bilateral: elbow flexion, elbow extension, digit flexion and digit extension, AROM, 10 reps, 1 sets  Training provided: safety training, HEP training, neuromuscular reeducation, energy conservation, compensatory techniques, activity tolerance, balance retraining, body mechanics and positioning    Skilled input: As detailed above  Verbal Consent: Writer verbally educated and received verbal consent for hand placement, positioning of patient, and 
goals if chronic or comorbid symptoms are exacerbated and prevent overall improvement and discharge     
techniques to be performed today from patient for clothing adjustments for techniques, hand placement and palpation for techniques and therapist position for techniques as described above and how they are pertinent to the patient's plan of care.         Education:   - Present and ready to learn: patient  Education provided during session:  - Results of above outlined education: Verbalizes understanding and Demonstrates understanding    ASSESSMENT   Progress: progressing toward goals  Interferring components: decreased activity tolerance, medical precautions, medical status limitations and surgical precautions    Summary of function and discharge needs based on today's assessment:   - Current level of function: slightly below baseline level of function   - Therapy needs at discharge: therapy 1-3 times per week   - Activities of daily living (ADLs) requiring support at discharge: bed mobility, transfers, ambulation and stairs   - Impairments that require further therapy intervention: activity tolerance, balance, strength and coordination    AM-PAC  - Generalized Prior Level of Function: IND/MOD I ([facility] 22-24)       Key: MOD A=moderate assistance, IND/MOD I=independent/modified independent  - Generalized Current Level of Function     - Current Mobility Score: 16       AM-PAC Scoring Key= >21 Modified Independent; 20-21 Supervision; 18-19 Minimal assist; 13-18 Moderate assist; 9-12 Max assist; <9 Total assist        PLAN   Suggestions for next session as indicated: PT Frequency: 3-5 x per week      PT/OT Mobility Equipment for Discharge: Pt owns RW and Wheelchair  PT/OT ADL Equipment for Discharge: none  Agreement to plan and goals: patient agrees with goals and treatment plan        GOALS  Review Date: 7/20/2023  Long Term Goals: (to be met by time of discharge from hospital)  Sit to supine: Patient will complete sit to supine stand by assist.  Status: progressing/ongoing  Sit to stand: Patient will complete sit to 
improvement  Taken 7/30/2024 0000  Care Plan - Patient's Chronic Conditions and Co-Morbidity Symptoms are Monitored and Maintained or Improved:   Monitor and assess patient's chronic conditions and comorbid symptoms for stability, deterioration, or improvement   Collaborate with multidisciplinary team to address chronic and comorbid conditions and prevent exacerbation or deterioration  Taken 7/29/2024 2145  Care Plan - Patient's Chronic Conditions and Co-Morbidity Symptoms are Monitored and Maintained or Improved: Monitor and assess patient's chronic conditions and comorbid symptoms for stability, deterioration, or improvement     Problem: Pain  Goal: Verbalizes/displays adequate comfort level or baseline comfort level  7/30/2024 1311 by Maxine Santo, RN  Outcome: Completed  7/30/2024 0628 by Wen Hastings, RN  Outcome: Progressing  Flowsheets (Taken 7/30/2024 0412)  Verbalizes/displays adequate comfort level or baseline comfort level:   Encourage patient to monitor pain and request assistance   Assess pain using appropriate pain scale   Administer analgesics based on type and severity of pain and evaluate response   Implement non-pharmacological measures as appropriate and evaluate response   Notify Licensed Independent Practitioner if interventions unsuccessful or patient reports new pain     
stand transfer with 2-wheeled walker, stand by assist and following weight bearing status.   Status: progressing/ongoing  Stand to sit: Patient will complete stand to sit transfer with 2-wheeled walker, stand by assist and following weight bearing status.   Status: progressing/ongoing  Stand pivot: Patient will complete stand pivot transfer with 2-wheeled walker, following weight bearing status.   Status: progressing/ongoing  Ambulation (even): Patient will ambulate on even surface for 50 feet with least restrictive device, stand by assist.     Documented in the chart in the following areas: Assessment/Plan.      Patient at End of Session:   Location: in chair  Safety measures: alarm system in place/re-engaged, call light within reach, equipment intact and lines intact  Handoff to: nurse      I was in the immediate presence of the student and directed the student’s performance of the services. I am responsible for all treatment, assessment, documentation, and billing rendered for this patient.  Jessie Minaya, PTA        Therapy procedure time and total treatment time can be found documented on the Time Entry flowsheet  
to address chronic and comorbid conditions and prevent exacerbation or deterioration  Taken 7/29/2024 8535  Care Plan - Patient's Chronic Conditions and Co-Morbidity Symptoms are Monitored and Maintained or Improved: Monitor and assess patient's chronic conditions and comorbid symptoms for stability, deterioration, or improvement     Problem: Pain  Goal: Verbalizes/displays adequate comfort level or baseline comfort level  7/30/2024 0628 by Wen Hastings, RN  Outcome: Progressing  Flowsheets (Taken 7/30/2024 1270)  Verbalizes/displays adequate comfort level or baseline comfort level:   Encourage patient to monitor pain and request assistance   Assess pain using appropriate pain scale   Administer analgesics based on type and severity of pain and evaluate response   Implement non-pharmacological measures as appropriate and evaluate response   Notify Licensed Independent Practitioner if interventions unsuccessful or patient reports new pain

## 2024-07-30 NOTE — CARE COORDINATION
Discharge orders placed in Epic. 4pm transportation request faxed to [facility]. CM called and left message requesting call back for [facility] admissions.    1400- Return call received from [place] with [facility] confirming they are able to accept patient back with 4pm transportation. CM spoke with Lisseth from [facility] and confirmed 4p transportation time. Patient updated and agreeable with plan.    Discharge Report    [facility]  Clinical Case Management Department  Written by: Shaista Fernandez RN    Patient Name: Tita Shrestha  Attending Provider: Davi Harris DO  Admit Date: 2024  8:04 PM  MRN: [id number]  Account: [id number]                     : 1945  Discharge Date: 24      Disposition: SNF- [facility]    Shaista Fernandez RN

## 2024-07-30 NOTE — DISCHARGE INSTR - COC
Continuity of Care Form    Patient Name: Tita Shrestha   :  1945  MRN:  [id number]    Admit date:  2024  Discharge date:  ***    Code Status Order: DNR-CCA   Advance Directives:     Admitting Physician:  Josue Webster MD  PCP: Jake Berrios MD    Discharging Nurse: ***  Discharging Hospital Unit/Room#: 321/321-01  Discharging Unit Phone Number: ***    Emergency Contact:   Extended Emergency Contact Information  Primary Emergency Contact: Veronica Shrestha  Home Phone: [phone]  Relation: Child  Secondary Emergency Contact: Cristóbal Shrestha   [facility]  Home Phone: [phone]  Work Phone: [phone]  Mobile Phone: [phone]  Relation: Child    Past Surgical History:  Past Surgical History:   Procedure Laterality Date    BREAST LUMPECTOMY Left     Chemo & Radiation    COLONOSCOPY N/A 2019    COLONOSCOPY POLYPECTOMY HOT BIOPSY performed by Russ Barton MD at [facility] OR    HYSTERECTOMY (CERVIX STATUS UNKNOWN)      TOTAL    OTHER SURGICAL HISTORY  2017    CEREBRA EMBO COILING WITH STENT    OTHER SURGICAL HISTORY Left 2017    coil embolization       Immunization History:   Immunization History   Administered Date(s) Administered    Influenza Vaccine, unspecified formulation 2017    Pneumococcal, PPSV23, PNEUMOVAX 23, (age 2y+), SC/IM, 0.5mL 2014       Active Problems:  Patient Active Problem List   Diagnosis Code    DDD (degenerative disc disease), lumbar M51.36    Lumbar stenosis with neurogenic claudication M48.062    Lumbago M54.50    Lumbar radiculopathy, chronic M54.16    Cerebral aneurysm without rupture I67.1    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

## 2024-07-30 NOTE — DISCHARGE INSTRUCTIONS
-Make an with your primary care physician within 1 week of discharge for evaluation of your symptoms  -Continue to encourage oral intake.  -PT OT as tolerated  -Repeat labs in 1 week  -Make an appointment with pulmonology for evaluation of pulmonary hypertension, sleep study

## 2024-07-30 NOTE — CARE COORDINATION
Rec'd call from [place] Shane [facility] patient can return when medically cleared.

## 2024-08-01 NOTE — PROGRESS NOTES
Physician Progress Note      PATIENT:               CONNOR KIRK  [facility] #:                  [id number]  :                       1945  ADMIT DATE:       2024 8:04 PM  DISCH DATE:        2024 4:57 PM  RESPONDING  PROVIDER #:        Josue Webster MD          QUERY TEXT:    Patient admitted  on  with 4 days of nausea and vomiting Acute UTI, noted   to have chronic persistent atrial fibrillation and is maintained on Eliquis.   co morbid condition of age of 78, female chronic CHF, HTN . If possible,   please document in progress notes and discharge summary if you are evaluating   and/or treating any of the following:?  ?  The medical record reflects the following:  Risk Factors: Female age of 78, chronic CHF, HTN  Clinical Indicators: chronic persistent A fib  Treatment: EKG, Eliquis,      Thank You Sergey FAULKNER BSN CCDS  Email [email or web address]  office hours M-F 6am to 2:30p  Options provided:  -- Secondary hypercoagulable state in a patient with atrial fibrillation  -- Other - I will add my own diagnosis  -- Disagree - Not applicable / Not valid  -- Disagree - Clinically unable to determine / Unknown  -- Refer to Clinical Documentation Reviewer    PROVIDER RESPONSE TEXT:    This patient has secondary hypercoagulable state in a patient with atrial   fibrillation.    Query created by: Hannah Mcgrath on 2024 1:22 PM      Electronically signed by:  Josue eWbster MD 2024 3:27 PM          
  Physician Progress Note      PATIENT:               CONNOR KIRK  [facility] #:                  [id number]  :                       1945  ADMIT DATE:       2024 8:04 PM  DISCH DATE:        2024 4:57 PM  RESPONDING  PROVIDER #:        Davi Harris DO          QUERY TEXT:    Patient admitted on   with a 4 day  history of nausea and vomiting. Noted   documentation of NSTEMI in H&P on  and in discharge summary on   with   noted cardiology consult noting elevated high sensitivity troponin flat x 3   likely patients baseline with obesity, chronic a fib, diastolic heart failure   and nstemi likely type 2 m . noted denies chest pain per cardiology notes .   EKG on admission shows A fib. lab notes Troponin 75>74>73>72. Please clarify   one of the following:    The medical record reflects the following:  Risk Factors: age of 78 A fib DM, CKD, UTI  Clinical Indicators: admitted on   with a 4 day  history of nausea and   vomiting. Noted documentation of NSTEMI in H&P on  with noted cardiology   consult noting elevated high sensitivity troponin flat x 3 likely patients   baseline with obesity, chronic a fib, diastolic heart failure. noted denies   chest pain per cardiology notes . EKG on admission shows A fib. lab notes   Troponin 75>74>73>72.  Treatment: EKG Troponin monitoring, Cardiology consult    Thank You Sergey FAULKNER BSN CCDS  Email [email or web address]  office hours M-F 6am to 2:30p  Options provided:  -- NSTEMI  confirmed and Type 2 MI  ruled out  -- type 2 MI  confirmed and NSTEMI ruled out  -- NSTEMI and type 2 MI  ruled out  -- Other - I will add my own diagnosis  -- Disagree - Not applicable / Not valid  -- Disagree - Clinically unable to determine / Unknown  -- Refer to Clinical Documentation Reviewer    PROVIDER RESPONSE TEXT:    After study,Type 2 MI confirmed and nstemi ruled out.    Query created by: Hannah Mcgrath on 2024 8:49 AM      Electronically signed by: 
 Cardiology continuing to follow.      Patient off floor today:  Transferred to [facility] for swallow study.      Patient remains in hospital for UTI.    Cardiology following for chronic persistent atrial fibrillation.    Also has known history of chronic kidney disease, diabetes type 2, obesity, depression.      Echocardiogram to be completed today.      Patient's vital signs have been well controlled.    Afib remains rate controlled.    No significant tachycardia /bradycardia.      Further recommendations from cardiovascular standpoint after echocardiogram completed.    If no significant changes then will follow from afar.      Patient should follow up with primary cardiologist upon discharge.    
[facility]  Office: [phone]  Radames Kramer DO, Demetrio Kumar, DO, Martir Christina DO, Hardik Hall, DO, Esmer Pierce MD, Maddie Gordon MD, Merari Almaraz MD, Flory Alegria MD,  Oli Valentin MD, Bebeto Mitchell MD, Josue Webster MD,  Davi Harris DO, Joshua Briones MD, Jose Grimm MD, Rocky Kramer DO, Mignon Mccracken MD,  Dajuan Barrera DO, Shirley Wolfe MD, Sophie Mccauley MD, Kathi Alvarez MD, Harriet Ozuna MD,  Hernandez Askew MD, Elizabeth Coates MD, José Cardenas MD, Frankie Stewart MD, Benton Miner MD, Joaquim Benson MD, Andi Leon DO, Blaise Ann DO, Saroj Bang MD,  Rufsu Stevenson MD, Shirley Waterhouse, CNP,  Natalya Still, CNP, Donis Castro, CNP,  Lakeisha Spear, DNP, Rebecca Alex, CNP, Shu Jean, CNP, Nissa Soto, CNP, Makayla Salguero, CNP, Yvette Lockwood, PA-C, Patt Velarde, PA-C, Loretta Douglas, CNP, Kait Shahid, CNP, Jaylyn Paez, CNP, Lisseth Nelson, CNP, Nazia Blanco, CNP, Sonali Garcia, CNS, Janneth Sam, CNP, Zandra Montemayor, CNP, Tracy Schwab, CNP         [facility]   IN-PATIENT SERVICE   [facility]    Progress Note    7/29/2024    9:13 AM    Name:   Tita Shrestha  MRN:     [id number]     Acct:      [id number]   Room:   321/321-01   Day:  1  Admit Date:  7/27/2024  8:04 PM    PCP:   Jake Berrios MD  Code Status:  DNR-CCA    Subjective:     C/C:   Chief Complaint   Patient presents with    Emesis     Intermit emesis for the past 4 days, phenergan IV admin PTA      Interval History Status: not changed.     Patient seen and examined. Didn't eat well yesterday, she toled me she didn't get ONS either. Still concerned about swallowing. Plan for swallow study later today. Did not complain about sleep. She did get 1 dose of remeron last night.     Brief History:     78 year old female with past medical history of afib, breast cancer with left lumpectomy, HLD< HTN, gout, obesity, COPD presents with shortness 
Occupational Therapy  Facility/Department: [address]  Occupational Therapy Initial Assessment    Name: Tita Shrestha  : 1945  MRN: [id number]  Date of Service: 2024    Chief Complaint   Patient presents with    Emesis     Intermit emesis for the past 4 days, phenergan IV admin PTA      Discharge Recommendations:  Patient would benefit from continued therapy after discharge        Patient Diagnosis(es): The primary encounter diagnosis was NSTEMI (non-ST elevated myocardial infarction) ([facility]). Diagnoses of Nausea and vomiting, unspecified vomiting type, Chest pain, unspecified type, and Shortness of breath were also pertinent to this visit.  Past Medical History:  has a past medical history of A-fib (HCC), Aneurysm, cerebral, Arthritis, Breast cancer (HCC), Diabetes mellitus (HCC), H/O transfusion, Hyperlipidemia, Hypertension, Porphyria (HCC), and Wears glasses.  Past Surgical History:  has a past surgical history that includes Hysterectomy (); Breast lumpectomy (Left, ); other surgical history (2017); other surgical history (Left, 2017); and Colonoscopy (N/A, 2019).       Assessment   Performance deficits / Impairments: Decreased functional mobility ;Decreased ADL status;Decreased safe awareness;Decreased cognition;Decreased balance;Decreased endurance;Decreased strength;Decreased fine motor control;Decreased coordination  Assessment: Pt presents with decreased ADL status secondary to above noted deficits requiring max A x2 for bed mobility, mod Ax2 for functional transfers using hayden stedy, and total A for LB ADL/toileting tasks. Pt significantly limited  Pt to benefit from continued therapy services while hospitalized to maximize pt's safety and independence in performing functional tasks. At this time, pt has not demonstrated the functional ability to safely return to prior living arrangements, continued skilled OT intervention recommended prior to an eventual return to 
Physical Therapy  Facility/Department: [address]  Physical Therapy Initial Assessment    Name: Tita Shrestha  : 1945  MRN: [id number]  Date of Service: 2024  Chief Complaint   Patient presents with    Emesis     Intermit emesis for the past 4 days, phenergan IV admin PTA          Discharge Recommendations:  Patient would benefit from continued therapy after discharge   PT Equipment Recommendations  Other: to be determined at [facility]      Patient Diagnosis(es): The primary encounter diagnosis was NSTEMI (non-ST elevated myocardial infarction) ([facility]). Diagnoses of Nausea and vomiting, unspecified vomiting type, Chest pain, unspecified type, and Shortness of breath were also pertinent to this visit.  Past Medical History:  has a past medical history of A-fib (HCC), Aneurysm, cerebral, Arthritis, Breast cancer (HCC), Diabetes mellitus (HCC), H/O transfusion, Hyperlipidemia, Hypertension, Porphyria (HCC), and Wears glasses.  Past Surgical History:  has a past surgical history that includes Hysterectomy (); Breast lumpectomy (Left, ); other surgical history (2017); other surgical history (Left, 2017); and Colonoscopy (N/A, 2019).    Assessment   Body Structures, Functions, Activity Limitations Requiring Skilled Therapeutic Intervention: Decreased functional mobility ;Decreased balance;Decreased endurance;Decreased cognition;Decreased safe awareness;Decreased strength;Decreased posture;Decreased ROM;Decreased ADL status  Assessment: Pt normally lives w/ granddaughter in a condo and stays on main level and was independent w/ rollator for gait. Pt was at [facility] when she had 4 days of emesis. At PT evaluation, pt required max Ax2 for hospital bed mobility and mod Ax2 sit to/from stand w/ Fatou Stedy but bed elevated to maximal level due to pt LE weakness severity L LE > R LE. Pt not safe to return to prior living arrangements. She will benefit from acute PT and PT at discharge 
Physical Therapy  Facility/Department: [address]  Physical Therapy Daily Progress Note    Name: Tita Shrestha  : 1945  MRN: [id number]  Date of Service: 2024    Discharge Recommendations:  Patient would benefit from continued therapy after discharge   PT Equipment Recommendations  Other: to be determined at SNF      Patient Diagnosis(es): The primary encounter diagnosis was NSTEMI (non-ST elevated myocardial infarction) ([facility]). Diagnoses of Nausea and vomiting, unspecified vomiting type, Chest pain, unspecified type, Shortness of breath, and Simple chronic bronchitis ([facility]) were also pertinent to this visit.  Past Medical History:  has a past medical history of A-fib (HCC), Aneurysm, cerebral, Arthritis, Breast cancer (HCC), Diabetes mellitus (HCC), H/O transfusion, Hyperlipidemia, Hypertension, Porphyria (HCC), and Wears glasses.  Past Surgical History:  has a past surgical history that includes Hysterectomy (); Breast lumpectomy (Left, ); other surgical history (2017); other surgical history (Left, 2017); and Colonoscopy (N/A, 2019).    Assessment   Body Structures, Functions, Activity Limitations Requiring Skilled Therapeutic Intervention: Decreased functional mobility ;Decreased balance;Decreased endurance;Decreased cognition;Decreased safe awareness;Decreased strength;Decreased posture;Decreased ROM;Decreased ADL status  Assessment: Pt normally lives w/ granddaughter in a condo and stays on main level and was independent w/ rollator for gait. Pt was at [facility] when she had 4 days of emesis. Pt required Max assist x 2 bed mobility and Max assist x 2 for STS in Fatou Stedy from bed and toilet; pt unable to ambulate at this time. Pt not safe to return to prior living arrangements. She will benefit from acute PT and PT at discharge to increase trunk/LE AROM/strength and balance, transfer, and gait training progression.  Therapy Prognosis: Fair  Requires PT 
SPIRITUAL CARE DEPARTMENT [facility]  PROGRESS NOTE    Room # 321/321-01   Name: Tita Shrestha            Holiness: unknown    Reason for visit: Routine    I visited the patient.    Admit Date & Time: 7/27/2024  8:04 PM    Assessment:  Tita Shrestha is a 78 y.o. female in the hospital because chronic cystitis. Upon entering the room Pt was open to conversation. Pt  was friendly and quiet during visit.      Intervention:  I introduced myself and my title as  I offered space for Pt  to express feelings, needs, and concerns and provided a ministry presence.  provided support and caring during visit.Provided encouragement and empathic listening during time together.    Outcome:  Pt expressed family support and importance of support from others.    Plan:   will follow up by being available to Pt.    Electronically signed by Chaplain REGI, on 7/29/2024 at 6:49 PM.  Spiritual Care Department  [facility]      07/29/24 4577   Encounter Summary   Encounter Overview/Reason Initial Encounter;Spiritual/Emotional Needs   Service Provided For Patient   Referral/Consult From Patient;Rounding   Support System Family members   Last Encounter  07/29/24   Complexity of Encounter Moderate   Begin Time 1700   End Time  1710   Total Time Calculated 10 min   Spiritual/Emotional needs   Type Spiritual Support   Grief, Loss, and Adjustments   Type Life Adjustments;Adjustment to illness   Assessment/Intervention/Outcome   Assessment Calm;Coping   Intervention Active listening;Discussed meaning/purpose;Nurtured Hope;Sustaining Presence/Ministry of presence   Outcome Coping;Encouraged   Plan and Referrals   Plan/Referrals Continue to visit, (comment)       
Speech Language Pathology  [facility]  Dysphagia Treatment Note    Diet Recommendations: regular/thins     Date: 7/29/2024  Patient’s Name: Tita Shrestha  MRN: [id number]  Diagnosis: NSTEMI (non-ST elevated myocardial infarction) (HCC) [I21.4]  Nausea and vomiting, unspecified vomiting type [R11.2]    Pain: denies      Dysphagia Treatment  Treatment time:9405-6205    Current Diet:     Subjective: [x] Alert [x] Cooperative     [] Confused     [] Agitated    [] Lethargic    Objective/Assessment:    Goal: Pt will tolerate LRD without overt s/s of aspiration.  Outcome: Ongoing  Tx: Discussed MBS results from this morning with pt. ST provided education re safe swallow strategies (sitting upright for all meals, one bite/sip at a time, alternating liquids/solids, small bites/sips, etc). Pt verbalized understanding and is receptive to education.     Consulting SLP reports no aspiration/penetration on MBS. Pt reports she is tolerating oral diet well.     Assessment: [x] Progressing toward goals.    [] No change.     [] Other:    Plan:  [x] Continue ST services at current frequency toward long/short term goals   [] Discharge from ST:      Discharge recommendations: []  Further therapy recommended at discharge.The patient should be able to tolerate at least 3 hours of therapy per day over 5 days or 15 hours over 7 days. [] Further therapy recommended at discharge.   [x] No therapy recommended at discharge.     Zaynab Toscano CCC-SLP   Speech-Language Pathologist      
increased comfort.  General Comment  Comments: RN OK for TX. Patient requesting to use bathroom.    Objective            Activities of Daily Living  Feeding: Setup  LE Dressing: Dependent/Total  LE Dressing Skilled Clinical Factors: slipper socks  Toileting: Dependent/Total  Toileting Skilled Clinical Factors: hygiene and brief change, incontinence of bowel/bladder    Balance  Balance  Sitting: Impaired (static/dynamic-MIN-MOD (increasing assist with fatigue noted))  Standing: Impaired (static-MOD-MAX x2)    Transfers/Mobility  Bed mobility  Supine to Sit: 2 Person assistance;Maximum assistance  Sit to Supine: 2 Person assistance;Maximum assistance  Scooting: Maximal assistance;2 Person assistance  Bed Mobility Comments: HOB partially evelated for supine>sit and use of rail    Transfers  Sit to stand: 2 Person assistance;Maximum assistance  Stand to sit: 2 Person assistance;Maximum assistance  Transfer Comments: from bed and toilet, patient able to stand from Fatou Stedy paddles with MIN x2    Toilet Transfers  Toilet - Technique:  (Fatou Stedy)  Equipment Used: Standard toilet (with grab bars)  Toilet Transfer: 2 Person assistance;Maximum assistance    Functional Mobility: Dependent/Total;Maximum assistance  Functional Mobility Skilled Clinical Factors: bed mob MAX x2, sit<>stand MAX x2, Fatou Stedy dep bed<>toilet    Patient Education  Patient Education  Education Given To: Patient  Education Provided: Role of Therapy;Fall Prevention Strategies;Equipment;ADL Adaptive Strategies;Transfer Training  Education Provided Comments: safety, activity engagement/promotion  Education Method: Demonstration;Verbal  Barriers to Learning: Cognition  Education Outcome: Verbalized understanding;Continued education needed    Goals  Patient Goals   Patient goals : see my family  Short Term Goals  Time Frame for Short Term Goals: 14 visits  Short Term Goal 1: Pt will perform grooming/UB ADL tasks with min A using AE/DME PRN  Short Term 
98.1 °F (36.7 °C) (Oral)   Resp 17   Ht 1.728 m (5' 8.03\")   Wt 111.4 kg (245 lb 9.5 oz)   LMP 2011 (Approximate)   SpO2 96%   BMI 37.31 kg/m²   Temp (24hrs), Av.7 °F (37.1 °C), Min:98.1 °F (36.7 °C), Max:100.4 °F (38 °C)    Recent Labs     24  1623 24  20424  0739 24  1126   POCGLU 91 88 87 86       I/O (24Hr):    Intake/Output Summary (Last 24 hours) at 2024 1253  Last data filed at 2024 2345  Gross per 24 hour   Intake 536.3 ml   Output 200 ml   Net 336.3 ml       Labs:  Hematology:  Recent Labs     24  03524  0614   WBC 6.3 6.0 5.0   RBC 3.53* 3.59* 3.37*   HGB 9.7* 9.8* 9.2*   HCT 30.4* 31.1* 28.9*   MCV 86.2 86.6 85.8   MCH 27.4 27.2 27.3   MCHC 31.8 31.5 31.9   RDW 22.4* 22.5* 22.9*    218 213   MPV 8.4 7.6 8.6   INR 1.4  --  1.5     Chemistry:  Recent Labs     24  0828 24  0614 24  0706   *  --   --   --  131* 130* 132*   K 5.0  --   --   --  4.8 4.4 4.4   CL 95*  --   --   --  98 96* 98   CO2 21  --   --   --   24 21   GLUCOSE 81  --   --   --  78 98 90   BUN 11  --   --   --  10 10 9   CREATININE 1.1*  --   --   --  1.2* 1.2* 1.1*   MG  --   --   --  1.5*  --   --   --    ANIONGAP 15  --   --   --  11 10 13   LABGLOM 52*  --   --   --  47* 47* 52*   CALCIUM 8.3*  --   --   --  8.3* 7.9* 7.7*   PROBNP 2,077*  --   --   --   --   --   --    TROPHS 75* 74* 73* 72*  --   --   --    DIGOXIN 0.7*  --   --   --  0.6*  --   --      Recent Labs     24  03524  1135 24  0719 24  1623 24  0739 24  1126   AST 17  --   --   --   --   --   --   --   --    ALT 5  --   --   --   --   --   --   --   --    ALKPHOS 96  --   --   --   --   --   --   --   --    BILITOT 0.7  --   --   --   --   --   --   --   --    LIPASE 18  --   --   --   --   --   --   --   --    CHOL  --

## 2024-08-03 ENCOUNTER — HOSPITAL ENCOUNTER (EMERGENCY)
Age: 79
Discharge: HOME | End: 2024-08-03
Attending: EMERGENCY MEDICINE
Payer: MEDICARE

## 2024-08-03 VITALS
TEMPERATURE: 98.6 F | WEIGHT: 235 LBS | SYSTOLIC BLOOD PRESSURE: 91 MMHG | OXYGEN SATURATION: 94 % | DIASTOLIC BLOOD PRESSURE: 79 MMHG | RESPIRATION RATE: 18 BRPM | HEIGHT: 68 IN | BODY MASS INDEX: 35.61 KG/M2 | HEART RATE: 100 BPM

## 2024-08-03 DIAGNOSIS — F32.A DEPRESSION, UNSPECIFIED DEPRESSION TYPE: Primary | ICD-10-CM

## 2024-08-03 PROCEDURE — 99283 EMERGENCY DEPT VISIT LOW MDM: CPT

## 2024-08-03 ASSESSMENT — PAIN - FUNCTIONAL ASSESSMENT: PAIN_FUNCTIONAL_ASSESSMENT: NONE - DENIES PAIN

## 2024-08-03 NOTE — ED PROVIDER NOTES
[facility] Emergency Department  [address]  [place] [address]  Phone: [phone]  Fax: [phone]      Pt Name: Tita Shrestha  MRN:[id number]  Birthdate 1945  Date of evaluation: 8/3/2024      CHIEF COMPLAINT       Chief Complaint   Patient presents with    Other     Pt from nursing home.  sent her for evaluation for sometimes having suicidal thoughts. Pt does not have any plan.         HISTORY OF PRESENT ILLNESS   78-year-old female presents to the emergency department today from the nursing home after she made a statement that she is ready to die.  She denies being homicidal or suicidal.  She just reports that she is ready to die.  She been feeling this way for some time recently.  She admits that she is depressed.  She recently was placed in nursing home for rehabilitation after bilateral leg weakness.  She has been declining to participate in physical therapy.  She has not been eating or drinking.  She states that she just cannot eat or drink.  She was recently admitted at [facility] where they did do a swallow study which was normal on her.  The speech therapist at the nursing home told the family members it looks like she is forcefully trying to vomit.  Family is concerned that she may be trying to \"starve herself to death.\"    REVIEWOF SYSTEMS     Review of Systems   All other systems reviewed and are negative.        PAST MEDICAL HISTORY    has a past medical history of A-fib (HCC), Aneurysm, cerebral, Arthritis, Breast cancer (HCC), Diabetes mellitus (HCC), H/O transfusion, Hyperlipidemia, Hypertension, Porphyria (HCC), and Wears glasses.    SURGICAL HISTORY      has a past surgical history that includes Hysterectomy (1989); Breast lumpectomy (Left, 2011); other surgical history (06/2017); other surgical history (Left, 12/21/2017); and Colonoscopy (N/A, 5/24/2019).    CURRENTMEDICATIONS       Current Discharge Medication List        CONTINUE these

## 2024-08-06 ENCOUNTER — HOSPITAL ENCOUNTER (OUTPATIENT)
Age: 79
Setting detail: SPECIMEN
Discharge: HOME | End: 2024-08-06

## 2024-08-06 LAB
ALBUMIN SERPL-MCNC: 2.5 G/DL (ref 3.5–5.2)
ALP SERPL-CCNC: 85 U/L (ref 35–104)
ALT SERPL-CCNC: <5 U/L (ref 5–33)
ANION GAP SERPL CALCULATED.3IONS-SCNC: 11 MMOL/L (ref 9–17)
AST SERPL-CCNC: 14 U/L
BILIRUB SERPL-MCNC: 0.9 MG/DL (ref 0.3–1.2)
BUN SERPL-MCNC: 17 MG/DL (ref 8–23)
BUN/CREAT SERPL: 10 (ref 9–20)
CALCIUM SERPL-MCNC: 7.7 MG/DL (ref 8.6–10.4)
CHLORIDE SERPL-SCNC: 98 MMOL/L (ref 98–107)
CO2 SERPL-SCNC: 24 MMOL/L (ref 20–31)
CREAT SERPL-MCNC: 1.7 MG/DL (ref 0.5–0.9)
ERYTHROCYTE [DISTWIDTH] IN BLOOD BY AUTOMATED COUNT: 20.2 % (ref 11.8–14.4)
EST. AVERAGE GLUCOSE BLD GHB EST-MCNC: 77 MG/DL
GFR, ESTIMATED: 31 ML/MIN/1.73M2
GLUCOSE SERPL-MCNC: 92 MG/DL (ref 70–99)
HBA1C MFR BLD: 4.3 % (ref 4–6)
HCT VFR BLD AUTO: 30.9 % (ref 36.3–47.1)
HGB BLD-MCNC: 9.3 G/DL (ref 11.9–15.1)
MCH RBC QN AUTO: 27.8 PG (ref 25.2–33.5)
MCHC RBC AUTO-ENTMCNC: 30.1 G/DL (ref 28.4–34.8)
MCV RBC AUTO: 92.2 FL (ref 82.6–102.9)
NRBC BLD-RTO: 0 PER 100 WBC
PLATELET # BLD AUTO: 184 K/UL (ref 138–453)
PMV BLD AUTO: 11.5 FL (ref 8.1–13.5)
POTASSIUM SERPL-SCNC: 5.2 MMOL/L (ref 3.7–5.3)
PROT SERPL-MCNC: 5.2 G/DL (ref 6.4–8.3)
RBC # BLD AUTO: 3.35 M/UL (ref 3.95–5.11)
SODIUM SERPL-SCNC: 133 MMOL/L (ref 135–144)
TSH SERPL DL<=0.05 MIU/L-ACNC: 3.23 UIU/ML (ref 0.3–5)
WBC OTHER # BLD: 9 K/UL (ref 3.5–11.3)

## 2024-08-06 PROCEDURE — P9603 ONE-WAY ALLOW PRORATED MILES: HCPCS

## 2024-08-06 PROCEDURE — 84443 ASSAY THYROID STIM HORMONE: CPT

## 2024-08-06 PROCEDURE — 83036 HEMOGLOBIN GLYCOSYLATED A1C: CPT

## 2024-08-06 PROCEDURE — 85027 COMPLETE CBC AUTOMATED: CPT

## 2024-08-06 PROCEDURE — 36415 COLL VENOUS BLD VENIPUNCTURE: CPT

## 2024-08-06 PROCEDURE — 80053 COMPREHEN METABOLIC PANEL: CPT

## 2024-08-09 ENCOUNTER — HOSPITAL ENCOUNTER (OUTPATIENT)
Age: 79
Setting detail: SPECIMEN
Discharge: HOME | End: 2024-08-09

## 2024-08-09 LAB
AMORPH SED URNS QL MICRO: NORMAL
BACTERIA URNS QL MICRO: NORMAL
BILIRUB UR QL STRIP: ABNORMAL
CASTS #/AREA URNS LPF: NORMAL /LPF (ref 0–8)
CLARITY UR: ABNORMAL
COLOR UR: ABNORMAL
EPI CELLS #/AREA URNS HPF: NORMAL /HPF (ref 0–5)
GLUCOSE UR STRIP-MCNC: NEGATIVE MG/DL
HGB UR QL STRIP.AUTO: ABNORMAL
KETONES UR STRIP-MCNC: ABNORMAL MG/DL
LEUKOCYTE ESTERASE UR QL STRIP: ABNORMAL
NITRITE UR QL STRIP: NEGATIVE
PROT UR STRIP-MCNC: ABNORMAL MG/DL
RBC #/AREA URNS HPF: NORMAL /HPF (ref 0–4)
SP GR UR STRIP: 1.01 (ref 1–1.03)
UROBILINOGEN UR STRIP-ACNC: NORMAL EU/DL (ref 0–1)
WBC #/AREA URNS HPF: NORMAL /HPF (ref 0–5)

## 2024-08-09 PROCEDURE — 81001 URINALYSIS AUTO W/SCOPE: CPT

## 2024-08-09 PROCEDURE — 87086 URINE CULTURE/COLONY COUNT: CPT

## 2024-08-10 LAB
MICROORGANISM SPEC CULT: NORMAL
SERVICE CMNT-IMP: NORMAL
SPECIMEN DESCRIPTION: NORMAL

## 2024-08-12 ENCOUNTER — HOSPITAL ENCOUNTER (OUTPATIENT)
Age: 79
Setting detail: SPECIMEN
Discharge: HOME | End: 2024-08-12

## 2024-08-12 LAB
ANION GAP SERPL CALCULATED.3IONS-SCNC: 12 MMOL/L (ref 9–17)
BNP SERPL-MCNC: 2178 PG/ML
BUN SERPL-MCNC: 16 MG/DL (ref 8–23)
BUN/CREAT SERPL: 15 (ref 9–20)
CALCIUM SERPL-MCNC: 8.6 MG/DL (ref 8.6–10.4)
CHLORIDE SERPL-SCNC: 103 MMOL/L (ref 98–107)
CO2 SERPL-SCNC: 22 MMOL/L (ref 20–31)
CREAT SERPL-MCNC: 1.1 MG/DL (ref 0.5–0.9)
GFR, ESTIMATED: 52 ML/MIN/1.73M2
GLUCOSE SERPL-MCNC: 76 MG/DL (ref 70–99)
POTASSIUM SERPL-SCNC: 4.5 MMOL/L (ref 3.7–5.3)
SODIUM SERPL-SCNC: 137 MMOL/L (ref 135–144)

## 2024-08-12 PROCEDURE — 83880 ASSAY OF NATRIURETIC PEPTIDE: CPT

## 2024-08-12 PROCEDURE — 36415 COLL VENOUS BLD VENIPUNCTURE: CPT

## 2024-08-12 PROCEDURE — P9603 ONE-WAY ALLOW PRORATED MILES: HCPCS

## 2024-08-12 PROCEDURE — 80048 BASIC METABOLIC PNL TOTAL CA: CPT

## 2024-08-13 ENCOUNTER — HOSPITAL ENCOUNTER (OUTPATIENT)
Age: 79
Setting detail: SPECIMEN
Discharge: HOME | End: 2024-08-13

## 2024-08-13 LAB
ANION GAP SERPL CALCULATED.3IONS-SCNC: 13 MMOL/L (ref 9–17)
BUN SERPL-MCNC: 17 MG/DL (ref 8–23)
BUN/CREAT SERPL: 13 (ref 9–20)
CALCIUM SERPL-MCNC: 8.5 MG/DL (ref 8.6–10.4)
CHLORIDE SERPL-SCNC: 104 MMOL/L (ref 98–107)
CO2 SERPL-SCNC: 19 MMOL/L (ref 20–31)
CREAT SERPL-MCNC: 1.3 MG/DL (ref 0.5–0.9)
DIGOXIN SERPL-MCNC: 0.8 NG/ML (ref 0.5–2)
GFR, ESTIMATED: 43 ML/MIN/1.73M2
GLUCOSE SERPL-MCNC: 80 MG/DL (ref 70–99)
POTASSIUM SERPL-SCNC: 4.9 MMOL/L (ref 3.7–5.3)
SODIUM SERPL-SCNC: 136 MMOL/L (ref 135–144)

## 2024-08-13 PROCEDURE — 80048 BASIC METABOLIC PNL TOTAL CA: CPT

## 2024-08-13 PROCEDURE — 36415 COLL VENOUS BLD VENIPUNCTURE: CPT

## 2024-08-13 PROCEDURE — 80162 ASSAY OF DIGOXIN TOTAL: CPT

## 2024-08-13 PROCEDURE — P9603 ONE-WAY ALLOW PRORATED MILES: HCPCS

## 2024-08-19 ENCOUNTER — HOSPITAL ENCOUNTER (OUTPATIENT)
Age: 79
Setting detail: SPECIMEN
Discharge: HOME | End: 2024-08-19
Payer: MEDICARE

## 2024-08-19 LAB
ANION GAP SERPL CALCULATED.3IONS-SCNC: 14 MMOL/L (ref 9–17)
BUN SERPL-MCNC: 47 MG/DL (ref 8–23)
BUN/CREAT SERPL: 16 (ref 9–20)
CALCIUM SERPL-MCNC: 8.8 MG/DL (ref 8.6–10.4)
CHLORIDE SERPL-SCNC: 107 MMOL/L (ref 98–107)
CO2 SERPL-SCNC: 19 MMOL/L (ref 20–31)
CREAT SERPL-MCNC: 3 MG/DL (ref 0.5–0.9)
GFR, ESTIMATED: 16 ML/MIN/1.73M2
GLUCOSE SERPL-MCNC: 79 MG/DL (ref 70–99)
POTASSIUM SERPL-SCNC: 6 MMOL/L (ref 3.7–5.3)
SODIUM SERPL-SCNC: 140 MMOL/L (ref 135–144)

## 2024-08-19 PROCEDURE — P9603 ONE-WAY ALLOW PRORATED MILES: HCPCS

## 2024-08-19 PROCEDURE — 36415 COLL VENOUS BLD VENIPUNCTURE: CPT

## 2024-08-19 PROCEDURE — 80048 BASIC METABOLIC PNL TOTAL CA: CPT

## (undated) DEVICE — Z INACTIVE USE 2525529 CONNECTOR TBNG FOR O2

## (undated) DEVICE — SYRINGE MED 50ML LUERSLIP TIP

## (undated) DEVICE — CANNULA NSL AD TBNG L7FT PVC STR NONFLARED PRNG O2 DEL W STD

## (undated) DEVICE — GLOVE ORANGE PI 7 1/2   MSG9075

## (undated) DEVICE — Z DUPLICATE USE 2527422 TUBING O2 STD 7 FT EXTN NO CRUSH VISUAL CNTRST LF

## (undated) DEVICE — DEFENDO AIR WATER SUCTION AND BIOPSY VALVE KIT FOR  OLYMPUS: Brand: DEFENDO AIR/WATER/SUCTION AND BIOPSY VALVE

## (undated) DEVICE — GOWN,POLY REINFORCED,LG: Brand: MEDLINE

## (undated) DEVICE — JELLY,LUBE,STERILE,FLIP TOP,TUBE,2-OZ: Brand: MEDLINE

## (undated) DEVICE — Z DISCONTINUED USE 2424143 ADAPTER O2 SWVL CHRISTMAS TREE GRN

## (undated) DEVICE — FORCEPS BX L240CM WRK CHN 2.8MM STD CAP W/ NDL MIC MESH

## (undated) DEVICE — SNARE ENDOSCP L240CM LOOP W13MM DIA2.4MM SHT THROW SM OVL